# Patient Record
Sex: FEMALE | Race: WHITE | NOT HISPANIC OR LATINO | URBAN - METROPOLITAN AREA
[De-identification: names, ages, dates, MRNs, and addresses within clinical notes are randomized per-mention and may not be internally consistent; named-entity substitution may affect disease eponyms.]

---

## 2021-11-12 ENCOUNTER — INPATIENT (INPATIENT)
Facility: HOSPITAL | Age: 51
LOS: 3 days | Discharge: HOME | DRG: 41 | End: 2021-11-16
Attending: PSYCHIATRY & NEUROLOGY | Admitting: PSYCHIATRY & NEUROLOGY
Payer: COMMERCIAL

## 2021-11-12 VITALS
OXYGEN SATURATION: 95 % | WEIGHT: 175.05 LBS | SYSTOLIC BLOOD PRESSURE: 156 MMHG | DIASTOLIC BLOOD PRESSURE: 99 MMHG | TEMPERATURE: 97 F | HEART RATE: 75 BPM | RESPIRATION RATE: 16 BRPM

## 2021-11-12 LAB
ALBUMIN SERPL ELPH-MCNC: 3.7 G/DL — SIGNIFICANT CHANGE UP (ref 3.4–5)
ALP SERPL-CCNC: 44 U/L — SIGNIFICANT CHANGE UP (ref 40–120)
ALT FLD-CCNC: 13 U/L — SIGNIFICANT CHANGE UP (ref 12–42)
AMPHET UR-MCNC: NEGATIVE — SIGNIFICANT CHANGE UP
ANION GAP SERPL CALC-SCNC: 10 MMOL/L — SIGNIFICANT CHANGE UP (ref 9–16)
APTT BLD: 28.5 SEC — SIGNIFICANT CHANGE UP (ref 27.5–35.5)
AST SERPL-CCNC: 14 U/L — LOW (ref 15–37)
BARBITURATES UR SCN-MCNC: NEGATIVE — SIGNIFICANT CHANGE UP
BASOPHILS # BLD AUTO: 0.02 K/UL — SIGNIFICANT CHANGE UP (ref 0–0.2)
BASOPHILS NFR BLD AUTO: 0.2 % — SIGNIFICANT CHANGE UP (ref 0–2)
BENZODIAZ UR-MCNC: NEGATIVE — SIGNIFICANT CHANGE UP
BILIRUB SERPL-MCNC: 0.3 MG/DL — SIGNIFICANT CHANGE UP (ref 0.2–1.2)
BUN SERPL-MCNC: 16 MG/DL — SIGNIFICANT CHANGE UP (ref 7–23)
CALCIUM SERPL-MCNC: 8.5 MG/DL — SIGNIFICANT CHANGE UP (ref 8.5–10.5)
CHLORIDE SERPL-SCNC: 107 MMOL/L — SIGNIFICANT CHANGE UP (ref 96–108)
CO2 SERPL-SCNC: 23 MMOL/L — SIGNIFICANT CHANGE UP (ref 22–31)
COCAINE METAB.OTHER UR-MCNC: NEGATIVE — SIGNIFICANT CHANGE UP
CREAT SERPL-MCNC: 0.96 MG/DL — SIGNIFICANT CHANGE UP (ref 0.5–1.3)
EOSINOPHIL # BLD AUTO: 0 K/UL — SIGNIFICANT CHANGE UP (ref 0–0.5)
EOSINOPHIL NFR BLD AUTO: 0 % — SIGNIFICANT CHANGE UP (ref 0–6)
ETHANOL SERPL-MCNC: <3 MG/DL — SIGNIFICANT CHANGE UP
GLUCOSE SERPL-MCNC: 128 MG/DL — HIGH (ref 70–99)
HCG SERPL-ACNC: 1 MIU/ML — SIGNIFICANT CHANGE UP
HCT VFR BLD CALC: 40.5 % — SIGNIFICANT CHANGE UP (ref 34.5–45)
HGB BLD-MCNC: 13.2 G/DL — SIGNIFICANT CHANGE UP (ref 11.5–15.5)
IMM GRANULOCYTES NFR BLD AUTO: 0.3 % — SIGNIFICANT CHANGE UP (ref 0–1.5)
INR BLD: 1.03 — SIGNIFICANT CHANGE UP (ref 0.88–1.16)
LYMPHOCYTES # BLD AUTO: 1.43 K/UL — SIGNIFICANT CHANGE UP (ref 1–3.3)
LYMPHOCYTES # BLD AUTO: 13.1 % — SIGNIFICANT CHANGE UP (ref 13–44)
MCHC RBC-ENTMCNC: 29.1 PG — SIGNIFICANT CHANGE UP (ref 27–34)
MCHC RBC-ENTMCNC: 32.6 GM/DL — SIGNIFICANT CHANGE UP (ref 32–36)
MCV RBC AUTO: 89.2 FL — SIGNIFICANT CHANGE UP (ref 80–100)
METHADONE UR-MCNC: NEGATIVE — SIGNIFICANT CHANGE UP
MONOCYTES # BLD AUTO: 0.59 K/UL — SIGNIFICANT CHANGE UP (ref 0–0.9)
MONOCYTES NFR BLD AUTO: 5.4 % — SIGNIFICANT CHANGE UP (ref 2–14)
NEUTROPHILS # BLD AUTO: 8.87 K/UL — HIGH (ref 1.8–7.4)
NEUTROPHILS NFR BLD AUTO: 81 % — HIGH (ref 43–77)
NRBC # BLD: 0 /100 WBCS — SIGNIFICANT CHANGE UP (ref 0–0)
OPIATES UR-MCNC: NEGATIVE — SIGNIFICANT CHANGE UP
PCP SPEC-MCNC: SIGNIFICANT CHANGE UP
PCP UR-MCNC: NEGATIVE — SIGNIFICANT CHANGE UP
PLATELET # BLD AUTO: 296 K/UL — SIGNIFICANT CHANGE UP (ref 150–400)
POTASSIUM SERPL-MCNC: 3.4 MMOL/L — LOW (ref 3.5–5.3)
POTASSIUM SERPL-SCNC: 3.4 MMOL/L — LOW (ref 3.5–5.3)
PROT SERPL-MCNC: 7 G/DL — SIGNIFICANT CHANGE UP (ref 6.4–8.2)
PROTHROM AB SERPL-ACNC: 12.3 SEC — SIGNIFICANT CHANGE UP (ref 10.6–13.6)
RBC # BLD: 4.54 M/UL — SIGNIFICANT CHANGE UP (ref 3.8–5.2)
RBC # FLD: 12.8 % — SIGNIFICANT CHANGE UP (ref 10.3–14.5)
SARS-COV-2 RNA SPEC QL NAA+PROBE: SIGNIFICANT CHANGE UP
SODIUM SERPL-SCNC: 140 MMOL/L — SIGNIFICANT CHANGE UP (ref 132–145)
THC UR QL: POSITIVE
TROPONIN I, HIGH SENSITIVITY RESULT: 4.4 NG/L — SIGNIFICANT CHANGE UP
TROPONIN I, HIGH SENSITIVITY RESULT: 5.1 NG/L — SIGNIFICANT CHANGE UP
WBC # BLD: 10.94 K/UL — HIGH (ref 3.8–10.5)
WBC # FLD AUTO: 10.94 K/UL — HIGH (ref 3.8–10.5)

## 2021-11-12 PROCEDURE — 70496 CT ANGIOGRAPHY HEAD: CPT | Mod: 26

## 2021-11-12 PROCEDURE — 93010 ELECTROCARDIOGRAM REPORT: CPT

## 2021-11-12 PROCEDURE — 70498 CT ANGIOGRAPHY NECK: CPT | Mod: 26

## 2021-11-12 PROCEDURE — 71045 X-RAY EXAM CHEST 1 VIEW: CPT | Mod: 26

## 2021-11-12 PROCEDURE — 0042T: CPT | Mod: 26

## 2021-11-12 PROCEDURE — 99285 EMERGENCY DEPT VISIT HI MDM: CPT | Mod: 25

## 2021-11-12 RX ORDER — CLOPIDOGREL BISULFATE 75 MG/1
75 TABLET, FILM COATED ORAL ONCE
Refills: 0 | Status: DISCONTINUED | OUTPATIENT
Start: 2021-11-12 | End: 2021-11-13

## 2021-11-12 RX ORDER — MECLIZINE HCL 12.5 MG
25 TABLET ORAL ONCE
Refills: 0 | Status: COMPLETED | OUTPATIENT
Start: 2021-11-12 | End: 2021-11-12

## 2021-11-12 RX ORDER — ACETAMINOPHEN 500 MG
650 TABLET ORAL EVERY 6 HOURS
Refills: 0 | Status: DISCONTINUED | OUTPATIENT
Start: 2021-11-12 | End: 2021-11-16

## 2021-11-12 RX ORDER — ENOXAPARIN SODIUM 100 MG/ML
40 INJECTION SUBCUTANEOUS DAILY
Refills: 0 | Status: DISCONTINUED | OUTPATIENT
Start: 2021-11-12 | End: 2021-11-16

## 2021-11-12 RX ORDER — ASPIRIN/CALCIUM CARB/MAGNESIUM 324 MG
325 TABLET ORAL ONCE
Refills: 0 | Status: COMPLETED | OUTPATIENT
Start: 2021-11-12 | End: 2021-11-12

## 2021-11-12 RX ORDER — ASPIRIN/CALCIUM CARB/MAGNESIUM 324 MG
81 TABLET ORAL DAILY
Refills: 0 | Status: DISCONTINUED | OUTPATIENT
Start: 2021-11-13 | End: 2021-11-16

## 2021-11-12 RX ORDER — ONDANSETRON 8 MG/1
4 TABLET, FILM COATED ORAL EVERY 6 HOURS
Refills: 0 | Status: DISCONTINUED | OUTPATIENT
Start: 2021-11-12 | End: 2021-11-16

## 2021-11-12 RX ORDER — LANOLIN ALCOHOL/MO/W.PET/CERES
5 CREAM (GRAM) TOPICAL AT BEDTIME
Refills: 0 | Status: DISCONTINUED | OUTPATIENT
Start: 2021-11-12 | End: 2021-11-16

## 2021-11-12 RX ORDER — ONDANSETRON 8 MG/1
4 TABLET, FILM COATED ORAL ONCE
Refills: 0 | Status: COMPLETED | OUTPATIENT
Start: 2021-11-12 | End: 2021-11-12

## 2021-11-12 RX ORDER — ATORVASTATIN CALCIUM 80 MG/1
80 TABLET, FILM COATED ORAL AT BEDTIME
Refills: 0 | Status: DISCONTINUED | OUTPATIENT
Start: 2021-11-12 | End: 2021-11-12

## 2021-11-12 RX ADMIN — Medication 25 MILLIGRAM(S): at 18:30

## 2021-11-12 RX ADMIN — Medication 650 MILLIGRAM(S): at 22:42

## 2021-11-12 RX ADMIN — ONDANSETRON 4 MILLIGRAM(S): 8 TABLET, FILM COATED ORAL at 17:56

## 2021-11-12 RX ADMIN — Medication 5 MILLIGRAM(S): at 22:43

## 2021-11-12 RX ADMIN — ONDANSETRON 4 MILLIGRAM(S): 8 TABLET, FILM COATED ORAL at 22:43

## 2021-11-12 RX ADMIN — Medication 650 MILLIGRAM(S): at 23:35

## 2021-11-12 RX ADMIN — Medication 325 MILLIGRAM(S): at 18:45

## 2021-11-12 NOTE — ED PROVIDER NOTE - PHYSICAL EXAMINATION
Constitutional:  Well appearing, awake, alert, oriented to person, place, time/situation and in no apparent distress  Head:  NC/AT, symmetric, neck supple  ENMT: Airway patent, nasal mucosa clear, mouth with normal mucosa, throat has no vesicles, no oropharyngeal exudates and vulva is midline  Eyes:  Clear bilaterally, pupils equal, round and reactive to light  Cardiac:  Normal rate, regular rhythm. Heart sounds S1,S2.  No murmurs, rubs or gallops.  No JVD.  Respiratory:  Breath sounds clear and equal bilaterally  GI:   Abd soft, non-tender, no guarding  :  No discharge or lesions  MSK:  Spine appears normal, range of motion is not limited, no muscle or joint tenderness  Neuro:  see below  Skin:  Skin normal color for race, warm, dry and intact.  No evidence of rash  Psych:  Normal mood and affect, no apparent risk to self or others.  Heme:  No adenopathy or splenomegaly.

## 2021-11-12 NOTE — ED ADULT NURSE NOTE - OBJECTIVE STATEMENT
50 y/o female states while sitting at her computer working at 10:45am- she heard a pop and acutely had dizziness, slurred speech, ataxia, nausea and vomiting. Upon arrival Stroke code was initiated and sent to CT from Ambulance stretcher. IV placed labs drawn and sent. provider made aware

## 2021-11-12 NOTE — ED ADULT TRIAGE NOTE - CHIEF COMPLAINT QUOTE
Patient reports dizziness , difficulty speaking and unsteady gait , nausea and vomiting since 10:30am , pt states that she got up from her home computer when she felt a pop in her brain and the symptoms started right after. STROKE CODE initiated

## 2021-11-12 NOTE — ED PROVIDER NOTE - CLINICAL SUMMARY MEDICAL DECISION MAKING FREE TEXT BOX
52 y/o F presents to ED c/o dizziness x 6.5 hours.  Pt identified as stroke protocol upon arrival to ED.  Pt slightly HTN upon arrival 150's/90's systolic.  Labs wnl.  CT head, CTA head and neck and CT perfusion, and CXR unremarkable.  Pt discussed with DR. Elin Alarcon, neurology and [facility].  Pt amenable to admission.

## 2021-11-12 NOTE — H&P ADULT - NSHPREVIEWOFSYSTEMS_GEN_ALL_CORE
Constitutional: No fever, weight loss or fatigue  Eyes: No eye pain, visual disturbances, or discharge  ENMT:  No difficulty hearing, tinnitus, vertigo; No sinus or throat pain  Neck: No pain or stiffness  Respiratory: No cough, wheezing, chills or hemoptysis  Cardiovascular: No chest pain, palpitations, shortness of breath, dizziness or leg swelling  Gastrointestinal: No abdominal pain. No nausea, vomiting or hematemesis; No diarrhea or constipation. Nohematochezia.  Genitourinary: No dysuria, frequency, hematuria or incontinence  Neurological: As per HPI

## 2021-11-12 NOTE — H&P ADULT - NSHPLABSRESULTS_GEN_ALL_CORE
Fingerstick Blood Glucose: CAPILLARY BLOOD GLUCOSE  108 (12 Nov 2021 18:36)      POCT Blood Glucose.: 108 mg/dL (12 Nov 2021 17:12)    LABS:                        13.2   10.94 )-----------( 296      ( 12 Nov 2021 17:28 )             40.5     11-12    140  |  107  |  16  ----------------------------<  128<H>  3.4<L>   |  23  |  0.96    Ca    8.5      12 Nov 2021 17:28    TPro  7.0  /  Alb  3.7  /  TBili  0.3  /  DBili  x   /  AST  14<L>  /  ALT  13  /  AlkPhos  44  11-12    PT/INR - ( 12 Nov 2021 17:28 )   PT: 12.3 sec;   INR: 1.03          PTT - ( 12 Nov 2021 17:28 )  PTT:28.5 sec      RADIOLOGY & ADDITIONAL STUDIES:  < from: CT Brain Stroke Protocol (11.12.21 @ 17:49) >  IMPRESSION:  No acute intracranial findings.      < from: CT Perfusion w/ IV Cont (11.12.21 @ 17:50) >  CT PERFUSION: Suggestion of prolonged Tmax in the right temporal lobe and adjacent right cerebellar hemisphere, favored to be artifactual in nature. (Tmax >6.0s volume: 16 mL)      < from: CT Angio Neck w/ IV Cont (11.12.21 @ 17:50) >  CT ANGIOGRAPHY NECK: No vessel narrowing or occlusion in the neck.      < from: CT Angio Head w/ IV Cont (11.12.21 @ 17:50) >  CT ANGIOGRAPHY HEAD: No evidence of vascular stenosis, occlusion, aneurysm or vascular malformation.    < from: Xray Chest 1 View AP/PA (11.12.21 @ 17:39) >  Impression: No acute abnormality visualized.

## 2021-11-12 NOTE — ED ADULT NURSE REASSESSMENT NOTE - NSIMPLEMENTINTERV_GEN_ALL_ED
Implemented All Fall Risk Interventions:  [place] to call system. Call bell, personal items and telephone within reach. Instruct patient to call for assistance. Room bathroom lighting operational. Non-slip footwear when patient is off stretcher. Physically safe environment: no spills, clutter or unnecessary equipment. Stretcher in lowest position, wheels locked, appropriate side rails in place. Provide visual cue, wrist band, yellow gown, etc. Monitor gait and stability. Monitor for mental status changes and reorient to person, place, and time. Review medications for side effects contributing to fall risk. Reinforce activity limits and safety measures with patient and family.

## 2021-11-12 NOTE — ED ADULT NURSE REASSESSMENT NOTE - NS ED NURSE REASSESS COMMENT FT1
received pt from JOE Rader, pt on telemetry, VSS. denies HA and overall feels that her sx have improved somewhat. c/o dizziness and mild nausea. +fall risk precautions in place. will continue to monitor

## 2021-11-12 NOTE — H&P ADULT - ASSESSMENT
51y Female with PMHx of vertigo initially presented to [facility] for dizziness, right hand dysmetria and right leg weakness, LKW 11/12 10:30AM. At [facility], CTH without hemorrhage. CTA with no steno-occlusive disease. CTP with prolonged Tmax in the right temporal lobe and right cerebellar lobe thought to be artifact as it does not correspond to her symptoms. Patient out of tpa window. She was transferred to [facility] for stroke work up.     Neuro  #CVA r/o  - s/p ASA 325mg load at [facility]  - start aspirin 81mg tomorrow morning, will hold plavix   - hold atorvastatin 80mg qhs pending LDL results  - q4hr stroke neuro checks and vitals  - obtain MRI Brain without contrast  - HCT Results: negative  - CTA Results: no steno-occlusive disease  - Stroke education    Cards  - permissive hypertension, Goal -180  - obtain TTE with bubble  - Stroke Code EKG Results: NSR  - LDL results: pending am labs    Pulm  - call provider if SPO2 < 94%    GI  #Nutrition/Fluids/Electrolytes   - replete K<4 and Mg <2  - Diet: Regular  - IVF: none    Infectious Disease  - Stroke Code CXR results: No acute abnormality visualized.    Endocrine  - A1C results: pending am labs  - TSH results: pending am labs    DVT Prophylaxis  - lovenox sq for DVT prophylaxis   - SCDs for DVT prophylaxis       Discussed with Neurology Attending Dr. Alarcon 51y Female with PMHx of vertigo initially presented to [facility] for dizziness, right hand dysmetria and right leg weakness, LKW 11/12 10:30AM. At [facility], CTH without hemorrhage. CTA with no steno-occlusive disease. CTP with prolonged Tmax in the right temporal lobe and right cerebellar lobe thought to be artifact per report. Patient out of tpa window. She was transferred to [facility] for stroke work up.     Neuro  #CVA r/o  In discussion with Dr. Zamudio, concern for cerebellar ischemia despite CTA report as right vertebral artery appears smaller and terminates at the right PICA. As ischemia would be below the decussation of pyramids, would expect ipsilateral symptoms  - s/p ASA 325mg load at [facility]  - c/w with asa 81mg daily tomorrow, will give one dose of plavix 75mg now and will continue pending MRA results  - hold atorvastatin 80mg qhs pending LDL results  - q4hr stroke neuro checks and vitals  - obtain MRI Brain without contrast  - obtain MRA neck r/o dissection   - consider hypercoagable w/u as pt with no pmhx risk factors  - f/u ESR, CRP  - HCT Results: negative  - CTA Results: no steno-occlusive disease  - Stroke education    Cards  - permissive hypertension, Goal -180 for cerebellum perfusion   - obtain TTE with bubble  - Stroke Code EKG Results: NSR  - LDL results: pending am labs    Pulm  - call provider if SPO2 < 94%    GI  #Nutrition/Fluids/Electrolytes   - replete K<4 and Mg <2  - Diet: Regular  - IVF: none    Infectious Disease  - Stroke Code CXR results: No acute abnormality visualized.    Endocrine  - A1C results: pending am labs  - TSH results: pending am labs    DVT Prophylaxis  - lovenox sq for DVT prophylaxis   - SCDs for DVT prophylaxis       Discussed with Neurology Attending Dr. Zamudio 51y Female with PMHx of vertigo initially presented to [facility] for dizziness, right hand dysmetria and right leg weakness, LKW 11/12 10:30AM. At [facility], CTH without hemorrhage. CTA with no steno-occlusive disease. CTP with prolonged Tmax in the right temporal lobe and right cerebellar lobe thought to be artifact per report. Patient out of tpa window. She was transferred to [facility] for stroke work up.     Neuro  #CVA r/o  In discussion with Dr. Zamudio, concern for cerebellar ischemia despite CTA report as right vertebral artery appears smaller and terminates at the right PICA. As ischemia would be below the decussation of pyramids, would expect ipsilateral symptoms (spinocerebellar tracts do not decussate)  - s/p ASA 325mg load at [facility]  - c/w with asa 81mg daily tomorrow, will give one dose of plavix 75mg now and will continue pending MRA results  - hold atorvastatin 80mg qhs pending LDL results  - q4hr stroke neuro checks and vitals  - obtain MRI Brain without contrast  - obtain MRA neck r/o dissection   - consider hypercoagable w/u as pt with no pmhx risk factors  - f/u ESR, CRP  - HCT Results: negative  - CTA Results: no steno-occlusive disease  - Stroke education    Cards  - permissive hypertension, Goal -180 for cerebellum perfusion   - obtain TTE with bubble  - Stroke Code EKG Results: NSR  - LDL results: pending am labs    Pulm  - call provider if SPO2 < 94%    GI  #Nutrition/Fluids/Electrolytes   - replete K<4 and Mg <2  - Diet: Regular  - IVF: none    Infectious Disease  - Stroke Code CXR results: No acute abnormality visualized.    Endocrine  - A1C results: pending am labs  - TSH results: pending am labs    DVT Prophylaxis  - lovenox sq for DVT prophylaxis   - SCDs for DVT prophylaxis       Discussed with Neurology Attending Dr. Zamudio

## 2021-11-12 NOTE — H&P ADULT - HISTORY OF PRESENT ILLNESS
**STROKE HPI***    HPI: 51y Female with PMHx of vertigo initially presented to [facility] for dizziness, right hand dysmetria and right leg weakness. Patient woke up 11/12 10:30AM in [facility]. 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 She tried to continue with her call but noticed that she had some word finding difficulties, slurred speech and difficulty navigating the computer with her right hand (right hand dominant). Her dizziness is much more severe compared to her prior vertigo episodes. Her dizziness does improve with her eyes closed and turning onto her right side. At [facility], CTH without hemorrhage. CTA with no steno-occlusive disease. CTP with prolonged Tmax in the right temporal lob and right cerebellar lobe thought to be artifact as it does not correspond to her symptoms. Patient was loaded with ASA 325mg and transferred to [facility] for stroke work up.     At bedside, patient's symptoms have not improved. Her dizziness is described as the room spinning. Her right hand dysmetria is still present and when attempting to walk at [facility], she had to hold onto the wall to support herself as she felt that her right leg was dragging. She denies CP, SOB, changes in vision, decrease sensation/numbness along her extremities. She last saw her PCP w1 year ago. Her mother has 2 miscarriages and provoked b/l LE DVTs (secondary to b/l leg fractures). Patient has never been pregnant and denies any family hx of blood disorders.         **STROKE HPI***    HPI: 51y Female with PMHx of vertigo initially presented to [facility] for dizziness, right hand dysmetria and right leg weakness. Patient woke up 11/12 10:30AM in [facility]. 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 She tried to continue with her call but noticed that she had some word finding difficulties, slurred speech and difficulty navigating the computer with her right hand (right hand dominant). Her dizziness is much more severe compared to her prior vertigo episodes. Her dizziness does improve with her eyes closed and turning onto her right side. At [facility], CTH without hemorrhage. CTA with no steno-occlusive disease. CTP with prolonged Tmax in the right temporal lob and right cerebellar lobe thought to be artifact. Patient was loaded with ASA 325mg and transferred to [facility] for stroke work up.     At bedside, patient's symptoms have not improved. Her dizziness is described as the room spinning. Her right hand dysmetria is still present and when attempting to walk at [facility], she had to hold onto the wall to support herself as she felt that her right leg was dragging. She denies CP, SOB, changes in vision, decrease sensation/numbness along her extremities. She last saw her PCP 1 year ago. Her mother has 2 miscarriages and provoked b/l LE DVTs (secondary to b/l leg fractures). Patient has never been pregnant and denies any family hx of blood disorders.

## 2021-11-12 NOTE — H&P ADULT - NSHPPHYSICALEXAM_GEN_ALL_CORE
Vital Signs Last 24 Hrs  T(C): 36.7 (12 Nov 2021 21:01), Max: 36.7 (12 Nov 2021 21:01)  T(F): 98 (12 Nov 2021 21:01), Max: 98 (12 Nov 2021 21:01)  HR: 52 (12 Nov 2021 21:01) (52 - 75)  BP: 162/94 (12 Nov 2021 21:01) (156/99 - 172/84)  BP(mean): --  RR: 16 (12 Nov 2021 21:01) (14 - 16)  SpO2: 98% (12 Nov 2021 21:01) (95% - 100%)    Physical exam:  General: No acute distress, awake and alert  Cardiovascular: Regular rate and rhythm, no murmurs, rubs, or gallops. No bruits  Pulmonary: Anterior breath sounds clear bilaterally, no crackles or wheezing. No use of accessory muscles  GI: Abdomen soft, non-tender, non-distended    Neurologic:  -Mental status: Awake, alert, oriented to person, place, and time. Speech is fluent, able to name pen, thumb, glasses, mild dysarthria, easily comprehensible. Recent and remote memory intact. Follows commands. Attention/concentration intact. Fund of knowledge appropriate.  -Cranial nerves:   II: Visual fields are full to confrontation.  III, IV, VI: Extraocular movements are intact without nystagmus. Pupils equally round and reactive to light  V:  Facial sensation V1-V3 equal and intact   VII: Face is symmetric with normal eye closure and smile  VIII: Hearing is grossly intact bilaterally  XII: Tongue protrudes midline  Motor: Normal bulk and tone. No pronator drift. Strength bilateral upper extremity 5/5, bilateral lower extremities 5/5. Rapid alternating movements intact and symmetric  Sensation: Intact to light touch bilaterally. No neglect or extinction on double simultaneous testing.  Coordination: Dysmetria on finger to nose on right UE. No dysmetria heel-to-shin bilaterally  Gait: Deferred     NIHSS: 2 ASPECT Score: 10

## 2021-11-12 NOTE — ED PROVIDER NOTE - OBJECTIVE STATEMENT
52 y/o F with PMH dizziness c/o 52 y/o F with PMH vertigo presents to ED via EMS c/o dizziness that started at 10:30 am this morning, approx 6.5 hrs pta.  Pt states woke up at 10 am and symptoms started 30 minutes later.  She describes feeling pop like sensation to her neck followed by significant dizziness.  She became nauseous with vomiting and was not able to walk.  She endorses a mild headache.  Pt denies trauma/falls, fevers/chills, neck or back pain, visual changes, sore throat, chest pain, palpitations, cough, SOB, abd pain, n/v/d, dysuria, hematuria, weakness, numbness, lower extremity swelling, rash, sick contacts, recent hospitalizations, recent travels.

## 2021-11-13 LAB
A1C WITH ESTIMATED AVERAGE GLUCOSE RESULT: 5.3 % — SIGNIFICANT CHANGE UP (ref 4–5.6)
ANION GAP SERPL CALC-SCNC: 7 MMOL/L — SIGNIFICANT CHANGE UP (ref 5–17)
BUN SERPL-MCNC: 9 MG/DL — SIGNIFICANT CHANGE UP (ref 7–23)
CALCIUM SERPL-MCNC: 9.4 MG/DL — SIGNIFICANT CHANGE UP (ref 8.4–10.5)
CHLORIDE SERPL-SCNC: 105 MMOL/L — SIGNIFICANT CHANGE UP (ref 96–108)
CHOLEST SERPL-MCNC: 217 MG/DL — HIGH
CO2 SERPL-SCNC: 28 MMOL/L — SIGNIFICANT CHANGE UP (ref 22–31)
COVID-19 NUCLEOCAPSID GAM AB INTERP: NEGATIVE — SIGNIFICANT CHANGE UP
COVID-19 NUCLEOCAPSID TOTAL GAM ANTIBODY RESULT: 0.08 INDEX — SIGNIFICANT CHANGE UP
COVID-19 SPIKE DOMAIN AB INTERP: POSITIVE
COVID-19 SPIKE DOMAIN ANTIBODY RESULT: >250 U/ML — HIGH
CREAT SERPL-MCNC: 1 MG/DL — SIGNIFICANT CHANGE UP (ref 0.5–1.3)
CRP SERPL-MCNC: <3 MG/L — SIGNIFICANT CHANGE UP (ref 0–4)
ERYTHROCYTE [SEDIMENTATION RATE] IN BLOOD: 11 MM/HR — SIGNIFICANT CHANGE UP
ESTIMATED AVERAGE GLUCOSE: 105 MG/DL — SIGNIFICANT CHANGE UP (ref 68–114)
GLUCOSE SERPL-MCNC: 110 MG/DL — HIGH (ref 70–99)
HCT VFR BLD CALC: 43.6 % — SIGNIFICANT CHANGE UP (ref 34.5–45)
HDLC SERPL-MCNC: 55 MG/DL — SIGNIFICANT CHANGE UP
HGB BLD-MCNC: 13.6 G/DL — SIGNIFICANT CHANGE UP (ref 11.5–15.5)
LIPID PNL WITH DIRECT LDL SERPL: 138 MG/DL — HIGH
MAGNESIUM SERPL-MCNC: 2.2 MG/DL — SIGNIFICANT CHANGE UP (ref 1.6–2.6)
MCHC RBC-ENTMCNC: 28.3 PG — SIGNIFICANT CHANGE UP (ref 27–34)
MCHC RBC-ENTMCNC: 31.2 GM/DL — LOW (ref 32–36)
MCV RBC AUTO: 90.6 FL — SIGNIFICANT CHANGE UP (ref 80–100)
NON HDL CHOLESTEROL: 162 MG/DL — HIGH
NRBC # BLD: 0 /100 WBCS — SIGNIFICANT CHANGE UP (ref 0–0)
PHOSPHATE SERPL-MCNC: 4 MG/DL — SIGNIFICANT CHANGE UP (ref 2.5–4.5)
PLATELET # BLD AUTO: 338 K/UL — SIGNIFICANT CHANGE UP (ref 150–400)
POTASSIUM SERPL-MCNC: 4.3 MMOL/L — SIGNIFICANT CHANGE UP (ref 3.5–5.3)
POTASSIUM SERPL-SCNC: 4.3 MMOL/L — SIGNIFICANT CHANGE UP (ref 3.5–5.3)
RBC # BLD: 4.81 M/UL — SIGNIFICANT CHANGE UP (ref 3.8–5.2)
RBC # FLD: 13 % — SIGNIFICANT CHANGE UP (ref 10.3–14.5)
SARS-COV-2 IGG+IGM SERPL QL IA: 0.08 INDEX — SIGNIFICANT CHANGE UP
SARS-COV-2 IGG+IGM SERPL QL IA: >250 U/ML — HIGH
SARS-COV-2 IGG+IGM SERPL QL IA: NEGATIVE — SIGNIFICANT CHANGE UP
SARS-COV-2 IGG+IGM SERPL QL IA: POSITIVE
SODIUM SERPL-SCNC: 140 MMOL/L — SIGNIFICANT CHANGE UP (ref 135–145)
TRIGL SERPL-MCNC: 121 MG/DL — SIGNIFICANT CHANGE UP
TSH SERPL-MCNC: 1.91 UIU/ML — SIGNIFICANT CHANGE UP (ref 0.27–4.2)
WBC # BLD: 9.6 K/UL — SIGNIFICANT CHANGE UP (ref 3.8–10.5)
WBC # FLD AUTO: 9.6 K/UL — SIGNIFICANT CHANGE UP (ref 3.8–10.5)

## 2021-11-13 PROCEDURE — 99233 SBSQ HOSP IP/OBS HIGH 50: CPT

## 2021-11-13 PROCEDURE — 70551 MRI BRAIN STEM W/O DYE: CPT | Mod: 26

## 2021-11-13 PROCEDURE — 70547 MR ANGIOGRAPHY NECK W/O DYE: CPT | Mod: 26

## 2021-11-13 PROCEDURE — 99223 1ST HOSP IP/OBS HIGH 75: CPT

## 2021-11-13 RX ORDER — ATORVASTATIN CALCIUM 80 MG/1
40 TABLET, FILM COATED ORAL AT BEDTIME
Refills: 0 | Status: DISCONTINUED | OUTPATIENT
Start: 2021-11-13 | End: 2021-11-16

## 2021-11-13 RX ORDER — SODIUM CHLORIDE 5 G/100ML
250 INJECTION, SOLUTION INTRAVENOUS
Refills: 0 | Status: COMPLETED | OUTPATIENT
Start: 2021-11-13 | End: 2021-11-13

## 2021-11-13 RX ORDER — CLOPIDOGREL BISULFATE 75 MG/1
75 TABLET, FILM COATED ORAL DAILY
Refills: 0 | Status: DISCONTINUED | OUTPATIENT
Start: 2021-11-13 | End: 2021-11-16

## 2021-11-13 RX ADMIN — ATORVASTATIN CALCIUM 40 MILLIGRAM(S): 80 TABLET, FILM COATED ORAL at 23:01

## 2021-11-13 RX ADMIN — ENOXAPARIN SODIUM 40 MILLIGRAM(S): 100 INJECTION SUBCUTANEOUS at 12:11

## 2021-11-13 RX ADMIN — Medication 650 MILLIGRAM(S): at 18:59

## 2021-11-13 RX ADMIN — SODIUM CHLORIDE 500 MILLILITER(S): 5 INJECTION, SOLUTION INTRAVENOUS at 21:46

## 2021-11-13 RX ADMIN — Medication 650 MILLIGRAM(S): at 19:44

## 2021-11-13 RX ADMIN — Medication 81 MILLIGRAM(S): at 12:11

## 2021-11-13 RX ADMIN — CLOPIDOGREL BISULFATE 75 MILLIGRAM(S): 75 TABLET, FILM COATED ORAL at 23:01

## 2021-11-13 RX ADMIN — Medication 5 MILLIGRAM(S): at 23:01

## 2021-11-13 NOTE — OCCUPATIONAL THERAPY INITIAL EVALUATION ADULT - PERTINENT HX OF CURRENT PROBLEM, REHAB EVAL
51y Female with PMHx of vertigo initially presented to [facility] for dizziness, right hand dysmetria and right leg weakness, LKW 11/12 10:30AM. At [facility], CTH without hemorrhage. CTA with no steno-occlusive disease. CTP with prolonged Tmax in the right temporal lobe and right cerebellar lobe thought to be artifact per report. Patient out of tpa window. She was transferred to [facility] for stroke work up.

## 2021-11-13 NOTE — PROGRESS NOTE ADULT - ASSESSMENT
51y Female with PMHx of vertigo initially presented to [facility] for dizziness, right hand dysmetria and right leg weakness, LKW 11/12 10:30AM. At [facility], CTH without hemorrhage. CTA with no steno-occlusive disease. CTP with prolonged Tmax in the right temporal lobe and right cerebellar lobe thought to be artifact per report. Patient out of tpa window. She was transferred to [facility] for stroke work up.     Neuro  #CVA r/o  In discussion with Dr. Zamudio, concern for cerebellar ischemia despite CTA report as right vertebral artery appears smaller and terminates at the right PICA. As ischemia would be below the decussation of pyramids, would expect ipsilateral symptoms (spinocerebellar tracts do not decussate)  - s/p ASA 325mg load at [facility]  - c/w with asa 81mg daily and plavix 75 daily  - atorvastatin 40mg   - q4hr stroke neuro checks and vitals  - obtain MRI Brain without contrast  - obtain MRA neck r/o dissection   - consider hypercoagable w/u as pt with no pmhx risk factors if there is no dissection  -  ESR 11  - HCT Results: negative  - CTA Results: no steno-occlusive disease, per admitting attending, right vertebral artery appears smaller and terminates in PICA  - Stroke education    Cards  - permissive hypertension, Goal -180 for cerebellum perfusion   - obtain TTE with bubble  - Stroke Code EKG Results: NSR  - LDL results: 138    Pulm  - call provider if SPO2 < 94%    GI  #Nutrition/Fluids/Electrolytes   - replete K<4 and Mg <2  - Diet: Regular  - IVF: none    Infectious Disease  - Stroke Code CXR results: No acute abnormality visualized.    Endocrine  - A1C results: 5.3  - TSH results: pending    DVT Prophylaxis  - lovenox sq for DVT prophylaxis   - SCDs for DVT prophylaxis       Discussed with Neurology Attending Dr. Tapia   51y Female with PMHx of vertigo initially presented to [facility] for dizziness, right hand dysmetria and right leg weakness, LKW 11/12 10:30AM. At [facility], CTH without hemorrhage. CTA with no steno-occlusive disease. CTP with prolonged Tmax in the right temporal lobe and right cerebellar lobe thought to be artifact per report. Patient out of tpa window. She was transferred to [facility] for stroke work up.     Neuro  #CVA r/o  In discussion with Dr. Zamudio, concern for cerebellar ischemia despite CTA report as right vertebral artery appears smaller and terminates at the right PICA. As ischemia would be below the decussation of pyramids, would expect ipsilateral symptoms (spinocerebellar tracts do not decussate)  - s/p ASA 325mg load at [facility]  - c/w with asa 81mg daily and plavix 75 daily  - atorvastatin 40mg   - q4hr stroke neuro checks and vitals  - obtain MRI Brain without contrast  - obtain MRA neck r/o dissection   - consider hypercoagable w/u as pt with no pmhx risk factors if there is no dissection  -  ESR 11  - HCT Results: negative  - CTA Results: no steno-occlusive disease, per admitting attending, right vertebral artery appears smaller and terminates in PICA  - Stroke education    Cards  - permissive hypertension, Goal -180 for cerebellum perfusion   - obtain TTE with bubble  - Stroke Code EKG Results: NSR  - LDL results: 138    Pulm  - call provider if SPO2 < 94%    GI  #Nutrition/Fluids/Electrolytes   - replete K<4 and Mg <2  - Diet: Regular  - IVF: none    Infectious Disease  - Stroke Code CXR results: No acute abnormality visualized.    Endocrine  - A1C results: 5.3  - TSH results: pending    DVT Prophylaxis  - lovenox sq for DVT prophylaxis   - SCDs for DVT prophylaxis     PT/OT/SLP - outpatient therapy    Discussed with Neurology Attending Dr. Tapia   51y Female with PMHx of vertigo initially presented to [facility] for dizziness, right hand dysmetria and right leg weakness, LKW 11/12 10:30AM. At [facility], CTH without hemorrhage. CTA with no steno-occlusive disease. CTP with prolonged Tmax in the right temporal lobe and right cerebellar lobe thought to be artifact per report. Patient out of tpa window. She was transferred to [facility] for stroke work up.     Neuro  #CVA r/o  In discussion with Dr. Zamudio, concern for cerebellar ischemia despite CTA report as right vertebral artery appears smaller and terminates at the right PICA. As ischemia would be below the decussation of pyramids, would expect ipsilateral symptoms (spinocerebellar tracts do not decussate)  - s/p ASA 325mg load at [facility]  - c/w with asa 81mg daily and plavix 75 daily  - atorvastatin 40mg   - q4hr stroke neuro checks and vitals  - obtain MRI Brain without contrast  - obtain MRA neck r/o dissection   - consider hypercoagable w/u as pt with no pmhx risk factors if there is no dissection  -  ESR 11  - HCT Results: negative  - CTA Results: no steno-occlusive disease, per admitting attending, right vertebral artery appears smaller and terminates in PICA  - Stroke education    Cards  - permissive hypertension, Goal -180 for cerebellum perfusion   - obtain TTE with bubble  - Stroke Code EKG Results: NSR  - LDL results: 138    Pulm  - call provider if SPO2 < 94%    GI  #Nutrition/Fluids/Electrolytes   - replete K<4 and Mg <2  - Diet: Regular  - IVF: none    Infectious Disease  - Stroke Code CXR results: No acute abnormality visualized.    Endocrine  - A1C results: 5.3  - TSH results: 1.9    DVT Prophylaxis  - lovenox sq for DVT prophylaxis   - SCDs for DVT prophylaxis     PT/OT/SLP - outpatient therapy    Discussed with Neurology Attending Dr. Tapia   51y Female with PMHx of vertigo initially presented to [facility] for dizziness, right hand dysmetria and right leg weakness, LKW 11/12 10:30AM. At [facility], CTH without hemorrhage. CTA with no steno-occlusive disease. CTP with prolonged Tmax in the right temporal lobe and right cerebellar lobe thought to be artifact per report. Patient out of tpa window. She was transferred to [facility] for stroke work up.     Neuro  #CVA r/o  In discussion with Dr. Zamudio, concern for cerebellar ischemia despite CTA report as right vertebral artery appears smaller and terminates at the right PICA. As ischemia would be below the decussation of pyramids, would expect ipsilateral symptoms (spinocerebellar tracts do not decussate)  - s/p ASA 325mg load at [facility]  - c/w with asa 81mg daily and plavix 75 daily  - atorvastatin 40mg   - q4hr stroke neuro checks and vitals  - obtain MRI Brain without contrast  - obtain MRA neck r/o dissection   - consider hypercoagable w/u as pt with no pmhx risk factors if there is no dissection  -  ESR 11  - HCT Results: negative  - CTA Results: no steno-occlusive disease, per admitting attending, right vertebral artery appears smaller and terminates in PICA  - Stroke education    Cards  - permissive hypertension, Goal -180   - obtain TTE with bubble  - Stroke Code EKG Results: NSR  - LDL results: 138    Pulm  - call provider if SPO2 < 94%    GI  #Nutrition/Fluids/Electrolytes   - replete K<4 and Mg <2  - Diet: Regular  - IVF: none    Infectious Disease  - Stroke Code CXR results: No acute abnormality visualized.    Endocrine  - A1C results: 5.3  - TSH results: 1.9    DVT Prophylaxis  - lovenox sq for DVT prophylaxis   - SCDs for DVT prophylaxis     PT/OT/SLP - outpatient therapy    Discussed with Neurology Attending Dr. Tapia

## 2021-11-13 NOTE — PHYSICAL THERAPY INITIAL EVALUATION ADULT - GENERAL OBSERVATIONS, REHAB EVAL
PT IE Completed. MRS: #4. Pt received semi-supine in bed, +heplock, +tele, A&Ox4, +RA, in NAD and agreeable to work with PT. OT Josué present throughout. Pt presents to [facility] with dizziness, RUE/RLE weakness.Pt left in as found, +bed alarm, +call jhaveri within reach, RN Kailyn notified. Pt can benefit from PT in order to improve quality of life by increasing strength/endurance/balance with functional mobility and ADLS. Will assess for assistive device in further session.

## 2021-11-13 NOTE — PHYSICAL THERAPY INITIAL EVALUATION ADULT - MANUAL MUSCLE TESTING RESULTS, REHAB EVAL
RUE shoulder flexion/elevation/elbow flexion/elbow extension/ 5/5  ; LUE shoulder flexion/elevation/elbow flexion/elbow extension/ 5/5

## 2021-11-13 NOTE — SPEECH LANGUAGE PATHOLOGY EVALUATION - SLP DIAGNOSIS
Pt presents with mild dysarthria characterized by irregular articulatory breakdowns, telescoping syllables, and impaired AMRs. 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 •	Expressive and receptive language skills were clinically judged to be WFL at the semi-complex conversation level. This was evidenced by patient’s ability to accurately respond to a variety of open-ended questions. Content was logical and pertinent. Sentences were semantically and syntactically appropriate.

## 2021-11-13 NOTE — SPEECH LANGUAGE PATHOLOGY EVALUATION - ARTICULATION
articulatory breakdowns more prominent during production of multisyllabic words, telescoping of syllables/imprecise

## 2021-11-13 NOTE — CONSULT NOTE ADULT - SUBJECTIVE AND OBJECTIVE BOX
Patient seen and evaluated at bedside    HPI: 51y F with PMHx of vertigo initially presented to [facility] for dizziness, right hand dysmetria and right leg weakness, LKW 11/12 10:30AM. At [facility], CTH without hemorrhage. CTA with no steno-occlusive disease. CTP with prolonged Tmax in the right temporal lobe and right cerebellar lobe thought to be artifact per report. Patient out of tpa window. She was transferred to [facility] for stroke work up.     PMHx:   Vertigo      PSHx:     FAMILY HISTORY:    Allergies:  sulfa drugs (Unknown)    Home Medications:    Current Medications:   acetaminophen     Tablet .. 650 milliGRAM(s) Oral every 6 hours PRN  aspirin  chewable 81 milliGRAM(s) Oral daily  atorvastatin 40 milliGRAM(s) Oral at bedtime  clopidogrel Tablet 75 milliGRAM(s) Oral once  enoxaparin Injectable 40 milliGRAM(s) SubCutaneous daily  melatonin 5 milliGRAM(s) Oral at bedtime  ondansetron Injectable 4 milliGRAM(s) IV Push every 6 hours PRN    Social History  Smoking History: denies  Alcohol Use: denies  Drug Use: denies    REVIEW OF SYSTEMS:  Constitutional:     [X] negative [ ] fevers [ ] chills [ ] weight loss [ ] weight gain  HEENT:                  [X] negative [ ] dry eyes [ ] eye irritation [ ] postnasal drip [ ] nasal congestion  CV:                         [X] negative  [ ] chest pain [ ] orthopnea [ ] palpitations [ ] murmur  Resp:                     [X] negative [ ] cough [ ] shortness of breath [ ] wheezing [ ] sputum [ ] hemoptysis  GI:                          [X] negative [ ] nausea [ ] vomiting [ ] diarrhea [ ] constipation [ ] abd pain [ ] dysphagia   :                        [X] negative [ ] dysuria [ ] nocturia [ ] hematuria [ ] increased urinary frequency  MSK:                      [X] negative [ ] back pain [ ] myalgias [ ] arthralgias [ ] fracture  Skin:                       [X] negative [ ] rash [ ] itch  Neuro:                   [ ] negative [ ] headache [X] dizziness [ ] syncope [ ] weakness [ ] numbness  Psych:                    [X] negative [ ] anxiety [ ] depression  Endo:                     [X] negative [ ] diabetes [ ] thyroid problem  Heme/Lymph:      [X] negative [ ] anemia [ ] bleeding problem  Allergic/Immune: [X] negative [ ] itchy eyes [ ] nasal discharge [ ] hives [ ] angioedema    [X] All other systems negative or otherwise described above.  [ ] Unable to assess ROS because ________.    ICU Vital Signs Last 24 Hrs  T(C): 36.8 (13 Nov 2021 17:30), Max: 37.1 (13 Nov 2021 13:49)  T(F): 98.2 (13 Nov 2021 17:30), Max: 98.7 (13 Nov 2021 13:49)  HR: 66 (13 Nov 2021 16:19) (52 - 78)  BP: 156/91 (13 Nov 2021 16:19) (127/83 - 172/84)  BP(mean): 117 (13 Nov 2021 16:19) (97 - 124)  ABP: --  ABP(mean): --  RR: 18 (13 Nov 2021 16:19) (14 - 18)  SpO2: 97% (13 Nov 2021 16:19) (97% - 100%)    Orthostatic VS    Daily     Daily   I&O's Summary      Physical Exam:  GENERAL: No acute distress, well-developed  HEAD:  Atraumatic, Normocephalic  ENT: EOMI, conjunctiva and sclera clear, Neck supple, No JVD, moist mucosa  CHEST/LUNG: Clear to auscultation bilaterally; No wheeze, equal breath sounds bilaterally   BACK: No spinal tenderness  HEART: Regular rate and rhythm; No murmurs, rubs, or gallops, radial and DP 2+ b/l, euvolemic  ABDOMEN: Soft, Nontender, Nondistended  EXTREMITIES:  No clubbing, cyanosis, or edema  PSYCH: Nl behavior, nl affect  NEUROLOGY: AAOx3, non-focal  SKIN: Normal color, No rashes or lesions  LINES: no central lines present    LABS:                        13.6   9.60  )-----------( 338      ( 13 Nov 2021 08:22 )             43.6     PT/INR - ( 12 Nov 2021 17:28 )   PT: 12.3 sec;   INR: 1.03          PTT - ( 12 Nov 2021 17:28 )  PTT:28.5 sec  11-13    140  |  105  |  9   ----------------------------<  110<H>  4.3   |  28  |  1.00    Ca    9.4      13 Nov 2021 08:20  Phos  4.0     11-13  Mg     2.2     11-13    TPro  7.0  /  Alb  3.7  /  TBili  0.3  /  DBili  x   /  AST  14<L>  /  ALT  13  /  AlkPhos  44  11-12      proBNP:   Lipid Profile: Cholesterol 217 mg/dL, Triglyceride 121 mg/dL,  mg/dL, HDL 55 mg/dL, [11-13-21]  HgA1c: A1C with Estimated Average Glucose (11.13.21 @ 08:22)    A1C with Estimated Average Glucose Result: 5.3%    Estimated Average Glucose: 105 mg/dL    TSH: Thyroid Stimulating Hormone, Serum: 1.910 uIU/mL (11-13-21 @ 08:20)          RADIOLOGY & ADDITIONAL STUDIES:    ECG: Personally reviewed  11/12: NSR    Telemetry: reviewed    Echo: pending    CXR: Personally reviewed  < from: Xray Chest 1 View AP/PA (11.12.21 @ 17:39) >  Impression: No acute abnormality visualized.    --- End of Report ---    < end of copied text >    Other misc imaging: none  < from: CT Perfusion w/ Maps w/ IV Cont (11.12.21 @ 17:55) >  EXAM:  CT PERFUSION W MAPS IC                        EXAM:  CT ANGIO NECK (W)AW IC                        EXAM:  CT ANGIO BRAIN (W)AW IC                           PROCEDURE DATE:  11/12/2021      < end of copied text >  < from: CT Perfusion w/ Maps w/ IV Cont (11.12.21 @ 17:55) >  IMPRESSION:    CT PERFUSION: Suggestion of prolonged Tmax in the right temporal lobe and adjacent right cerebellar hemisphere, favored to be artifactual in nature. (Tmax >6.0s volume: 16 mL)  ?  CT ANGIOGRAPHY NECK: No vessel narrowing or occlusion in the neck.  ?  CT ANGIOGRAPHY HEAD: No evidence of vascular stenosis, occlusion, aneurysm or vascular malformation.  _____________  NASCET criteria for internal carotid artery stenosis:  Mild: 0% to 49%  Moderate: 50% to 69%  Severe: 70% to 99%  Complete Occlusion    --- End of Report ---    < end of copied text >    < from: CT Brain Stroke Protocol (11.12.21 @ 17:49) >  IMPRESSION:    No acute intracranial hemorrhage or demarcated territorial infarction.    Communication:  The last image was acquired at 5:22 PM on 11/12/2021. Dr. Murillo discussed the impression of this report with Dr. Duarte at 5:24 PM on 11/12/2021. This verbal communication supplements the text report of this document.    --- End of Report ---      < end of copied text >   Patient seen and evaluated at bedside    HPI: 51y F with PMHx of vertigo initially presented to [facility] for dizziness, right hand dysmetria and right leg weakness, LKW 11/12 10:30AM. At [facility], CTH without hemorrhage. CTA with no steno-occlusive disease. CTP with prolonged Tmax in the right temporal lobe and right cerebellar lobe thought to be artifact per report. Patient out of tpa window. She was transferred to [facility] for stroke work up.     PMHx:   Vertigo      PSHx:     FAMILY HISTORY:    Allergies:  sulfa drugs (Unknown)    Home Medications:    Current Medications:   acetaminophen     Tablet .. 650 milliGRAM(s) Oral every 6 hours PRN  aspirin  chewable 81 milliGRAM(s) Oral daily  atorvastatin 40 milliGRAM(s) Oral at bedtime  clopidogrel Tablet 75 milliGRAM(s) Oral once  enoxaparin Injectable 40 milliGRAM(s) SubCutaneous daily  melatonin 5 milliGRAM(s) Oral at bedtime  ondansetron Injectable 4 milliGRAM(s) IV Push every 6 hours PRN    Social History  Smoking History: denies  Alcohol Use: denies  Drug Use: denies    REVIEW OF SYSTEMS:  Constitutional:     [X] negative [ ] fevers [ ] chills [ ] weight loss [ ] weight gain  HEENT:                  [X] negative [ ] dry eyes [ ] eye irritation [ ] postnasal drip [ ] nasal congestion  CV:                         [X] negative  [ ] chest pain [ ] orthopnea [ ] palpitations [ ] murmur  Resp:                     [X] negative [ ] cough [ ] shortness of breath [ ] wheezing [ ] sputum [ ] hemoptysis  GI:                          [X] negative [ ] nausea [ ] vomiting [ ] diarrhea [ ] constipation [ ] abd pain [ ] dysphagia   :                        [X] negative [ ] dysuria [ ] nocturia [ ] hematuria [ ] increased urinary frequency  MSK:                      [X] negative [ ] back pain [ ] myalgias [ ] arthralgias [ ] fracture  Skin:                       [X] negative [ ] rash [ ] itch  Neuro:                   [ ] negative [ ] headache [X] dizziness [ ] syncope [ ] weakness [ ] numbness  Psych:                    [X] negative [ ] anxiety [ ] depression  Endo:                     [X] negative [ ] diabetes [ ] thyroid problem  Heme/Lymph:      [X] negative [ ] anemia [ ] bleeding problem  Allergic/Immune: [X] negative [ ] itchy eyes [ ] nasal discharge [ ] hives [ ] angioedema    [X] All other systems negative or otherwise described above.  [ ] Unable to assess ROS because ________.    ICU Vital Signs Last 24 Hrs  T(C): 36.8 (13 Nov 2021 17:30), Max: 37.1 (13 Nov 2021 13:49)  T(F): 98.2 (13 Nov 2021 17:30), Max: 98.7 (13 Nov 2021 13:49)  HR: 66 (13 Nov 2021 16:19) (52 - 78)  BP: 156/91 (13 Nov 2021 16:19) (127/83 - 172/84)  BP(mean): 117 (13 Nov 2021 16:19) (97 - 124)  ABP: --  ABP(mean): --  RR: 18 (13 Nov 2021 16:19) (14 - 18)  SpO2: 97% (13 Nov 2021 16:19) (97% - 100%)    Orthostatic VS    Daily     Daily   I&O's Summary      Physical Exam:  GENERAL: No acute distress, well-developed  HEAD:  Atraumatic, Normocephalic  ENT: EOMI, conjunctiva and sclera clear, Neck supple, No JVD, moist mucosa  CHEST/LUNG: Clear to auscultation bilaterally; No wheeze, equal breath sounds bilaterally   BACK: No spinal tenderness  HEART: Regular rate and rhythm; No murmurs, rubs, or gallops, radial and DP 2+ b/l, euvolemic  ABDOMEN: Soft, Nontender, Nondistended  EXTREMITIES:  No clubbing, cyanosis, or edema  PSYCH: Nl behavior, nl affect  NEUROLOGY: AAOx3  SKIN: Normal color, No rashes or lesions  LINES: no central lines present    LABS:                        13.6   9.60  )-----------( 338      ( 13 Nov 2021 08:22 )             43.6     PT/INR - ( 12 Nov 2021 17:28 )   PT: 12.3 sec;   INR: 1.03          PTT - ( 12 Nov 2021 17:28 )  PTT:28.5 sec  11-13    140  |  105  |  9   ----------------------------<  110<H>  4.3   |  28  |  1.00    Ca    9.4      13 Nov 2021 08:20  Phos  4.0     11-13  Mg     2.2     11-13    TPro  7.0  /  Alb  3.7  /  TBili  0.3  /  DBili  x   /  AST  14<L>  /  ALT  13  /  AlkPhos  44  11-12      proBNP:   Lipid Profile: Cholesterol 217 mg/dL, Triglyceride 121 mg/dL,  mg/dL, HDL 55 mg/dL, [11-13-21]  HgA1c: A1C with Estimated Average Glucose (11.13.21 @ 08:22)    A1C with Estimated Average Glucose Result: 5.3%    Estimated Average Glucose: 105 mg/dL    TSH: Thyroid Stimulating Hormone, Serum: 1.910 uIU/mL (11-13-21 @ 08:20)          RADIOLOGY & ADDITIONAL STUDIES:    ECG: Personally reviewed  11/12: NSR    Telemetry: reviewed    Echo: pending    CXR: Personally reviewed  < from: Xray Chest 1 View AP/PA (11.12.21 @ 17:39) >  Impression: No acute abnormality visualized.    --- End of Report ---    < end of copied text >    Other misc imaging: none  < from: CT Perfusion w/ Maps w/ IV Cont (11.12.21 @ 17:55) >  EXAM:  CT PERFUSION W MAPS IC                        EXAM:  CT ANGIO NECK (W)AW IC                        EXAM:  CT ANGIO BRAIN (W)AW IC                           PROCEDURE DATE:  11/12/2021      < end of copied text >  < from: CT Perfusion w/ Maps w/ IV Cont (11.12.21 @ 17:55) >  IMPRESSION:    CT PERFUSION: Suggestion of prolonged Tmax in the right temporal lobe and adjacent right cerebellar hemisphere, favored to be artifactual in nature. (Tmax >6.0s volume: 16 mL)  ?  CT ANGIOGRAPHY NECK: No vessel narrowing or occlusion in the neck.  ?  CT ANGIOGRAPHY HEAD: No evidence of vascular stenosis, occlusion, aneurysm or vascular malformation.  _____________  NASCET criteria for internal carotid artery stenosis:  Mild: 0% to 49%  Moderate: 50% to 69%  Severe: 70% to 99%  Complete Occlusion    --- End of Report ---    < end of copied text >    < from: CT Brain Stroke Protocol (11.12.21 @ 17:49) >  IMPRESSION:    No acute intracranial hemorrhage or demarcated territorial infarction.    Communication:  The last image was acquired at 5:22 PM on 11/12/2021. Dr. Murillo discussed the impression of this report with Dr. Duarte at 5:24 PM on 11/12/2021. This verbal communication supplements the text report of this document.    --- End of Report ---      < end of copied text >

## 2021-11-13 NOTE — OCCUPATIONAL THERAPY INITIAL EVALUATION ADULT - ADDITIONAL COMMENTS
Pt lives alone in an apartment with elevator access, no MACARIO. Prior to admit, pt states she was independent with ADLs and mobility, did not require any DME or AD.

## 2021-11-13 NOTE — PHYSICAL THERAPY INITIAL EVALUATION ADULT - IMPAIRMENTS FOUND, PT EVAL
aerobic capacity/endurance/fine motor/gait, locomotion, and balance/muscle strength/neuromotor development and sensory integration/poor safety awareness/posture

## 2021-11-13 NOTE — PROGRESS NOTE ADULT - SUBJECTIVE AND OBJECTIVE BOX
Neurology Stroke Progress Note    INTERVAL HPI/OVERNIGHT EVENTS:  Patient seen and examined. Pt states she feels a lot better with her coordination than yesterday but still feels uncoordinated and that her speech is off. Otherwise denies any new weakness, numbness, or speech problems.     MEDICATIONS  (STANDING):  aspirin  chewable 81 milliGRAM(s) Oral daily  clopidogrel Tablet 75 milliGRAM(s) Oral once  enoxaparin Injectable 40 milliGRAM(s) SubCutaneous daily  melatonin 5 milliGRAM(s) Oral at bedtime    MEDICATIONS  (PRN):  acetaminophen     Tablet .. 650 milliGRAM(s) Oral every 6 hours PRN Moderate Pain (4 - 6)  ondansetron Injectable 4 milliGRAM(s) IV Push every 6 hours PRN Nausea and/or Vomiting      Allergies    sulfa drugs (Unknown)    Intolerances        Vital Signs Last 24 Hrs  T(C): 36.8 (13 Nov 2021 10:32), Max: 36.8 (13 Nov 2021 05:33)  T(F): 98.2 (13 Nov 2021 10:32), Max: 98.3 (13 Nov 2021 05:33)  HR: 70 (13 Nov 2021 08:14) (52 - 75)  BP: 143/82 (13 Nov 2021 08:14) (136/69 - 172/84)  BP(mean): 108 (13 Nov 2021 08:14) (97 - 124)  RR: 18 (13 Nov 2021 08:14) (14 - 18)  SpO2: 98% (13 Nov 2021 08:14) (95% - 100%)    Physical exam:  General: No acute distress, awake and alert  Eyes: Anicteric sclerae, moist conjunctivae, see below for CNs  Neck: trachea midline,  Cardiovascular: Regular rate and rhythm, no murmurs  Pulmonary: Anterior breath sounds clear bilaterally No use of accessory muscles  GI: Abdomen soft, non-distended, non-tender  Extremities: no edema    Neurologic:  -Mental status: Awake, alert, oriented to person, place, and time. Speech is fluent with intact naming, repetition, and comprehension, +mild dysarthria. Recent and remote memory intact. Follows commands. Attention/concentration intact. Fund of knowledge appropriate.  -Cranial nerves:   II: Visual fields are full to confrontation.  III, IV, VI: Extraocular movements are intact without nystagmus. Pupils equally round and reactive to light  V:  Facial sensation V1-V3 equal and intact   VII: Face is symmetric   Motor: Normal bulk and tone. No pronator drift. Strength bilateral upper extremity 5/5, bilateral lower extremities 5/5.  Rapid alternating movements intact and symmetric  Sensation: Intact to light touch bilaterally. No neglect or extinction on double simultaneous testing.  Coordination: +dysmetria on right finger-to-nose and slight dysmetria of right heel-to-shin. No dysmetria of left side    LABS:                        13.6   9.60  )-----------( 338      ( 13 Nov 2021 08:22 )             43.6     11-13    140  |  105  |  9   ----------------------------<  110<H>  4.3   |  28  |  1.00    Ca    9.4      13 Nov 2021 08:20  Phos  4.0     11-13  Mg     2.2     11-13    TPro  7.0  /  Alb  3.7  /  TBili  0.3  /  DBili  x   /  AST  14<L>  /  ALT  13  /  AlkPhos  44  11-12    PT/INR - ( 12 Nov 2021 17:28 )   PT: 12.3 sec;   INR: 1.03          PTT - ( 12 Nov 2021 17:28 )  PTT:28.5 sec      RADIOLOGY & ADDITIONAL TESTS:

## 2021-11-13 NOTE — PHYSICAL THERAPY INITIAL EVALUATION ADULT - MODALITIES TREATMENT COMMENTS
Cranial Nerves II - XII: II: PERRLA; visual fields are full to confrontation III, IV, VI: EOMI, no nystagmus appreciated V: facial sensation intact to light touch V1-V3 b/l VII: no ptosis, no facial droop, symmetric eyebrow raise and closure VIII: hearing intact to finger rub b/l  XI: head turning and shoulder shrug intact b/l XII: tongue protrusion midline

## 2021-11-13 NOTE — PHYSICAL THERAPY INITIAL EVALUATION ADULT - BED MOBILITY LIMITATIONS, REHAB EVAL
Pt performed supine to sit slowly in case of dizziness, reported 4/10 dizziness with sitting. Pt climbed into bed on knees to return to supine.

## 2021-11-13 NOTE — SPEECH LANGUAGE PATHOLOGY EVALUATION - COMMENTS
Speech strategies for improving articulatory precision were reviewed such as over-articulation and syllable segmentation. Pt is already beginning to generalize these strategies. If complaints persist, can consider short-term speech therapy to improve motor speech skills.    Goal: Pt will independently use the over-articulation strategy to improve speech intelligibility in conversation with 100% accuracy.

## 2021-11-13 NOTE — OCCUPATIONAL THERAPY INITIAL EVALUATION ADULT - GENERAL OBSERVATIONS, REHAB EVAL
MRS 4. Pt received semi-supine in bed, +heplock, +tele, in NAD and agreeable to OT. Cleared by JOE Avina.

## 2021-11-13 NOTE — OCCUPATIONAL THERAPY INITIAL EVALUATION ADULT - PLANNED THERAPY INTERVENTIONS, OT EVAL
ADL retraining/balance training/bed mobility training/cognitive, visual perceptual/fine motor coordination training/motor coordination training/parent/caregiver training.../strengthening/transfer training

## 2021-11-13 NOTE — OCCUPATIONAL THERAPY INITIAL EVALUATION ADULT - MODIFIED CLINICAL TEST OF SENSORY INTEGRATION IN BALANCE TEST
Pt able to ambulate ~100'x2 with CGA, L hand pushing portable monitor, narrow NORAH, 1 LOB noted with pt veering towards R side, required verbal cues to correct.

## 2021-11-13 NOTE — PHYSICAL THERAPY INITIAL EVALUATION ADULT - FUNCTIONAL LIMITATIONS, PT EVAL
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

## 2021-11-13 NOTE — PHYSICAL THERAPY INITIAL EVALUATION ADULT - PLANNED THERAPY INTERVENTIONS, PT EVAL
balance training/bed mobility training/gait training/motor coordination training/neuromuscular re-education/postural re-education/ROM/strengthening/transfer training

## 2021-11-13 NOTE — OCCUPATIONAL THERAPY INITIAL EVALUATION ADULT - DIAGNOSIS, OT EVAL
Pt presents with impaired balance, decreased activity tolerance, and decreased RUE coordination and dysmetria impacting overall ability to perform ADLs and transfers.

## 2021-11-13 NOTE — PHYSICAL THERAPY INITIAL EVALUATION ADULT - PERTINENT HX OF CURRENT PROBLEM, REHAB EVAL
51y Female with PMHx of vertigo initially presented to [facility] for dizziness, right hand dysmetria and right leg weakness, LKW 11/12 10:30AM. At [facility], CTH without hemorrhage. CTA with no steno-occlusive disease. CTP with prolonged Tmax in the right temporal lobe and right cerebellar lobe thought to be artifact per report. Patient out of tpa window. She was transferred to [facility] for stroke work up.

## 2021-11-13 NOTE — PHYSICAL THERAPY INITIAL EVALUATION ADULT - GAIT PATTERN USED, PT EVAL
Pt ambulates with mild ataxic gait. Trialed ambulation without assist and pt intermittenly holding hands upright to balance. Pt improves stability with R HHA and LUE pushing portable monitor. decreased attention to R, intermittently bumping into walls/objects on R/2-point gait Pt ambulates with mild ataxic gait. Trialed ambulation without assist and pt intermittenly holding hands upright to balance. Pt improves stability with R HHA and LUE pushing portable monitor. decreased attention to R, intermittently bumping into walls/objects on R. Pt able to ambulate with looking R/L without LOB./2-point gait

## 2021-11-13 NOTE — PHYSICAL THERAPY INITIAL EVALUATION ADULT - ADDITIONAL COMMENTS
Pt lives in an apartment with no MACARIO and has elevator access. She has a villegas bed and is able to perform all ADLs. She uses walking as her main mode of transportation but uses the train sometimes as well.

## 2021-11-13 NOTE — CONSULT NOTE ADULT - SUBJECTIVE AND OBJECTIVE BOX
Patient is a 51y old  Female who presents with a chief complaint of dizziness, R dysmetria (13 Nov 2021 10:56)       HPI:   **STROKE HPI***    HPI: 51y Female with PMHx of vertigo initially presented to [facility] for dizziness, right hand dysmetria and right leg weakness. Patient woke up 11/12 10:30AM in [facility]. 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 She tried to continue with her call but noticed that she had some word finding difficulties, slurred speech and difficulty navigating the computer with her right hand (right hand dominant). Her dizziness is much more severe compared to her prior vertigo episodes. Her dizziness does improve with her eyes closed and turning onto her right side. At [facility], CTH without hemorrhage. CTA with no steno-occlusive disease. CTP with prolonged Tmax in the right temporal lob and right cerebellar lobe thought to be artifact. Patient was loaded with ASA 325mg and transferred to [facility] for stroke work up.     At bedside, patient's symptoms have not improved. Her dizziness is described as the room spinning. Her right hand dysmetria is still present and when attempting to walk at [facility], she had to hold onto the wall to support herself as she felt that her right leg was dragging. She denies CP, SOB, changes in vision, decrease sensation/numbness along her extremities. She last saw her PCP 1 year ago. Her mother has 2 miscarriages and provoked b/l LE DVTs (secondary to b/l leg fractures). Patient has never been pregnant and denies any family hx of blood disorders.        (12 Nov 2021 22:20)      PAST MEDICAL & SURGICAL HISTORY:  Vertigo        MEDICATIONS  (STANDING):  aspirin  chewable 81 milliGRAM(s) Oral daily  atorvastatin 40 milliGRAM(s) Oral at bedtime  clopidogrel Tablet 75 milliGRAM(s) Oral once  enoxaparin Injectable 40 milliGRAM(s) SubCutaneous daily  melatonin 5 milliGRAM(s) Oral at bedtime    MEDICATIONS  (PRN):  acetaminophen     Tablet .. 650 milliGRAM(s) Oral every 6 hours PRN Moderate Pain (4 - 6)  ondansetron Injectable 4 milliGRAM(s) IV Push every 6 hours PRN Nausea and/or Vomiting        Social History:                -  Home Living Status:  lives alone in an elevator accessible apartment building         -  Prior Home Care Services: none           -  Occupation: chreographer    Baseline Functional Level Prior to Admission:             - ADL's/ IADL's:  fully independent         - ambulatory status PTA:  walked without assistive devices    FAMILY HISTORY:      CBC Full  -  ( 13 Nov 2021 08:22 )  WBC Count : 9.60 K/uL  RBC Count : 4.81 M/uL  Hemoglobin : 13.6 g/dL  Hematocrit : 43.6 %  Platelet Count - Automated : 338 K/uL  Mean Cell Volume : 90.6 fl  Mean Cell Hemoglobin : 28.3 pg  Mean Cell Hemoglobin Concentration : 31.2 gm/dL  Auto Neutrophil # : x  Auto Lymphocyte # : x  Auto Monocyte # : x  Auto Eosinophil # : x  Auto Basophil # : x  Auto Neutrophil % : x  Auto Lymphocyte % : x  Auto Monocyte % : x  Auto Eosinophil % : x  Auto Basophil % : x      11-13    140  |  105  |  9   ----------------------------<  110<H>  4.3   |  28  |  1.00    Ca    9.4      13 Nov 2021 08:20  Phos  4.0     11-13  Mg     2.2     11-13    TPro  7.0  /  Alb  3.7  /  TBili  0.3  /  DBili  x   /  AST  14<L>  /  ALT  13  /  AlkPhos  44  11-12            Radiology:    < from: CT Brain Stroke Protocol (11.12.21 @ 17:49) >  EXAM:  CT BRAIN STROKE PROTOCOL                           PROCEDURE DATE:  11/12/2021          INTERPRETATION:  *******************OFFICIAL ATTENDING REPORT*******************    CLINICAL INDICATION: Dizziness and dysarthria.    TECHNIQUE: CT of the head was performed without the administration of intravenous contrast.    COMPARISON: None available.    FINDINGS:    No evidence of acute infarction, intracranial hemorrhage or mass lesion.    The ventricles are normal without evidence of hydrocephalus. There are no extra-axial fluid collections.    The visualized intraorbital contents are normal. The imaged portions of the paranasal sinuses are clear. The mastoid air cells are clear. The visualized soft tissues and osseous structures appear normal.    IMPRESSION:    No acute intracranial findings.    Dr. Murillo discussed the impression of this report with Dr. Duarte at 5:24 PM on 11/12/2021.      ------------------------------------------------------------------------------------------    PROCEDURE: CT head without intravenous contrast    INDICATION: Stroke code. Pain in left neck with dizziness and dysarthria.    TECHNIQUE:  Serial axial images were obtained from the skull base to the vertex without the use of intravenous contrast. Coronal and sagittal reconstructions were created.    COMPARISON EXAMINATION: None available.    FINDINGS:  VENTRICLES AND SULCI: The ventricles and sulci are normal in appearance and commensurate with the patient's age. No hydrocephalus.  EXTRA-AXIAL: No extra-axial fluid collection is present.  INTRA-AXIAL: No space-occupying intraparenchymal mass, hemorrhage, or midline shift is present. No acute transcortical infarction identified.  VISUALIZED ORBITS: Grossly unremarkable.  VISUALIZED SINUSES: No air-fluid levels.  VISUALIZED MASTOIDS: Well aerated.  CALVARIUM: No fracture identified.    IMPRESSION:    No acute intracranial hemorrhage or demarcated territorial infarction.      < from: CT Perfusion w/ Maps w/ IV Cont (11.12.21 @ 17:55) >    EXAM:  CT PERFUSION W MAPS IC                        EXAM:  CT ANGIO NECK (W)AW IC                        EXAM:  CT ANGIO BRAIN (W)AW IC                           PROCEDURE DATE:  11/12/2021          INTERPRETATION:  CLINICAL INDICATION: Dizziness and dysarthria. Code stroke.    TECHNIQUE:  CT PERFUSION: During a contrast bolus of 40 mL Isovue-370, serial 5 mm transaxial cuts of perfusion data were obtained intracranially. Following this, cerebral blood flow, cerebral blood volume and mean transit time perfusion color maps were generated from the perfusion source images on a separate workstation and reviewed. Perfusion data was also processed utilizing RAPID software.    CTA of the head and neck was performed after the intravenous administration of 80 mL Isovue-370. 3-D reconstructions were performed on a separate workstation and reviewed.    COMPARISON: CT head 11/12/2021.    FINDINGS:    CT DYNAMIC PERFUSION:    There is uniform distribution of cerebral blood volume and cerebral bloodflow bilaterally.  There is suggestion of prolonged Tmax in the right temporal lobe and adjacent right cerebellar hemisphere, which is favored to be artifactual in nature.    CBF <30% volume: 0 mL  Tmax >6.0s volume: 16 mL  Mismatch volume: 16  Mismatch ratio: Infinite      CTA NECK:    The visualized portion of the aortic arch is unremarkable in appearance. The origins of the great vessels and the vertebral arteries are normal without evidence of stenosis.    The common carotid arteries are patent bilaterally without evidence of stenosis. There is no narrowing of the cervical internal carotid arteries bilaterally.    The vertebral arteries are patent bilaterally throughout their course. There is a dominant left vertebral artery.      CTA HEAD:    Evaluation of the anterior circulation demonstrates patent distal internal carotid arteries.  The proximal anterior, middle and posterior cerebral arteries are patent bilaterally. There is a developmentally hypoplastic right A1 segment.    Evaluation of the posterior circulation demonstrates patent vertebrobasilar system. Note, the right vertebral artery appears to terminate in the right PICA, anatomic variant. There is a hypoplastic right P1 segment with a prominent right posterior communicating artery, findings consistent with fetal origin of the right PCA, anatomic variant.    No evidence of vascular stenosis, occlusion, aneurysm or vascular malformation in the [place].      IMPRESSION:    CT PERFUSION: Suggestion of prolonged Tmax in the right temporal lobe and adjacent right cerebellar hemisphere, favored to be artifactual in nature. (Tmax >6.0s volume: 16 mL)  ?  CT ANGIOGRAPHY NECK: No vessel narrowing or occlusion in the neck.  ?  CT ANGIOGRAPHY HEAD: No evidence of vascular stenosis, occlusion, aneurysm or vascular malformation.          Vital Signs Last 24 Hrs  T(C): 37.1 (13 Nov 2021 13:49), Max: 37.1 (13 Nov 2021 13:49)  T(F): 98.7 (13 Nov 2021 13:49), Max: 98.7 (13 Nov 2021 13:49)  HR: 78 (13 Nov 2021 12:16) (52 - 78)  BP: 127/83 (13 Nov 2021 12:16) (127/83 - 172/84)  BP(mean): 102 (13 Nov 2021 12:16) (97 - 124)  RR: 18 (13 Nov 2021 12:16) (14 - 18)  SpO2: 98% (13 Nov 2021 12:16) (95% - 100%)        REVIEW OF SYSTEMS:    CONSTITUTIONAL: No fever, weight loss, or fatigue  EYES: No eye pain, visual disturbances, or discharge  ENMT:  No difficulty hearing, tinnitus, vertigo; No sinus or throat pain  NECK: No pain or stiffness  BREASTS: No pain, masses, or nipple discharge  RESPIRATORY: No cough, wheezing, chills or hemoptysis; No shortness of breath  CARDIOVASCULAR: No chest pain, palpitations, dizziness, or leg swelling  GASTROINTESTINAL: No abdominal or epigastric pain. No nausea, vomiting, or hematemesis; No diarrhea or constipation. No melena or hematochezia.  GENITOURINARY: No dysuria, frequency, hematuria, or incontinence  NEUROLOGICAL:  as per HPI  SKIN: No itching, burning, rashes, or lesions   LYMPH NODES: No enlarged glands  ENDOCRINE: No heat or cold intolerance; No hair loss  MUSCULOSKELETAL: No joint pain or swelling; No muscle, back, or extremity pain  PSYCHIATRIC: No depression, anxiety, mood swings, or difficulty sleeping  HEME/LYMPH: No easy bruising, or bleeding gums  ALLERGY AND IMMUNOLOGIC: No hives or eczema  VASCULAR: no swelling, erythema,           Physical Exam: WDWN 50 yo  woman lying in semi Christine's position, awake, alert, conversant, no acute complaints of dizziness    Head: normocephalic, atraumatic    Eyes: PERRLA, EOMI, no nystagmus, sclera anicteric    ENT: nasal discharge, uvula midline, no oropharyngeal erythema/exudate    Neck: supple, negative JVD, negative carotid bruits, no thyromegaly    Chest: CTA bilaterally, neg wheeze/ rhonchi/ rales/ crackles/ egophany    Cardiovascular: regular rate and rhythm, neg murmurs/rubs/gallops    Abdomen: soft, non distended, non tender to palpation in all 4 quadrants, negative rebound/guarding, normal bowel sounds    Extremities: WWP, neg cyanosis/clubbing/edema, negative calf tenderness to palpation, negative Angelica's sign    Neurologic Exam:    Alert and oriented to person, place, date/year, speech fluent w/o dysarthria, follows commands, recent and remote memory intact, repetition intact, comprehension intact,  attention/concentration intact, fund of knowledge appropriate    Cranial Nerves:     II:                         pupils equal, round and reactive to light, visual fields intact   III/ IV/VI:              extraocular movements intact, neg nystagmus, neg ptosis  V:                        facial sensation intact, V1-3 normal  VII:                      face symmetric, no droop, normal eye closure and smile  VIII:                     hearing intact to finger rub bilaterally  IX and X:             no hoarseness, gag intact, palate/ uvula rise symmetrically  XI:                       SCM/ trapezius strength intact bilateral  XII:                      no tongue deviation    Motor Exam:    Right UE:             : 5/5                             wrist extensors/ flexors: 5/5                             biceps:   5/5                             triceps:  5/5                             deltoid:  5/5                             pronator drift: neg    Left UE:               :  5/5                             wrist extensors/ flexors:  5/5                             biceps:    5/5                             triceps:   5/5                             deltoid:  5/5                             pronator drift: neg      Right LE:             dorsiflexors:  5/5                             plantar flexors:  5/5                             quadriceps:  5/5                             hamstrings:  5/5                             hip flexors:  5/5    Left LE:               dorsiflexors:  5/5                            plantar flexors:  5/5                            quadriceps:  5/5                            hamstrings:  5/5                            hip flexors:  5/5               Sensation:           intact to light touch x 4 extremities                            No neglect or extinction on double simultaneous testing                       DTR:                  biceps/brachioradialis: equal                            R:       L:                              patella/ankle: equal                             R:      L:                              neg clonus                           neg Babinski                        Coordination:                            Finger to Nose:  neg dysmetria bilaterally                          R:   slight dysmetria         L:  wnl                            Heel to shin: wnl bilaterally                         Gait:  not tested        PM&R Impression:    1)  concern for R cerebellar stroke      Recommendations/ Plan :    1) Physical therapy focusing on therapeutic exercises, bed mobility/transfer out of bed evaluation, progressive ambulation with assistive devices prn.    2) Anticipated Disposition Plan/Recs:    d/c home, outpatient PT/OT

## 2021-11-13 NOTE — OCCUPATIONAL THERAPY INITIAL EVALUATION ADULT - LEVEL OF INDEPENDENCE: GROOMING, OT EVAL
wash hands standing at sink, verbal cues to guide pt to locate items as pt with R sided dysmetria/contact guard

## 2021-11-14 LAB
ANION GAP SERPL CALC-SCNC: 10 MMOL/L — SIGNIFICANT CHANGE UP (ref 5–17)
BUN SERPL-MCNC: 17 MG/DL — SIGNIFICANT CHANGE UP (ref 7–23)
CALCIUM SERPL-MCNC: 8.9 MG/DL — SIGNIFICANT CHANGE UP (ref 8.4–10.5)
CHLORIDE SERPL-SCNC: 106 MMOL/L — SIGNIFICANT CHANGE UP (ref 96–108)
CO2 SERPL-SCNC: 24 MMOL/L — SIGNIFICANT CHANGE UP (ref 22–31)
CREAT SERPL-MCNC: 0.97 MG/DL — SIGNIFICANT CHANGE UP (ref 0.5–1.3)
GLUCOSE SERPL-MCNC: 107 MG/DL — HIGH (ref 70–99)
HCT VFR BLD CALC: 42.5 % — SIGNIFICANT CHANGE UP (ref 34.5–45)
HCYS SERPL-MCNC: 10.7 UMOL/L — SIGNIFICANT CHANGE UP
HGB BLD-MCNC: 13.4 G/DL — SIGNIFICANT CHANGE UP (ref 11.5–15.5)
MAGNESIUM SERPL-MCNC: 2.4 MG/DL — SIGNIFICANT CHANGE UP (ref 1.6–2.6)
MCHC RBC-ENTMCNC: 28.3 PG — SIGNIFICANT CHANGE UP (ref 27–34)
MCHC RBC-ENTMCNC: 31.5 GM/DL — LOW (ref 32–36)
MCV RBC AUTO: 89.9 FL — SIGNIFICANT CHANGE UP (ref 80–100)
NRBC # BLD: 0 /100 WBCS — SIGNIFICANT CHANGE UP (ref 0–0)
PHOSPHATE SERPL-MCNC: 3.8 MG/DL — SIGNIFICANT CHANGE UP (ref 2.5–4.5)
PLATELET # BLD AUTO: 338 K/UL — SIGNIFICANT CHANGE UP (ref 150–400)
POTASSIUM SERPL-MCNC: 3.8 MMOL/L — SIGNIFICANT CHANGE UP (ref 3.5–5.3)
POTASSIUM SERPL-SCNC: 3.8 MMOL/L — SIGNIFICANT CHANGE UP (ref 3.5–5.3)
RBC # BLD: 4.73 M/UL — SIGNIFICANT CHANGE UP (ref 3.8–5.2)
RBC # FLD: 12.9 % — SIGNIFICANT CHANGE UP (ref 10.3–14.5)
SODIUM SERPL-SCNC: 140 MMOL/L — SIGNIFICANT CHANGE UP (ref 135–145)
WBC # BLD: 9.59 K/UL — SIGNIFICANT CHANGE UP (ref 3.8–10.5)
WBC # FLD AUTO: 9.59 K/UL — SIGNIFICANT CHANGE UP (ref 3.8–10.5)

## 2021-11-14 PROCEDURE — 99233 SBSQ HOSP IP/OBS HIGH 50: CPT

## 2021-11-14 RX ADMIN — Medication 650 MILLIGRAM(S): at 23:00

## 2021-11-14 RX ADMIN — Medication 650 MILLIGRAM(S): at 15:45

## 2021-11-14 RX ADMIN — ATORVASTATIN CALCIUM 40 MILLIGRAM(S): 80 TABLET, FILM COATED ORAL at 22:15

## 2021-11-14 RX ADMIN — Medication 650 MILLIGRAM(S): at 22:30

## 2021-11-14 RX ADMIN — ENOXAPARIN SODIUM 40 MILLIGRAM(S): 100 INJECTION SUBCUTANEOUS at 11:26

## 2021-11-14 RX ADMIN — Medication 81 MILLIGRAM(S): at 11:26

## 2021-11-14 RX ADMIN — Medication 5 MILLIGRAM(S): at 22:15

## 2021-11-14 RX ADMIN — CLOPIDOGREL BISULFATE 75 MILLIGRAM(S): 75 TABLET, FILM COATED ORAL at 11:25

## 2021-11-14 RX ADMIN — Medication 650 MILLIGRAM(S): at 03:58

## 2021-11-14 RX ADMIN — Medication 650 MILLIGRAM(S): at 14:46

## 2021-11-14 NOTE — PROGRESS NOTE ADULT - SUBJECTIVE AND OBJECTIVE BOX
Neurology Stroke Progress Note    INTERVAL HPI/OVERNIGHT EVENTS:  Patient seen and examined. She states she does have to work harder to enunciate, and when she's tired her words are more slurred. She feels like the right side is still a little weaker than the left. She denies new numbness, vision changes, or word finding difficulty. Denies CP, SOB, or abdominal pain.    MEDICATIONS  (STANDING):  aspirin  chewable 81 milliGRAM(s) Oral daily  atorvastatin 40 milliGRAM(s) Oral at bedtime  clopidogrel Tablet 75 milliGRAM(s) Oral daily  enoxaparin Injectable 40 milliGRAM(s) SubCutaneous daily  melatonin 5 milliGRAM(s) Oral at bedtime    MEDICATIONS  (PRN):  acetaminophen     Tablet .. 650 milliGRAM(s) Oral every 6 hours PRN Moderate Pain (4 - 6)  ondansetron Injectable 4 milliGRAM(s) IV Push every 6 hours PRN Nausea and/or Vomiting      Allergies    sulfa drugs (Unknown)    Intolerances        Vital Signs Last 24 Hrs  T(C): 37.1 (14 Nov 2021 06:30), Max: 37.2 (14 Nov 2021 01:08)  T(F): 98.7 (14 Nov 2021 06:30), Max: 98.9 (14 Nov 2021 01:08)  HR: 62 (14 Nov 2021 08:25) (50 - 78)  BP: 138/81 (14 Nov 2021 08:25) (127/83 - 167/91)  BP(mean): 102 (14 Nov 2021 08:25) (101 - 117)  RR: 18 (14 Nov 2021 08:25) (16 - 18)  SpO2: 98% (14 Nov 2021 08:25) (95% - 98%)    Physical exam:  General: No acute distress, awake and alert  Eyes: Anicteric sclerae, moist conjunctivae, see below for CNs  Neck: trachea midline, FROM, supple, no thyromegaly or lymphadenopathy  Cardiovascular: Regular rate and rhythm, no murmurs, rubs, or gallops. No carotid bruits.   Pulmonary: Anterior breath sounds clear bilaterally, no crackles or wheezing. No use of accessory muscles  GI: Abdomen soft, non-distended, non-tender  Extremities: Radial and DP pulses +2, no edema    Neurologic:  -Mental status: Awake, alert, oriented to person, place, and time. Speech is fluent with intact naming, repetition, and comprehension, +mild dysarthria. Recent and remote memory intact. Follows commands. Attention/concentration intact. Fund of knowledge appropriate.  -Cranial nerves:   II: Visual fields are full to confrontation.  III, IV, VI: Extraocular movements are intact without nystagmus. Pupils equally round and reactive to light  V:  Facial sensation V1-V3 equal and intact   VII: Face is symmetric   Motor: Normal bulk and tone. No pronator drift. Strength bilateral upper extremity 5/5, bilateral lower extremities 5/5.  Rapid alternating movements intact and symmetric  Sensation: Intact to light touch bilaterally. No neglect or extinction on double simultaneous testing.  Coordination: +dysmetria on right finger-to-nose and slight dysmetria of right heel-to-shin. No dysmetria of left side      LABS:                        13.4   9.59  )-----------( 338      ( 14 Nov 2021 08:38 )             42.5     11-14    140  |  106  |  17  ----------------------------<  107<H>  3.8   |  24  |  0.97    Ca    8.9      14 Nov 2021 08:38  Phos  3.8     11-14  Mg     2.4     11-14    TPro  7.0  /  Alb  3.7  /  TBili  0.3  /  DBili  x   /  AST  14<L>  /  ALT  13  /  AlkPhos  44  11-12    PT/INR - ( 12 Nov 2021 17:28 )   PT: 12.3 sec;   INR: 1.03          PTT - ( 12 Nov 2021 17:28 )  PTT:28.5 sec      RADIOLOGY & ADDITIONAL TESTS:  < from: MR Head No Cont (11.13.21 @ 18:51) >  Acute ischemic insults are identified along the anterior margin of the superior right cerebellar hemisphere and involving periventricular white matter along the margins of the occipital horn of left lateral ventricle as detailed above.    < end of copied text >    51y Female with PMHx of vertigo initially presented to [facility] for dizziness, right hand dysmetria and right leg weakness, LKW 11/12 10:30AM. At [facility], CTH without hemorrhage. CTA with no steno-occlusive disease. CTP with prolonged Tmax in the right temporal lobe and right cerebellar lobe thought to be artifact per report. Patient out of tpa window. She was transferred to [facility] for stroke work up.     Neuro  #CVA  - c/w with asa 81mg daily and plavix 75 daily  - atorvastatin 40mg   - q4hr stroke neuro checks and vitals  - MRI Brain without contrast- acute right superior cerebellar and acute left occipital horn CVA  - MRA neck r/o dissection - done, pending read  - hypercoagulable labs sent   - consider TTE and LITO if MRA negative for dissection to r/o cardioembolic source  -  ESR 11  - HCT Results: negative  - CTA Results: no steno-occlusive disease, per admitting attending, right vertebral artery appears smaller and terminates in PICA  - Stroke education provided    Cards  - permissive hypertension, Goal -180   - obtain TTE with bubble  - Stroke Code EKG Results: NSR  - LDL results: 138    Pulm  - call provider if SPO2 < 94%    GI  #Nutrition/Fluids/Electrolytes   - replete K<4 and Mg <2  - Diet: Regular  - IVF: none    Infectious Disease  - Stroke Code CXR results: No acute abnormality visualized.    Endocrine  - A1C results: 5.3  - TSH results: 1.9    DVT Prophylaxis  - lovenox sq for DVT prophylaxis   - SCDs for DVT prophylaxis     PT/OT/SLP - outpatient therapy    Discussed with Neurology Attending Dr. Tapia   Neurology Stroke Progress Note    INTERVAL HPI/OVERNIGHT EVENTS:  Patient seen and examined. She states she does have to work harder to enunciate, and when she's tired her words are more slurred. She feels like the right side is still a little weaker than the left. She denies new numbness, vision changes, or word finding difficulty. Denies CP, SOB, or abdominal pain.    MEDICATIONS  (STANDING):  aspirin  chewable 81 milliGRAM(s) Oral daily  atorvastatin 40 milliGRAM(s) Oral at bedtime  clopidogrel Tablet 75 milliGRAM(s) Oral daily  enoxaparin Injectable 40 milliGRAM(s) SubCutaneous daily  melatonin 5 milliGRAM(s) Oral at bedtime    MEDICATIONS  (PRN):  acetaminophen     Tablet .. 650 milliGRAM(s) Oral every 6 hours PRN Moderate Pain (4 - 6)  ondansetron Injectable 4 milliGRAM(s) IV Push every 6 hours PRN Nausea and/or Vomiting      Allergies    sulfa drugs (Unknown)    Intolerances        Vital Signs Last 24 Hrs  T(C): 37.1 (14 Nov 2021 06:30), Max: 37.2 (14 Nov 2021 01:08)  T(F): 98.7 (14 Nov 2021 06:30), Max: 98.9 (14 Nov 2021 01:08)  HR: 62 (14 Nov 2021 08:25) (50 - 78)  BP: 138/81 (14 Nov 2021 08:25) (127/83 - 167/91)  BP(mean): 102 (14 Nov 2021 08:25) (101 - 117)  RR: 18 (14 Nov 2021 08:25) (16 - 18)  SpO2: 98% (14 Nov 2021 08:25) (95% - 98%)    Physical exam:  General: No acute distress, awake and alert  Eyes: Anicteric sclerae, moist conjunctivae, see below for CNs  Neck: trachea midline, FROM, supple, no thyromegaly or lymphadenopathy  Cardiovascular: Regular rate and rhythm, no murmurs, rubs, or gallops. No carotid bruits.   Pulmonary: Anterior breath sounds clear bilaterally, no crackles or wheezing. No use of accessory muscles  GI: Abdomen soft, non-distended, non-tender  Extremities: Radial and DP pulses +2, no edema    Neurologic:  -Mental status: Awake, alert, oriented to person, place, and time. Speech is fluent with intact naming, repetition, and comprehension, +mild dysarthria. Recent and remote memory intact. Follows commands. Attention/concentration intact. Fund of knowledge appropriate.  -Cranial nerves:   II: Visual fields are full to confrontation.  III, IV, VI: Extraocular movements are intact without nystagmus. Pupils equally round and reactive to light  V:  Facial sensation V1-V3 equal and intact   VII: Face is symmetric   Motor: Normal bulk and tone. No pronator drift. Strength bilateral upper extremity 5/5, bilateral lower extremities 5/5.  Rapid alternating movements intact and symmetric  Sensation: Intact to light touch bilaterally. No neglect or extinction on double simultaneous testing.  Coordination: +dysmetria on right finger-to-nose and slight dysmetria of right heel-to-shin. No dysmetria of left side      LABS:                        13.4   9.59  )-----------( 338      ( 14 Nov 2021 08:38 )             42.5     11-14    140  |  106  |  17  ----------------------------<  107<H>  3.8   |  24  |  0.97    Ca    8.9      14 Nov 2021 08:38  Phos  3.8     11-14  Mg     2.4     11-14    TPro  7.0  /  Alb  3.7  /  TBili  0.3  /  DBili  x   /  AST  14<L>  /  ALT  13  /  AlkPhos  44  11-12    PT/INR - ( 12 Nov 2021 17:28 )   PT: 12.3 sec;   INR: 1.03          PTT - ( 12 Nov 2021 17:28 )  PTT:28.5 sec      RADIOLOGY & ADDITIONAL TESTS:  < from: MR Head No Cont (11.13.21 @ 18:51) >  Acute ischemic insults are identified along the anterior margin of the superior right cerebellar hemisphere and involving periventricular white matter along the margins of the occipital horn of left lateral ventricle as detailed above.    < end of copied text >    51y Female with PMHx of vertigo initially presented to [facility] for dizziness, right hand dysmetria and right leg weakness, LKW 11/12 10:30AM. At [facility], CTH without hemorrhage. CTA with no steno-occlusive disease. CTP with prolonged Tmax in the right temporal lobe and right cerebellar lobe thought to be artifact per report. Patient out of tpa window. She was transferred to [facility] for stroke work up.     Neuro  #CVA  - c/w with asa 81mg daily and plavix 75 daily  - atorvastatin 40mg   - q4hr stroke neuro checks and vitals  - MRI Brain without contrast- acute right superior cerebellar and acute left occipital horn CVA  - MRA neck -no dissection  - hypercoagulable labs sent   - TTE and LITO to r/o cardioembolic source  -  ESR 11  - HCT Results: negative  - CTA Results: no steno-occlusive disease, per admitting attending, right vertebral artery appears smaller and terminates in PICA  - Stroke education provided    Cards  - permissive hypertension, Goal -180   - obtain TTE with bubble  - Stroke Code EKG Results: NSR  - LDL results: 138    Pulm  - call provider if SPO2 < 94%    GI  #Nutrition/Fluids/Electrolytes   - replete K<4 and Mg <2  - Diet: Regular  - IVF: none    Infectious Disease  - Stroke Code CXR results: No acute abnormality visualized.    Endocrine  - A1C results: 5.3  - TSH results: 1.9    DVT Prophylaxis  - lovenox sq for DVT prophylaxis   - SCDs for DVT prophylaxis     PT/OT/SLP - outpatient therapy    Discussed with Neurology Attending Dr. Tapia

## 2021-11-15 ENCOUNTER — TRANSCRIPTION ENCOUNTER (OUTPATIENT)
Age: 51
End: 2021-11-15

## 2021-11-15 LAB
ANION GAP SERPL CALC-SCNC: 11 MMOL/L — SIGNIFICANT CHANGE UP (ref 5–17)
AT III ACT/NOR PPP CHRO: 96 % — SIGNIFICANT CHANGE UP (ref 85–135)
B2 GLYCOPROT1 AB SER QL: NEGATIVE — SIGNIFICANT CHANGE UP
BUN SERPL-MCNC: 15 MG/DL — SIGNIFICANT CHANGE UP (ref 7–23)
CALCIUM SERPL-MCNC: 9.1 MG/DL — SIGNIFICANT CHANGE UP (ref 8.4–10.5)
CARDIOLIPIN AB SER-ACNC: NEGATIVE — SIGNIFICANT CHANGE UP
CHLORIDE SERPL-SCNC: 102 MMOL/L — SIGNIFICANT CHANGE UP (ref 96–108)
CO2 SERPL-SCNC: 25 MMOL/L — SIGNIFICANT CHANGE UP (ref 22–31)
CREAT SERPL-MCNC: 0.95 MG/DL — SIGNIFICANT CHANGE UP (ref 0.5–1.3)
GLUCOSE SERPL-MCNC: 104 MG/DL — HIGH (ref 70–99)
HCT VFR BLD CALC: 42.5 % — SIGNIFICANT CHANGE UP (ref 34.5–45)
HGB BLD-MCNC: 14 G/DL — SIGNIFICANT CHANGE UP (ref 11.5–15.5)
MAGNESIUM SERPL-MCNC: 2.2 MG/DL — SIGNIFICANT CHANGE UP (ref 1.6–2.6)
MCHC RBC-ENTMCNC: 29.5 PG — SIGNIFICANT CHANGE UP (ref 27–34)
MCHC RBC-ENTMCNC: 32.9 GM/DL — SIGNIFICANT CHANGE UP (ref 32–36)
MCV RBC AUTO: 89.5 FL — SIGNIFICANT CHANGE UP (ref 80–100)
NRBC # BLD: 0 /100 WBCS — SIGNIFICANT CHANGE UP (ref 0–0)
PHOSPHATE SERPL-MCNC: 3.9 MG/DL — SIGNIFICANT CHANGE UP (ref 2.5–4.5)
PLATELET # BLD AUTO: 383 K/UL — SIGNIFICANT CHANGE UP (ref 150–400)
POTASSIUM SERPL-MCNC: 3.9 MMOL/L — SIGNIFICANT CHANGE UP (ref 3.5–5.3)
POTASSIUM SERPL-SCNC: 3.9 MMOL/L — SIGNIFICANT CHANGE UP (ref 3.5–5.3)
PROT C ACT/NOR PPP: 126 % — SIGNIFICANT CHANGE UP (ref 74–150)
RBC # BLD: 4.75 M/UL — SIGNIFICANT CHANGE UP (ref 3.8–5.2)
RBC # FLD: 12.7 % — SIGNIFICANT CHANGE UP (ref 10.3–14.5)
SODIUM SERPL-SCNC: 138 MMOL/L — SIGNIFICANT CHANGE UP (ref 135–145)
WBC # BLD: 10.08 K/UL — SIGNIFICANT CHANGE UP (ref 3.8–10.5)
WBC # FLD AUTO: 10.08 K/UL — SIGNIFICANT CHANGE UP (ref 3.8–10.5)

## 2021-11-15 PROCEDURE — 99233 SBSQ HOSP IP/OBS HIGH 50: CPT

## 2021-11-15 PROCEDURE — 93306 TTE W/DOPPLER COMPLETE: CPT | Mod: 26

## 2021-11-15 PROCEDURE — 93312 ECHO TRANSESOPHAGEAL: CPT | Mod: 26

## 2021-11-15 RX ORDER — CLOPIDOGREL BISULFATE 75 MG/1
1 TABLET, FILM COATED ORAL
Qty: 30 | Refills: 0
Start: 2021-11-15 | End: 2021-12-14

## 2021-11-15 RX ORDER — ATORVASTATIN CALCIUM 80 MG/1
1 TABLET, FILM COATED ORAL
Qty: 30 | Refills: 0
Start: 2021-11-15 | End: 2021-12-14

## 2021-11-15 RX ORDER — ASPIRIN/CALCIUM CARB/MAGNESIUM 324 MG
1 TABLET ORAL
Qty: 0 | Refills: 0 | DISCHARGE
Start: 2021-11-15

## 2021-11-15 RX ADMIN — CLOPIDOGREL BISULFATE 75 MILLIGRAM(S): 75 TABLET, FILM COATED ORAL at 13:25

## 2021-11-15 RX ADMIN — Medication 81 MILLIGRAM(S): at 13:25

## 2021-11-15 RX ADMIN — Medication 650 MILLIGRAM(S): at 22:30

## 2021-11-15 RX ADMIN — ATORVASTATIN CALCIUM 40 MILLIGRAM(S): 80 TABLET, FILM COATED ORAL at 22:20

## 2021-11-15 RX ADMIN — ENOXAPARIN SODIUM 40 MILLIGRAM(S): 100 INJECTION SUBCUTANEOUS at 13:25

## 2021-11-15 RX ADMIN — Medication 5 MILLIGRAM(S): at 22:00

## 2021-11-15 RX ADMIN — Medication 650 MILLIGRAM(S): at 22:00

## 2021-11-15 NOTE — PROGRESS NOTE ADULT - TIME BILLING
review of patient information including recent vital signs, labs, imaging, and notes; assessing, examining patient; updating patient/family; discussion and coordination of care with multidisciplinary team

## 2021-11-15 NOTE — DISCHARGE NOTE PROVIDER - NSFOLLOWUPCLINICS_GEN_ALL_ED_FT
[facility]  Family Medicine  [address]  [place], [place] [address]  Phone: [phone]  Fax:   Follow Up Time: 1 month

## 2021-11-15 NOTE — CONSULT NOTE ADULT - SUBJECTIVE AND OBJECTIVE BOX
EP consult called at around 3:30 PM for loop recorder implant in patient with acute CVA (cardio-embolic work up is progress). Unable to interview and consent patient today as per is undergoing  LITO/sedated.  Will see patient tomorrow, full consult note to follow in am  possible add on for loop recorder for 11/16 (no need to be NPO for loop recorder implant)

## 2021-11-15 NOTE — PROGRESS NOTE ADULT - ATTENDING COMMENTS
The patient is a very pleasant 51-year-old female admitted 11/12 after presenting with sudden onset vertigo, right-sided dysmetria, and dysarthria.  MRI confirmed an acute right superior cerebellum/peduncle and left occipital stroke.  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 Hypercoag studies are pending.  Plan is for LITO today and ILR placement.  For now, we will continue DAPT, statin ().

## 2021-11-15 NOTE — PROGRESS NOTE ADULT - SUBJECTIVE AND OBJECTIVE BOX
Neurology Stroke Progress Note    INTERVAL HPI/OVERNIGHT EVENTS:  Patient seen and examined. Still has slurred speech, and does notices some subtle difficulties using the right hand to sign papers, fine motor movements. She is right hand dominant. Denies any new vision changes, nausea, vomiting, or numbness.     MEDICATIONS  (STANDING):  aspirin  chewable 81 milliGRAM(s) Oral daily  atorvastatin 40 milliGRAM(s) Oral at bedtime  clopidogrel Tablet 75 milliGRAM(s) Oral daily  enoxaparin Injectable 40 milliGRAM(s) SubCutaneous daily  melatonin 5 milliGRAM(s) Oral at bedtime    MEDICATIONS  (PRN):  acetaminophen     Tablet .. 650 milliGRAM(s) Oral every 6 hours PRN Moderate Pain (4 - 6)  ondansetron Injectable 4 milliGRAM(s) IV Push every 6 hours PRN Nausea and/or Vomiting      Allergies    sulfa drugs (Unknown)    Intolerances        Vital Signs Last 24 Hrs  T(C): 36.9 (15 Nov 2021 13:43), Max: 37.3 (15 Nov 2021 05:28)  T(F): 98.4 (15 Nov 2021 13:43), Max: 99.2 (15 Nov 2021 05:28)  HR: 54 (15 Nov 2021 13:32) (54 - 66)  BP: 149/89 (15 Nov 2021 13:32) (118/79 - 149/89)  BP(mean): 113 (15 Nov 2021 13:32) (94 - 114)  RR: 17 (15 Nov 2021 13:32) (17 - 19)  SpO2: 94% (15 Nov 2021 13:32) (94% - 99%)    Physical exam:  General: No acute distress, awake and alert  Eyes: Anicteric sclerae, moist conjunctivae, see below for CNs  Neck: trachea midline, FROM, supple, no thyromegaly or lymphadenopathy  Cardiovascular: Regular rate and rhythm, no murmurs, rubs, or gallops. No carotid bruits.   Pulmonary: Anterior breath sounds clear bilaterally, no crackles or wheezing. No use of accessory muscles  GI: Abdomen soft, non-distended, non-tender  Extremities: Radial and DP pulses +2, no edema    Neurologic:  -Mental status: Awake, alert, oriented to person, place, and time. Speech is fluent with intact naming, repetition, and comprehension, +mild dysarthria. Recent and remote memory intact. Follows commands. Attention/concentration intact. Fund of knowledge appropriate.  -Cranial nerves:   II: Visual fields are full to confrontation.  III, IV, VI: Extraocular movements are intact without nystagmus. Pupils equally round and reactive to light  V:  Facial sensation V1-V3 equal and intact   VII: Face is symmetric   Motor: Normal bulk and tone. No pronator drift. Strength bilateral upper extremity 5/5, bilateral lower extremities 5/5.  Rapid alternating movements intact and symmetric  Sensation: Intact to light touch bilaterally. No neglect or extinction on double simultaneous testing.  Coordination: +dysmetria on right finger-to-nose and slight dysmetria of right heel-to-shin. No dysmetria of left side  LABS:                        14.0   10.08 )-----------( 383      ( 15 Nov 2021 06:20 )             42.5     11-15    138  |  102  |  15  ----------------------------<  104<H>  3.9   |  25  |  0.95    Ca    9.1      15 Nov 2021 06:20  Phos  3.9     11-15  Mg     2.2     11-15            RADIOLOGY & ADDITIONAL TESTS:     Neurology Stroke Progress Note    INTERVAL HPI/OVERNIGHT EVENTS:  Patient seen and examined. Still has slurred speech, and does notices some subtle difficulties using the right hand to sign papers, fine motor movements. She is right hand dominant. Denies any new vision changes, nausea, vomiting, or numbness. Went for LITO today.     MEDICATIONS  (STANDING):  aspirin  chewable 81 milliGRAM(s) Oral daily  atorvastatin 40 milliGRAM(s) Oral at bedtime  clopidogrel Tablet 75 milliGRAM(s) Oral daily  enoxaparin Injectable 40 milliGRAM(s) SubCutaneous daily  melatonin 5 milliGRAM(s) Oral at bedtime    MEDICATIONS  (PRN):  acetaminophen     Tablet .. 650 milliGRAM(s) Oral every 6 hours PRN Moderate Pain (4 - 6)  ondansetron Injectable 4 milliGRAM(s) IV Push every 6 hours PRN Nausea and/or Vomiting      Allergies    sulfa drugs (Unknown)    Intolerances        Vital Signs Last 24 Hrs  T(C): 36.9 (15 Nov 2021 13:43), Max: 37.3 (15 Nov 2021 05:28)  T(F): 98.4 (15 Nov 2021 13:43), Max: 99.2 (15 Nov 2021 05:28)  HR: 54 (15 Nov 2021 13:32) (54 - 66)  BP: 149/89 (15 Nov 2021 13:32) (118/79 - 149/89)  BP(mean): 113 (15 Nov 2021 13:32) (94 - 114)  RR: 17 (15 Nov 2021 13:32) (17 - 19)  SpO2: 94% (15 Nov 2021 13:32) (94% - 99%)    Physical exam:  General: No acute distress, awake and alert  Eyes: Anicteric sclerae, moist conjunctivae, see below for CNs  Neck: trachea midline, FROM, supple, no thyromegaly or lymphadenopathy  Cardiovascular: Regular rate and rhythm, no murmurs, rubs, or gallops. No carotid bruits.   Pulmonary: Anterior breath sounds clear bilaterally, no crackles or wheezing. No use of accessory muscles  GI: Abdomen soft, non-distended, non-tender  Extremities: Radial and DP pulses +2, no edema    Neurologic:  -Mental status: Awake, alert, oriented to person, place, and time. Speech is fluent with intact naming, repetition, and comprehension, +mild dysarthria. Recent and remote memory intact. Follows commands. Attention/concentration intact. Fund of knowledge appropriate.  -Cranial nerves:   II: Visual fields are full to confrontation.  III, IV, VI: Extraocular movements are intact without nystagmus. Pupils equally round and reactive to light  V:  Facial sensation V1-V3 equal and intact   VII: Face is symmetric   Motor: Normal bulk and tone. No pronator drift. Strength bilateral upper extremity 5/5, bilateral lower extremities 5/5.  Rapid alternating movements intact and symmetric  Sensation: Intact to light touch bilaterally. No neglect or extinction on double simultaneous testing.  Coordination: +dysmetria on right finger-to-nose and slight dysmetria of right heel-to-shin. No dysmetria of left side  LABS:                        14.0   10.08 )-----------( 383      ( 15 Nov 2021 06:20 )             42.5     11-15    138  |  102  |  15  ----------------------------<  104<H>  3.9   |  25  |  0.95    Ca    9.1      15 Nov 2021 06:20  Phos  3.9     11-15  Mg     2.2     11-15            RADIOLOGY & ADDITIONAL TESTS:

## 2021-11-15 NOTE — DISCHARGE NOTE PROVIDER - NSDCMRMEDTOKEN_GEN_ALL_CORE_FT
aspirin 81 mg oral tablet, chewable: 1 tab(s) orally once a day   aspirin 81 mg oral tablet, chewable: 1 tab(s) orally once a day  atorvastatin 40 mg oral tablet: 1 tab(s) orally once a day (at bedtime)  clopidogrel 75 mg oral tablet: 1 tab(s) orally once a day

## 2021-11-15 NOTE — PROGRESS NOTE ADULT - ASSESSMENT
51y Female with PMHx of vertigo initially presented to [facility] for dizziness, right hand dysmetria and right leg weakness, LKW 11/12 10:30AM. At [facility], CTH without hemorrhage. CTA with no steno-occlusive disease. CTP with prolonged Tmax in the right temporal lobe and right cerebellar lobe thought to be artifact per report. Patient out of tpa window. She was transferred to [facility] for stroke work up.     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    Endocrine  - A1C results: 5.3  - TSH results: 1.9    DVT Prophylaxis  - lovenox sq for DVT prophylaxis   - SCDs for DVT prophylaxis     PT/OT/SLP - outpatient therapy    Discussed with Neurology Attending Dr. Alarcon   51y Female with PMHx of vertigo initially presented to [facility] for dizziness, right hand dysmetria and right leg weakness, LKW 11/12 10:30AM. At [facility], CTH without hemorrhage. CTA with no steno-occlusive disease. CTP with prolonged Tmax in the right temporal lobe and right cerebellar lobe thought to be artifact per report. Patient out of tpa window. She was transferred to [facility] for stroke work up.     Neuro  #CVA  - c/w with asa 81mg daily and plavix 75 daily  - atorvastatin 40mg   - q4hr stroke neuro checks and vitals  - MRI Brain without contrast- acute right superior cerebellar and acute left occipital horn CVA  - MRA neck -no dissection  - hypercoagulable labs sent   - LITO done, + for PFO, no thrombus identified, EF 65%   - LE Dopplers ordered  - EP consulted for loop recorder  -  ESR 11  - HCT Results: negative  - CTA Results: no steno-occlusive disease, per admitting attending, right vertebral artery appears smaller and terminates in PICA  - reviewed CTA with neuroradiology attending, 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    Endocrine  - A1C results: 5.3  - TSH results: 1.9    DVT Prophylaxis  - lovenox sq for DVT prophylaxis   - SCDs for DVT prophylaxis     PT/OT/SLP - outpatient therapy    Discussed with Neurology Attending Dr. Alarcon

## 2021-11-15 NOTE — DISCHARGE NOTE PROVIDER - PROVIDER TOKENS
PROVIDER:[TOKEN:[[id number] PROVIDER:[TOKEN:[[id number]],SCHEDULEDAPPT:[12/01/2021],SCHEDULEDAPPTTIME:[11:20 AM]],PROVIDER:[TOKEN:[[id number]],FOLLOWUP:[2 weeks]]

## 2021-11-15 NOTE — DISCHARGE NOTE PROVIDER - NSDCCPCAREPLAN_GEN_ALL_CORE_FT
PRINCIPAL DISCHARGE DIAGNOSIS  Diagnosis: Ischemic stroke  Assessment and Plan of Treatment: During this hospital admission, you had an ischemic stroke. During an ischemic stroke, blood stops flowing to part of your brain because of a blockage in the blood vessel. This can damage areas in the brain that control other parts of the body.  Please take your aspirin and plavix for blood thinning and Atorvastatin for cholesterol medication/blood vessel protection as prescribed to prevent further strokes. Do not skip doses and do not run low on your medication. If you run low on your medication, please contact your doctor.  You will follow up outpatient with the stroke Nurse Practitioner as scheduled below.  Doing your regular tasks may be difficult after you've had a stroke, but you can learn new ways to manage your daily activities. In fact, doing daily activities may help you to regain muscle strength. Be patient, give yourself time to adjust, and appreciate the progress you make. For example, when showering or bathing, test the water temperature with a hand or foot that was not affected by the stroke, use grab bars, a shower seat, a hand-held showerhead, etc. It is normal to feel fatigue after a stroke, while some days may be worse than others, you will continue to improve.  Call 911 right away if you have any of the following symptoms of another stroke:  B: Balance: Sudden: Dizziness, loss of balance, or a sense of falling, difficulty with coordinating movement  E: Eyes: Sudden double vision or trouble seeing in one or both eyes  F: Face: Sudden uneven face  A: Arms (Legs): Sudden weakness, tingling, or loss of feeling on one side of your face or body  S: Speech: Sudden trouble talking or slurred speech, sudden difficulty understanding others  T: Time: Please call 911 right away and go to the emergency room  •Sudden, severe headache  •Blackouts or seizures      SECONDARY DISCHARGE DIAGNOSES  Diagnosis: Dizziness  Assessment and Plan of Treatment: You presented to the ED for dizziness. Your dizziness was secondary to an acute ischemic stroke. Please refer to the instructions above related to your stroke.

## 2021-11-15 NOTE — DISCHARGE NOTE PROVIDER - CARE PROVIDER_API CALL
Elin Alarcon)  Neurology  [address] [address]  Phone: [phone]  Fax: [phone]  Follow Up Time:    Elin Alarcon)  Neurology  [address] [address]  Phone: [phone]  Fax: [phone]  Scheduled Appointment: 12/01/2021 11:20 AM    Hai Diaz)  Cardiac Electrophysiology  [address], [address] [place] [address]  Phone: [phone]  Fax: [phone]  Follow Up Time: 2 weeks

## 2021-11-16 ENCOUNTER — TRANSCRIPTION ENCOUNTER (OUTPATIENT)
Age: 51
End: 2021-11-16

## 2021-11-16 VITALS — TEMPERATURE: 99 F

## 2021-11-16 DIAGNOSIS — R29.898 OTHER SYMPTOMS AND SIGNS INVOLVING THE MUSCULOSKELETAL SYSTEM: ICD-10-CM

## 2021-11-16 DIAGNOSIS — Q21.1 ATRIAL SEPTAL DEFECT: ICD-10-CM

## 2021-11-16 DIAGNOSIS — I63.9 CEREBRAL INFARCTION, UNSPECIFIED: ICD-10-CM

## 2021-11-16 PROBLEM — R42 DIZZINESS AND GIDDINESS: Chronic | Status: ACTIVE | Noted: 2021-11-12

## 2021-11-16 LAB — APCR PPP: 2.9 RATIO — SIGNIFICANT CHANGE UP

## 2021-11-16 PROCEDURE — 99285 EMERGENCY DEPT VISIT HI MDM: CPT | Mod: 25

## 2021-11-16 PROCEDURE — 85300 ANTITHROMBIN III ACTIVITY: CPT

## 2021-11-16 PROCEDURE — 84443 ASSAY THYROID STIM HORMONE: CPT

## 2021-11-16 PROCEDURE — 80061 LIPID PANEL: CPT

## 2021-11-16 PROCEDURE — 86147 CARDIOLIPIN ANTIBODY EA IG: CPT

## 2021-11-16 PROCEDURE — 70551 MRI BRAIN STEM W/O DYE: CPT

## 2021-11-16 PROCEDURE — C1764: CPT

## 2021-11-16 PROCEDURE — 70450 CT HEAD/BRAIN W/O DYE: CPT

## 2021-11-16 PROCEDURE — 83735 ASSAY OF MAGNESIUM: CPT

## 2021-11-16 PROCEDURE — 85306 CLOT INHIBIT PROT S FREE: CPT

## 2021-11-16 PROCEDURE — 97161 PT EVAL LOW COMPLEX 20 MIN: CPT

## 2021-11-16 PROCEDURE — 80048 BASIC METABOLIC PNL TOTAL CA: CPT

## 2021-11-16 PROCEDURE — 83036 HEMOGLOBIN GLYCOSYLATED A1C: CPT

## 2021-11-16 PROCEDURE — 86146 BETA-2 GLYCOPROTEIN ANTIBODY: CPT

## 2021-11-16 PROCEDURE — 71045 X-RAY EXAM CHEST 1 VIEW: CPT

## 2021-11-16 PROCEDURE — 99221 1ST HOSP IP/OBS SF/LOW 40: CPT

## 2021-11-16 PROCEDURE — 96374 THER/PROPH/DIAG INJ IV PUSH: CPT

## 2021-11-16 PROCEDURE — 0042T: CPT

## 2021-11-16 PROCEDURE — 85307 ASSAY ACTIVATED PROTEIN C: CPT

## 2021-11-16 PROCEDURE — 84100 ASSAY OF PHOSPHORUS: CPT

## 2021-11-16 PROCEDURE — 97116 GAIT TRAINING THERAPY: CPT

## 2021-11-16 PROCEDURE — 99239 HOSP IP/OBS DSCHRG MGMT >30: CPT

## 2021-11-16 PROCEDURE — 87635 SARS-COV-2 COVID-19 AMP PRB: CPT

## 2021-11-16 PROCEDURE — 85027 COMPLETE CBC AUTOMATED: CPT

## 2021-11-16 PROCEDURE — 33285 INSJ SUBQ CAR RHYTHM MNTR: CPT

## 2021-11-16 PROCEDURE — 93306 TTE W/DOPPLER COMPLETE: CPT

## 2021-11-16 PROCEDURE — 93970 EXTREMITY STUDY: CPT

## 2021-11-16 PROCEDURE — 85303 CLOT INHIBIT PROT C ACTIVITY: CPT

## 2021-11-16 PROCEDURE — 86769 SARS-COV-2 COVID-19 ANTIBODY: CPT

## 2021-11-16 PROCEDURE — 80053 COMPREHEN METABOLIC PANEL: CPT

## 2021-11-16 PROCEDURE — 85025 COMPLETE CBC W/AUTO DIFF WBC: CPT

## 2021-11-16 PROCEDURE — 93970 EXTREMITY STUDY: CPT | Mod: 26

## 2021-11-16 PROCEDURE — 93312 ECHO TRANSESOPHAGEAL: CPT

## 2021-11-16 PROCEDURE — 85598 HEXAGNAL PHOSPH PLTLT NEUTRL: CPT

## 2021-11-16 PROCEDURE — 93005 ELECTROCARDIOGRAM TRACING: CPT

## 2021-11-16 PROCEDURE — 84484 ASSAY OF TROPONIN QUANT: CPT

## 2021-11-16 PROCEDURE — 70547 MR ANGIOGRAPHY NECK W/O DYE: CPT

## 2021-11-16 PROCEDURE — 36415 COLL VENOUS BLD VENIPUNCTURE: CPT

## 2021-11-16 PROCEDURE — 86140 C-REACTIVE PROTEIN: CPT

## 2021-11-16 PROCEDURE — 85730 THROMBOPLASTIN TIME PARTIAL: CPT

## 2021-11-16 PROCEDURE — 85652 RBC SED RATE AUTOMATED: CPT

## 2021-11-16 PROCEDURE — 70498 CT ANGIOGRAPHY NECK: CPT

## 2021-11-16 PROCEDURE — 82962 GLUCOSE BLOOD TEST: CPT

## 2021-11-16 PROCEDURE — 97162 PT EVAL MOD COMPLEX 30 MIN: CPT

## 2021-11-16 PROCEDURE — 84702 CHORIONIC GONADOTROPIN TEST: CPT

## 2021-11-16 PROCEDURE — 92523 SPEECH SOUND LANG COMPREHEN: CPT

## 2021-11-16 PROCEDURE — 83090 ASSAY OF HOMOCYSTEINE: CPT

## 2021-11-16 PROCEDURE — 70496 CT ANGIOGRAPHY HEAD: CPT

## 2021-11-16 PROCEDURE — 80307 DRUG TEST PRSMV CHEM ANLYZR: CPT

## 2021-11-16 PROCEDURE — 85610 PROTHROMBIN TIME: CPT

## 2021-11-16 RX ORDER — ATORVASTATIN CALCIUM 80 MG/1
1 TABLET, FILM COATED ORAL
Qty: 30 | Refills: 0
Start: 2021-11-16 | End: 2021-12-15

## 2021-11-16 RX ORDER — CLOPIDOGREL BISULFATE 75 MG/1
1 TABLET, FILM COATED ORAL
Qty: 30 | Refills: 0
Start: 2021-11-16 | End: 2021-12-15

## 2021-11-16 RX ADMIN — CLOPIDOGREL BISULFATE 75 MILLIGRAM(S): 75 TABLET, FILM COATED ORAL at 14:03

## 2021-11-16 RX ADMIN — ENOXAPARIN SODIUM 40 MILLIGRAM(S): 100 INJECTION SUBCUTANEOUS at 14:04

## 2021-11-16 RX ADMIN — Medication 81 MILLIGRAM(S): at 14:04

## 2021-11-16 NOTE — DISCHARGE NOTE NURSING/CASE MANAGEMENT/SOCIAL WORK - PATIENT PORTAL LINK FT
You can access the FollowMyHealth Patient Portal offered by [facility] by registering at the following website: [email or web address]. By joining "[employer]"’s FollowMyHealth portal, you will also be able to view your health information using other applications (apps) compatible with our system.

## 2021-11-16 NOTE — PROGRESS NOTE ADULT - SUBJECTIVE AND OBJECTIVE BOX
Physical Medicine and Rehabilitation Progress Note:    Patient is a 51y old  Female who presents with a chief complaint of dizziness, R dysmetria (16 Nov 2021 14:29)      HPI:   **STROKE HPI***    HPI: 51y Female with PMHx of vertigo initially presented to [facility] for dizziness, right hand dysmetria and right leg weakness. Patient woke up 11/12 10:30AM in [facility]. 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 She tried to continue with her call but noticed that she had some word finding difficulties, slurred speech and difficulty navigating the computer with her right hand (right hand dominant). Her dizziness is much more severe compared to her prior vertigo episodes. Her dizziness does improve with her eyes closed and turning onto her right side. At [facility], CTH without hemorrhage. CTA with no steno-occlusive disease. CTP with prolonged Tmax in the right temporal lob and right cerebellar lobe thought to be artifact. Patient was loaded with ASA 325mg and transferred to [facility] for stroke work up.     At bedside, patient's symptoms have not improved. Her dizziness is described as the room spinning. Her right hand dysmetria is still present and when attempting to walk at [facility], she had to hold onto the wall to support herself as she felt that her right leg was dragging. She denies CP, SOB, changes in vision, decrease sensation/numbness along her extremities. She last saw her PCP 1 year ago. Her mother has 2 miscarriages and provoked b/l LE DVTs (secondary to b/l leg fractures). Patient has never been pregnant and denies any family hx of blood disorders.        (12 Nov 2021 22:20)                            14.0   10.08 )-----------( 383      ( 15 Nov 2021 06:20 )             42.5       11-15    138  |  102  |  15  ----------------------------<  104<H>  3.9   |  25  |  0.95    Ca    9.1      15 Nov 2021 06:20  Phos  3.9     11-15  Mg     2.2     11-15      Vital Signs Last 24 Hrs  T(C): 36.7 (16 Nov 2021 14:44), Max: 37.1 (16 Nov 2021 01:04)  T(F): 98.1 (16 Nov 2021 14:44), Max: 98.8 (16 Nov 2021 01:04)  HR: 68 (16 Nov 2021 14:09) (52 - 68)  BP: 135/74 (16 Nov 2021 14:09) (115/74 - 144/86)  BP(mean): 96 (16 Nov 2021 14:09) (89 - 108)  RR: 18 (16 Nov 2021 14:09) (14 - 19)  SpO2: 99% (16 Nov 2021 14:18) (95% - 99%)    MEDICATIONS  (STANDING):  aspirin  chewable 81 milliGRAM(s) Oral daily  atorvastatin 40 milliGRAM(s) Oral at bedtime  clopidogrel Tablet 75 milliGRAM(s) Oral daily  enoxaparin Injectable 40 milliGRAM(s) SubCutaneous daily  melatonin 5 milliGRAM(s) Oral at bedtime    MEDICATIONS  (PRN):  acetaminophen     Tablet .. 650 milliGRAM(s) Oral every 6 hours PRN Moderate Pain (4 - 6)  ondansetron Injectable 4 milliGRAM(s) IV Push every 6 hours PRN Nausea and/or Vomiting    Currently Undergoing Physical/ Occupational Therapy at bedside.    Functional Status Assessment:   11/15/2021        Cognitive/Perceptual/Neuro  Cognitive/Neuro/Behavioral [WDL Definition: Alert; opens eyes spontaneously; arouses to voice or touch; oriented x 4; follows commands; speech spontaneous, logical; purposeful motor response; behavior appropriate to situation]: WDL  Level of Consciousness: alert  Orientation: oriented x 4  Speech: slurred;  mildly  Mood/Behavior: calm;  cooperative;  behavior appropriate to situation    Language Assistance  Preferred Language to Address Healthcare Preferred Language to Address Healthcare: English    Therapeutic Interventions      Bed Mobility  Bed Mobility Training Rehab Potential: good, to achieve stated therapy goals  Bed Mobility Training Rolling/Turning: independent  Bed Mobility Training Scooting: independent  Bed Mobility Training Bridging: independent  Bed Mobility Training Sit-to-Supine: independent  Bed Mobility Training Supine-to-Sit: independent    Sit-Stand Transfer Training  Sit-to-Stand Transfer Training Rehab Potential: good, to achieve stated therapy goals  Transfer Training Sit-to-Stand Transfer: independent  Transfer Training Stand-to-Sit Transfer: independent  Sit-to-Stand Transfer Training Transfer Safety Analysis: decreased weight-shifting ability    Gait Training  Gait Training Rehab Potential: good, to achieve stated therapy goals  Gait Training: supervsion;  verbal cues;  weight-bearing as tolerated   straight cane;  vs no device;  150 feet;  x2  Gait Analysis: decreased marie;  minor weering Left x1 episode ~ 4 steps, self corrected   Type of Rest Type of Rest: sitting    Therapeutic Exercise  Therapeutic Exercise Rehab Effort: good  Therapeutic Exercise Detail: heel on sheen slides, target reaching bilat UEs; RLE  target reaching, letter and number writing R hand.           PM&R Impression: as above    Current Disposition Plan Recommendations:    d/c home, outpatient physical therapy

## 2021-11-16 NOTE — CONSULT NOTE ADULT - ASSESSMENT
51y F with PMHx of vertigo initially presented to [facility] for dizziness, right hand dysmetria and right leg weakness, LKW 11/12 10:30AM. At [facility], CTH without hemorrhage. CTA with no steno-occlusive disease. CTP with prolonged Tmax in the right temporal lobe and right cerebellar lobe thought to be artifact per report. Patient out of tpa window. She was transferred to [facility] for stroke work up.     Plan  -concern for dissection; f/u MRI  -f/u TTE  -c/w DAPT, statin  -BP mgmt as per primary
per Neurology    50 yo Female with PMHx of vertigo initially presented to [facility] for dizziness, right hand dysmetria and right leg weakness, LKW 11/12 10:30AM. At [facility], CTH without hemorrhage. CTA with no steno-occlusive disease. CTP with prolonged Tmax in the right temporal lobe and right cerebellar lobe thought to be artifact per report. Patient out of tpa window. She was transferred to [facility] for stroke work up.     Neuro  #CVA r/o  In discussion with Dr. Zamudio, concern for cerebellar ischemia despite CTA report as right vertebral artery appears smaller and terminates at the right PICA. As ischemia would be below the decussation of pyramids, would expect ipsilateral symptoms (spinocerebellar tracts do not decussate)  - s/p ASA 325mg load at [facility]  - c/w with asa 81mg daily and plavix 75 daily  - atorvastatin 40mg   - q4hr stroke neuro checks and vitals  - obtain MRI Brain without contrast  - obtain MRA neck r/o dissection   - consider hypercoagable w/u as pt with no pmhx risk factors if there is no dissection  -  ESR 11  - HCT Results: negative  - CTA Results: no steno-occlusive disease, per admitting attending, right vertebral artery appears smaller and terminates in PICA  - Stroke education    Cards  - permissive hypertension, Goal -180   - obtain TTE with bubble  - Stroke Code EKG Results: NSR  - LDL results: 138    Pulm  - call provider if SPO2 < 94%    GI  #Nutrition/Fluids/Electrolytes   - replete K<4 and Mg <2  - Diet: Regular  - IVF: none    Infectious Disease  - Stroke Code CXR results: No acute abnormality visualized.    Endocrine  - A1C results: 5.3  - TSH results: 1.9    DVT Prophylaxis  - lovenox sq for DVT prophylaxis   - SCDs for DVT prophylaxis 
51y Female with PMHx of vertigo initially presented to [facility] for dizziness, right hand dysmetria and right leg weakness. She was diagnosed with acute right superior cerebellar and acute left occipital horn CVA. Will plan for ILR implant before discharge.

## 2021-11-16 NOTE — PROGRESS NOTE ADULT - ASSESSMENT
per Neurology    52 yo Female with PMHx of vertigo initially presented to [facility] for dizziness, right hand dysmetria and right leg weakness, LKW 11/12 10:30AM. At [facility], CTH without hemorrhage. CTA with no steno-occlusive disease. CTP with prolonged Tmax in the right temporal lobe and right cerebellar lobe thought to be artifact per report. Patient out of tpa window. She was transferred to [facility] for stroke work up.     Neuro  #CVA  - c/w with asa 81mg daily and plavix 75 daily  - atorvastatin 40mg   - q4hr stroke neuro checks and vitals  - MRI Brain without contrast- acute right superior cerebellar and acute left occipital horn CVA  - MRA neck -no dissection  - hypercoagulable labs sent   - LITO done, + for PFO, no thrombus identified, EF 65%   - LE Dopplers ordered  - EP consulted for loop recorder  -  ESR 11  - HCT Results: negative  - CTA Results: no steno-occlusive disease, per admitting attending, right vertebral artery appears smaller and terminates in PICA  - reviewed CTA with neuroradiology attending, 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    Endocrine  - A1C results: 5.3  - TSH results: 1.9    DVT Prophylaxis  - lovenox sq for DVT prophylaxis   - SCDs for DVT prophylaxis

## 2021-11-16 NOTE — PROGRESS NOTE ADULT - PROBLEM SELECTOR PLAN 1
MRI shows  Acute ischemic insults are identified along the anterior margin of the superior right cerebellar hemisphere and involving periventricular white matter along the margins of the occipital horn of left  lateral ventricle as detailed above.    cont DAPT and statin   LITO conforms PFO  Plan for loop recorder today  Neuro recs

## 2021-11-16 NOTE — PROGRESS NOTE ADULT - SUBJECTIVE AND OBJECTIVE BOX
RUMA BURNETT  51y  Female    Patient is a 51y old  Female who presents with a chief complaint of dizziness, R dysmetria (16 Nov 2021 14:02)      HPI:   **STROKE HPI***    HPI: 51y Female with PMHx of vertigo initially presented to [facility] for dizziness, right hand dysmetria and right leg weakness. Patient woke up 11/12 10:30AM in [facility]. 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 She tried to continue with her call but noticed that she had some word finding difficulties, slurred speech and difficulty navigating the computer with her right hand (right hand dominant). Her dizziness is much more severe compared to her prior vertigo episodes. Her dizziness does improve with her eyes closed and turning onto her right side. At [facility], CTH without hemorrhage. CTA with no steno-occlusive disease. CTP with prolonged Tmax in the right temporal lob and right cerebellar lobe thought to be artifact. Patient was loaded with ASA 325mg and transferred to [facility] for stroke work up.     At bedside, patient's symptoms have not improved. Her dizziness is described as the room spinning. Her right hand dysmetria is still present and when attempting to walk at [facility], she had to hold onto the wall to support herself as she felt that her right leg was dragging. She denies CP, SOB, changes in vision, decrease sensation/numbness along her extremities. She last saw her PCP 1 year ago. Her mother has 2 miscarriages and provoked b/l LE DVTs (secondary to b/l leg fractures). Patient has never been pregnant and denies any family hx of blood disorders.        (12 Nov 2021 22:20)      PAST MEDICAL & SURGICAL HISTORY:  Vertigo        Home Medications:  aspirin 81 mg oral tablet, chewable: 1 tab(s) orally once a day (16 Nov 2021 14:18)      51y    FAMILY HISTORY:      Marital Status:  (   )    (   ) Single    (   )    (  )   Lives with: (  ) alone  (  ) children   (  ) spouse   (  ) parents  (  ) other  Recent Travel: No recent travel  Occupation:    Substance Use (street drugs): ( x ) never used  (  ) other:  Tobacco Usage:  ( x  ) never smoked   (   ) former smoker   (   ) current smoker  (     ) pack year  Alcohol Usage: None       MEDICATIONS  (STANDING):  aspirin  chewable 81 milliGRAM(s) Oral daily  atorvastatin 40 milliGRAM(s) Oral at bedtime  clopidogrel Tablet 75 milliGRAM(s) Oral daily  enoxaparin Injectable 40 milliGRAM(s) SubCutaneous daily  melatonin 5 milliGRAM(s) Oral at bedtime    MEDICATIONS  (PRN):  acetaminophen     Tablet .. 650 milliGRAM(s) Oral every 6 hours PRN Moderate Pain (4 - 6)  ondansetron Injectable 4 milliGRAM(s) IV Push every 6 hours PRN Nausea and/or Vomiting    REVIEW OF SYSTEMS:  CONSTITUTIONAL: No fever, weight loss, or fatigue  EYES: No eye pain, visual disturbances, or discharge  ENMT:  No difficulty hearing, tinnitus, vertigo; No sinus or throat pain  NECK: No pain or stiffness  BREASTS: No pain, masses, or nipple discharge  RESPIRATORY: No cough, wheezing, chills or hemoptysis; No shortness of breath  CARDIOVASCULAR: No chest pain, palpitations, dizziness, or leg swelling  GASTROINTESTINAL: No abdominal or epigastric pain. No nausea, vomiting, or hematemesis; No diarrhea or constipation. No melena or hematochezia.  GENITOURINARY: No dysuria, frequency, hematuria, or incontinence  NEUROLOGICAL: No headaches, memory loss, loss of strength, numbness, or tremors  SKIN: No itching, burning, rashes, or lesions   LYMPH NODES: No enlarged glands  ENDOCRINE: No heat or cold intolerance; No hair loss  MUSCULOSKELETAL: No joint pain or swelling; No muscle, back, or extremity pain  PSYCHIATRIC: No depression, anxiety, mood swings, or difficulty sleeping    Vital Signs Last 24 Hrs  T(C): 36.9 (16 Nov 2021 10:15), Max: 37.1 (16 Nov 2021 01:04)  T(F): 98.5 (16 Nov 2021 10:15), Max: 98.8 (16 Nov 2021 01:04)  HR: 68 (16 Nov 2021 14:09) (52 - 68)  BP: 135/74 (16 Nov 2021 14:09) (115/74 - 144/86)  BP(mean): 96 (16 Nov 2021 14:09) (89 - 108)  RR: 18 (16 Nov 2021 14:09) (14 - 19)  SpO2: 99% (16 Nov 2021 14:18) (95% - 99%)    PHYSICAL EXAM:  GENERAL: NAD, well-groomed, well-developed  HEAD:  Atraumatic, Normocephalic  EYES: EOMI, PERRLA, conjunctiva and sclera clear  ENMT: No tonsillar erythema, exudates, or enlargement; Moist mucous membranes, Good dentition, No lesions  NECK: Supple, No JVD, Normal thyroid  NERVOUS SYSTEM:  Alert & Oriented X3, Good concentration; Motor Strength 5/5 B/L upper and lower extremities; DTRs 2+ intact and symmetric  CHEST/LUNG: Clear to percussion bilaterally; No rales, rhonchi, wheezing, or rubs  HEART: Regular rate and rhythm; No murmurs, rubs, or gallops  ABDOMEN: Soft, Nontender, Nondistended; Bowel sounds present  EXTREMITIES:  2+ Peripheral Pulses, No clubbing, cyanosis, or edema  LYMPH: No lymphadenopathy noted  SKIN: No rashes or lesions    Consultant(s) Notes Reviewed:  [x ] YES  [ ] NO  Care Discussed with Consultants/Other Providers [ x] YES  [ ] NO    LABS:                        14.0   10.08 )-----------( 383      ( 15 Nov 2021 06:20 )             42.5     11-15    138  |  102  |  15  ----------------------------<  104<H>  3.9   |  25  |  0.95    Ca    9.1      15 Nov 2021 06:20  Phos  3.9     11-15  Mg     2.2     11-15          CAPILLARY BLOOD GLUCOSE            RADIOLOGY & ADDITIONAL TESTS:  Echo:        LVSF:        EF:        RVSF:        LA:        RA:        Mitral Valve:        Aortic Valve:       Tricuspid Valve:        Pulmonic Valve:        Pericardium:   Imaging Personally Reviewed:  [ ] YES  [ ] NO

## 2021-11-16 NOTE — PROGRESS NOTE ADULT - REASON FOR ADMISSION
dizziness, R dysmetria

## 2021-11-17 LAB
CONFIRM APTT STACLOT: NEGATIVE — SIGNIFICANT CHANGE UP
DRVVT SCREEN TO CONFIRM RATIO: SIGNIFICANT CHANGE UP
LA NT DPL PPP QL: 24.9 SEC — SIGNIFICANT CHANGE UP
PROT S FREE PPP-ACNC: 82 % — SIGNIFICANT CHANGE UP (ref 63–140)

## 2021-11-18 PROBLEM — Z00.00 ENCOUNTER FOR PREVENTIVE HEALTH EXAMINATION: Status: ACTIVE | Noted: 2021-11-18

## 2021-11-20 DIAGNOSIS — R42 DIZZINESS AND GIDDINESS: ICD-10-CM

## 2021-11-20 DIAGNOSIS — R29.702 NIHSS SCORE 2: ICD-10-CM

## 2021-11-20 DIAGNOSIS — R29.898 OTHER SYMPTOMS AND SIGNS INVOLVING THE MUSCULOSKELETAL SYSTEM: ICD-10-CM

## 2021-11-20 DIAGNOSIS — Q21.1 ATRIAL SEPTAL DEFECT: ICD-10-CM

## 2021-11-20 DIAGNOSIS — Z79.82 LONG TERM (CURRENT) USE OF ASPIRIN: ICD-10-CM

## 2021-11-20 DIAGNOSIS — I63.89 OTHER CEREBRAL INFARCTION: ICD-10-CM

## 2021-11-20 DIAGNOSIS — I77.89 OTHER SPECIFIED DISORDERS OF ARTERIES AND ARTERIOLES: ICD-10-CM

## 2021-11-20 DIAGNOSIS — Z88.2 ALLERGY STATUS TO SULFONAMIDES: ICD-10-CM

## 2021-11-20 DIAGNOSIS — R47.1 DYSARTHRIA AND ANARTHRIA: ICD-10-CM

## 2021-12-01 ENCOUNTER — NON-APPOINTMENT (OUTPATIENT)
Age: 51
End: 2021-12-01

## 2021-12-01 ENCOUNTER — APPOINTMENT (OUTPATIENT)
Dept: NEUROLOGY | Facility: CLINIC | Age: 51
End: 2021-12-01
Payer: MEDICAID

## 2021-12-01 ENCOUNTER — LABORATORY RESULT (OUTPATIENT)
Age: 51
End: 2021-12-01

## 2021-12-01 VITALS
DIASTOLIC BLOOD PRESSURE: 89 MMHG | TEMPERATURE: 97.1 F | BODY MASS INDEX: 27.32 KG/M2 | HEART RATE: 72 BPM | OXYGEN SATURATION: 98 % | HEIGHT: 65 IN | SYSTOLIC BLOOD PRESSURE: 123 MMHG | WEIGHT: 164 LBS

## 2021-12-01 LAB — PLATELET RESPONSE ASPIRIN: 350 ARU

## 2021-12-01 PROCEDURE — 99214 OFFICE O/P EST MOD 30 MIN: CPT

## 2021-12-02 LAB
APTT 2H P 1:4 NP PPP: NORMAL
APTT 2H P INC PPP: NORMAL
APTT IMM NP/PRE NP PPP: NORMAL
APTT INV RATIO PPP: 31.8 SEC
AT III PPP CHRO-ACNC: 116 %
B2 GLYCOPROT1 AB SER QL: NEGATIVE
CARDIOLIPIN AB SER IA-ACNC: NEGATIVE
CONFIRM: 30.3 SEC
DRVVT IMM 1:2 NP PPP: NORMAL
DRVVT SCREEN TO CONFIRM RATIO: 0.94 RATIO
FOLATE SERPL-MCNC: 4.6 NG/ML
NPP NORMAL POOLED PLASMA: NORMAL SECS
SCREEN DRVVT: 37.9 SEC
VIT B12 SERPL-MCNC: 1203 PG/ML

## 2021-12-02 RX ORDER — UBROGEPANT 100 MG/1
100 TABLET ORAL
Qty: 10 | Refills: 1 | Status: DISCONTINUED | COMMUNITY
Start: 2021-12-01 | End: 2021-12-02

## 2021-12-02 NOTE — ASSESSMENT
[FreeTextEntry1] : Pt is 51yoF here for f/u of R superior cerebellar and L occipital horn infarcts as seen on MRI which initially presented as dizziness, R dysmetria. Pertinent risk factors for stroke are +PFO with hx of migraines. Would recommend PFO closure for now. WIll like her to remain on DAPT for max up to 3 months at least pending ILR neg for any PAF.  Hypercoag workup negative however patient may have family hx of thrombophilia, so will order the panel again.  \par \par Plan \par \par #1 CVA\par Continue Aspirin 81mg daily and plavix 75mg daily for secondary stroke prevention. Plan to stop dual AP after 3 months maximum if no afib found on ILR at that time, then go to single AP based on accumetric results. Pt advised to monitor for any blood in urine or stool\par Continue Atorvastatin 40mg daily for secondary stroke prevention. Monitor for any cramps or muscle pain. Goal LDL <70.   \par \par #2 Further workup - \par Refer to EP for ILR placement\par \par #3 Stroke risk factors and lifestyle\par PFO- refer to structural heart to evaluate for closure \par Migraines-  Will d/c sumatriptan (contraindicated with hx stroke). Instead will rx nurtec for migraine breakthrough (insurance does not cover ubrelvy).\par \par R/o PAF\par Repeat coags, full panel \par -Counselled on healthy eating (DASH/ Mediterranean diet, limiting red meats)\par -Counselled on importance of regular exercise and remaining active\par -Advised to f/u with PCP regarding regular health maintenance and prevention, especially for routine, age-appropriate malignancy screenings \par \par \par \par -Counselled on signs of stroke BEFAST and to call 911 with any new or worsening neurological symptoms \par RTO in 4-6 weeks, with attending \par

## 2021-12-02 NOTE — PHYSICAL EXAM
[FreeTextEntry1] : Physical exam:\par General: No acute distress, awake and alert\par Eyes: Anicteric sclerae, moist conjunctivae, see below for CNs\par Neck: trachea midline, FROM, supple, no thyromegaly or lymphadenopathy\par Cardiovascular: Regular rate and rhythm, no murmurs, rubs, or gallops. No carotid bruits. \par Pulmonary: Anterior breath sounds clear bilaterally, no crackles or wheezing. No use of accessory muscles\par GI: Abdomen soft, non-distended, non-tender\par Extremities: Radial and DP pulses +2, no edema\par \par Neurologic:\par -Mental status: Awake, alert, oriented to person, place, and time. Speech is fluent with intact naming, repetition, and comprehension, +mild dysarthria. Recent and remote memory intact. Follows commands. Attention/concentration intact. Fund of knowledge appropriate.\par -Cranial nerves: \par II: Visual fields are full to confrontation.\par III, IV, VI: Extraocular movements are intact without nystagmus. Pupils equally round and reactive to light\par V:  Facial sensation V1-V3 equal and intact \par VII: Face is symmetric \par Motor: Normal bulk and tone. No pronator drift. Strength bilateral upper extremity 5/5, bilateral lower extremities 5/5.\par Mild disdiadockinesia R with rapid alternating movements, L is intact\par Sensation: Intact to light touch bilaterally. No neglect or extinction on double simultaneous testing.\par Coordination: +dysmetria on right finger-to-nose and slight dysmetria of right heel-to-shin. No dysmetria of left side

## 2021-12-02 NOTE — HISTORY OF PRESENT ILLNESS
[FreeTextEntry1] : The patient is a very pleasant 51-year-old female here for f/u of stroke. She was initially admitted 11/12 after presenting with sudden onset vertigo, right-sided dysmetria, and dysarthria. CTH negative. CTA showed hypoplastic R vertebral artery. She declined tpa or an endovascular intervention. MRI confirmed an acute right superior cerebellum/peduncle and left occipital stroke. MRA again showed a hypoplastic right vertebral artery but no dissection and no occlusive disease otherwise.  Given unclear mechanism of stroke, hypercoagulable panel was negative. Other pertinent labs- , TSH 1.38. TTE/LITO were unremarkable apart from PFO. Lower extremity dopplers- negative for DVT. She underwent ILR placement prior to discharge. D/C home on aspirin 81 mg daily, Plavix 75 mg daily, and atorvastatin 40 mg daily.  \par \par Since d/c she has been doing well. She has been taking her medications as rx with no ADEs such as blood in urine or stool, cramps/ muscle pain. She denies any recurrent vertigo episodes. She continues to experience some mild weakness in her R arm which has remained stable. She is planning to start PT/OT next week. \par \par Pertinent history: \par Smoking- 10 year total, approximately 2-6 a day, quit 20 years ago\par ETOH- denies ever \par Denies excessive caffeine or coffee intake\par Recreational drug use- denies\par Denies hx of miscarriages\par Has never been on hormonal birth control \par Mom with 2 miscarriages- 1 was at 5 months, mom also had unprovoked DVT at 73yo when she was otherwise healthy. Pt denies other personal or family history of any bleeding/ clotting disorders/ stroke/MI/PE. \par Very active, dance instructor \par Diet is in general very healthy. she eats a lot of salads, very rarely has red meats, has chicken/ fish \par Of note, she endorses a hx of migraines since she was a teenager, triggers are weather changes, lack of sleep, and hormone changes such as around time of her period. Does not get severe migraines regularly and denies frequent headaches in general. Has been taking sumatriptan PRN when her headaches are severe\par SUNG- low risk \par

## 2021-12-06 ENCOUNTER — NON-APPOINTMENT (OUTPATIENT)
Age: 51
End: 2021-12-06

## 2021-12-06 LAB
B2 GLYCOPROT1 IGA SERPL IA-ACNC: <5 SAU
B2 GLYCOPROT1 IGG SER-ACNC: <5 SGU
B2 GLYCOPROT1 IGM SER-ACNC: <5 SMU
CARDIOLIPIN IGM SER-MCNC: 8.1 MPL
CARDIOLIPIN IGM SER-MCNC: <5 GPL
DEPRECATED CARDIOLIPIN IGA SER: <5 APL
HOMOCYSTEINE LEVEL: 12.7 UMOL/L
METHYLMALONIC ACID LEVEL: 173 NMOL/L

## 2021-12-07 ENCOUNTER — NON-APPOINTMENT (OUTPATIENT)
Age: 51
End: 2021-12-07

## 2021-12-07 LAB
DNA PLOIDY SPEC FC-IMP: NORMAL
PTR INTERP: NORMAL

## 2021-12-08 ENCOUNTER — APPOINTMENT (OUTPATIENT)
Dept: HEART AND VASCULAR | Facility: CLINIC | Age: 51
End: 2021-12-08
Payer: MEDICAID

## 2021-12-08 ENCOUNTER — TRANSCRIPTION ENCOUNTER (OUTPATIENT)
Age: 51
End: 2021-12-08

## 2021-12-08 VITALS
BODY MASS INDEX: 26.66 KG/M2 | DIASTOLIC BLOOD PRESSURE: 95 MMHG | HEART RATE: 81 BPM | OXYGEN SATURATION: 97 % | TEMPERATURE: 96 F | WEIGHT: 160 LBS | SYSTOLIC BLOOD PRESSURE: 143 MMHG | HEIGHT: 65 IN

## 2021-12-08 DIAGNOSIS — Z78.9 OTHER SPECIFIED HEALTH STATUS: ICD-10-CM

## 2021-12-08 PROCEDURE — 93291 INTERROG DEV EVAL SCRMS IP: CPT

## 2021-12-08 NOTE — DISCUSSION/SUMMARY
[FreeTextEntry1] : \par 51 year old female who had a CVA 11/2021 s/p ILR, who presents for follow up.  Device incision is healing properly.  There is a scab but device is nowhere near the incision.  Interrogation with no arrhythmias.  Remote monitoring working.  She will follow up in 1 year or sooner if needed and knows to call with any questions or concerns.  \par

## 2021-12-08 NOTE — HISTORY OF PRESENT ILLNESS
[de-identified] : 51 year old female who had a CVA 11/2021 s/p ILR, who presents for follow up.\par \par She has been doing well since she left the hospital.  No palpitations, chest pain, SOB, syncope, near syncope or device related complaints.

## 2021-12-08 NOTE — PHYSICAL EXAM
[General Appearance - Well Developed] : well developed [Normal Appearance] : normal appearance [Well Groomed] : well groomed [General Appearance - Well Nourished] : well nourished [No Deformities] : no deformities [General Appearance - In No Acute Distress] : no acute distress [5th Left ICS - MCL] : palpated at the 5th LICS in the midclavicular line [Normal] : normal [No Precordial Heave] : no precordial heave was noted [Normal Rate] : normal [Rhythm Regular] : regular [Normal S1] : normal S1 [Normal S2] : normal S2 [] : no respiratory distress [Respiration, Rhythm And Depth] : normal respiratory rhythm and effort [Exaggerated Use Of Accessory Muscles For Inspiration] : no accessory muscle use [Clean] : clean [Dry] : dry [Healing Well] : healing well [Palpable Crepitus] : no palpable crepitus [Bleeding] : no active bleeding [Foul Odor] : no foul smell [Purulent Drainage] : no purulent drainage [Serosanguineous Drainage] : no serosanquineous drainage [Serous Drainage] : no serous drainage [Erythema] : not erythematous [Warm] : not warm [Tender] : not tender [Indurated] : not indurated [Fluctuant] : not fluctuant

## 2021-12-13 ENCOUNTER — APPOINTMENT (OUTPATIENT)
Dept: CARDIOTHORACIC SURGERY | Facility: CLINIC | Age: 51
End: 2021-12-13
Payer: MEDICAID

## 2021-12-13 VITALS
BODY MASS INDEX: 27.32 KG/M2 | RESPIRATION RATE: 18 BRPM | DIASTOLIC BLOOD PRESSURE: 79 MMHG | SYSTOLIC BLOOD PRESSURE: 125 MMHG | HEIGHT: 65 IN | HEART RATE: 64 BPM | OXYGEN SATURATION: 97 % | WEIGHT: 164 LBS | TEMPERATURE: 97.2 F

## 2021-12-13 DIAGNOSIS — Z87.891 PERSONAL HISTORY OF NICOTINE DEPENDENCE: ICD-10-CM

## 2021-12-13 PROCEDURE — 99203 OFFICE O/P NEW LOW 30 MIN: CPT

## 2021-12-15 PROBLEM — Z87.891 FORMER SMOKER: Status: ACTIVE | Noted: 2021-12-15

## 2021-12-15 NOTE — PHYSICAL EXAM
[No Acute Distress] : no acute distress [Normal Conjunctiva] : normal conjunctiva [No Carotid Bruit] : no carotid bruit [Normal S1, S2] : normal S1, S2 [No Murmur] : no murmur [Clear Lung Fields] : clear lung fields [Soft] : abdomen soft [Non Tender] : non-tender [Normal Gait] : normal gait [No Edema] : no edema [No Rash] : no rash [Moves all extremities] : moves all extremities [Alert and Oriented] : alert and oriented

## 2021-12-17 ENCOUNTER — EMERGENCY (EMERGENCY)
Facility: HOSPITAL | Age: 51
LOS: 1 days | Discharge: HOME | End: 2021-12-17
Attending: EMERGENCY MEDICINE | Admitting: EMERGENCY MEDICINE
Payer: MEDICAID

## 2021-12-17 VITALS
TEMPERATURE: 98 F | HEART RATE: 85 BPM | SYSTOLIC BLOOD PRESSURE: 120 MMHG | RESPIRATION RATE: 17 BRPM | HEIGHT: 65 IN | OXYGEN SATURATION: 98 % | DIASTOLIC BLOOD PRESSURE: 87 MMHG | WEIGHT: 162.04 LBS

## 2021-12-17 VITALS
DIASTOLIC BLOOD PRESSURE: 81 MMHG | OXYGEN SATURATION: 98 % | TEMPERATURE: 98 F | SYSTOLIC BLOOD PRESSURE: 117 MMHG | RESPIRATION RATE: 16 BRPM | HEART RATE: 75 BPM

## 2021-12-17 DIAGNOSIS — Z79.02 LONG TERM (CURRENT) USE OF ANTITHROMBOTICS/ANTIPLATELETS: ICD-10-CM

## 2021-12-17 DIAGNOSIS — H93.19 TINNITUS, UNSPECIFIED EAR: ICD-10-CM

## 2021-12-17 DIAGNOSIS — Z79.82 LONG TERM (CURRENT) USE OF ASPIRIN: ICD-10-CM

## 2021-12-17 DIAGNOSIS — Z91.041 RADIOGRAPHIC DYE ALLERGY STATUS: ICD-10-CM

## 2021-12-17 LAB
ALBUMIN SERPL ELPH-MCNC: 4 G/DL — SIGNIFICANT CHANGE UP (ref 3.4–5)
ALP SERPL-CCNC: 56 U/L — SIGNIFICANT CHANGE UP (ref 40–120)
ALT FLD-CCNC: 13 U/L — SIGNIFICANT CHANGE UP (ref 12–42)
ANION GAP SERPL CALC-SCNC: 8 MMOL/L — LOW (ref 9–16)
AST SERPL-CCNC: 13 U/L — LOW (ref 15–37)
BASOPHILS # BLD AUTO: 0.06 K/UL — SIGNIFICANT CHANGE UP (ref 0–0.2)
BASOPHILS NFR BLD AUTO: 0.7 % — SIGNIFICANT CHANGE UP (ref 0–2)
BILIRUB SERPL-MCNC: 0.4 MG/DL — SIGNIFICANT CHANGE UP (ref 0.2–1.2)
BUN SERPL-MCNC: 12 MG/DL — SIGNIFICANT CHANGE UP (ref 7–23)
CALCIUM SERPL-MCNC: 9 MG/DL — SIGNIFICANT CHANGE UP (ref 8.5–10.5)
CHLORIDE SERPL-SCNC: 101 MMOL/L — SIGNIFICANT CHANGE UP (ref 96–108)
CO2 SERPL-SCNC: 29 MMOL/L — SIGNIFICANT CHANGE UP (ref 22–31)
CREAT SERPL-MCNC: 0.94 MG/DL — SIGNIFICANT CHANGE UP (ref 0.5–1.3)
EOSINOPHIL # BLD AUTO: 0.07 K/UL — SIGNIFICANT CHANGE UP (ref 0–0.5)
EOSINOPHIL NFR BLD AUTO: 0.8 % — SIGNIFICANT CHANGE UP (ref 0–6)
GLUCOSE SERPL-MCNC: 93 MG/DL — SIGNIFICANT CHANGE UP (ref 70–99)
HCT VFR BLD CALC: 42.1 % — SIGNIFICANT CHANGE UP (ref 34.5–45)
HGB BLD-MCNC: 14.1 G/DL — SIGNIFICANT CHANGE UP (ref 11.5–15.5)
IMM GRANULOCYTES NFR BLD AUTO: 0.4 % — SIGNIFICANT CHANGE UP (ref 0–1.5)
LYMPHOCYTES # BLD AUTO: 1.48 K/UL — SIGNIFICANT CHANGE UP (ref 1–3.3)
LYMPHOCYTES # BLD AUTO: 17.9 % — SIGNIFICANT CHANGE UP (ref 13–44)
MCHC RBC-ENTMCNC: 29.6 PG — SIGNIFICANT CHANGE UP (ref 27–34)
MCHC RBC-ENTMCNC: 33.5 GM/DL — SIGNIFICANT CHANGE UP (ref 32–36)
MCV RBC AUTO: 88.4 FL — SIGNIFICANT CHANGE UP (ref 80–100)
MONOCYTES # BLD AUTO: 0.51 K/UL — SIGNIFICANT CHANGE UP (ref 0–0.9)
MONOCYTES NFR BLD AUTO: 6.2 % — SIGNIFICANT CHANGE UP (ref 2–14)
NEUTROPHILS # BLD AUTO: 6.12 K/UL — SIGNIFICANT CHANGE UP (ref 1.8–7.4)
NEUTROPHILS NFR BLD AUTO: 74 % — SIGNIFICANT CHANGE UP (ref 43–77)
NRBC # BLD: 0 /100 WBCS — SIGNIFICANT CHANGE UP (ref 0–0)
PLATELET # BLD AUTO: 293 K/UL — SIGNIFICANT CHANGE UP (ref 150–400)
POTASSIUM SERPL-MCNC: 4.5 MMOL/L — SIGNIFICANT CHANGE UP (ref 3.5–5.3)
POTASSIUM SERPL-SCNC: 4.5 MMOL/L — SIGNIFICANT CHANGE UP (ref 3.5–5.3)
PROT SERPL-MCNC: 7.3 G/DL — SIGNIFICANT CHANGE UP (ref 6.4–8.2)
RBC # BLD: 4.76 M/UL — SIGNIFICANT CHANGE UP (ref 3.8–5.2)
RBC # FLD: 13 % — SIGNIFICANT CHANGE UP (ref 10.3–14.5)
SODIUM SERPL-SCNC: 138 MMOL/L — SIGNIFICANT CHANGE UP (ref 132–145)
WBC # BLD: 8.27 K/UL — SIGNIFICANT CHANGE UP (ref 3.8–10.5)
WBC # FLD AUTO: 8.27 K/UL — SIGNIFICANT CHANGE UP (ref 3.8–10.5)

## 2021-12-17 PROCEDURE — 70496 CT ANGIOGRAPHY HEAD: CPT | Mod: 26

## 2021-12-17 PROCEDURE — 99285 EMERGENCY DEPT VISIT HI MDM: CPT

## 2021-12-17 NOTE — ED PROVIDER NOTE - PHYSICAL EXAMINATION
VSS in NAD non toxic appearing   NCAT EOMI PERRL OP clear  TMs clear bilaterally   normal neuro exam CN I-XII grossly intact, no groos motor or sensory deficits  no peripheral c/c/e

## 2021-12-17 NOTE — ED PROVIDER NOTE - PATIENT PORTAL LINK FT
You can access the FollowMyHealth Patient Portal offered by [facility] by registering at the following website: [email or web address]. By joining [employer]’s FollowMyHealth portal, you will also be able to view your health information using other applications (apps) compatible with our system.

## 2021-12-17 NOTE — ED ADULT NURSE NOTE - OBJECTIVE STATEMENT
PT came in c/o of ringing in ear x 4 weeks. Pt s/p cerebellar cva x 5 weeks ago. No lasting deficit. PT reports ringing in ear x 4 weeks and was told by neurologist that it was viral. PT comes to ed for second opinion. BEFAST negative, denies fever, chlls, sob, cp, n/v/d. A&Ox3 speaking in full sentences. Will continue to monitor

## 2021-12-17 NOTE — ED PROVIDER NOTE - PROGRESS NOTE DETAILS
spoke with dr khan stroke consult, [facility] CT and CTA, if negative, dc home f/or ENT followup.

## 2021-12-17 NOTE — ED PROVIDER NOTE - OBJECTIVE STATEMENT
50 yo female with c/o tinnitus, gradual onset over the last several days, has a recent hx cerebellar stroke, treated with TPA, hospitalized and [facility] and found to have PFO, placed on blood thinners, currently take baby aspirin dn PO plavix. Has ENT visit scheduled for 1 week but wanted to come to D for eval.

## 2021-12-17 NOTE — ED ADULT TRIAGE NOTE - CHIEF COMPLAINT QUOTE
s/p cva 5 weeks ago, bilat ear "hissing and ringing" x 4 weeks. Told by neurology that it was "viral" pt requesting a 2nd opinion

## 2021-12-17 NOTE — ED ADULT NURSE NOTE - NSIMPLEMENTINTERV_GEN_ALL_ED
Implemented All Universal Safety Interventions:  [place] to call system. Call bell, personal items and telephone within reach. Instruct patient to call for assistance. Room bathroom lighting operational. Non-slip footwear when patient is off stretcher. Physically safe environment: no spills, clutter or unnecessary equipment. Stretcher in lowest position, wheels locked, appropriate side rails in place.

## 2021-12-17 NOTE — ED PROVIDER NOTE - NSFOLLOWUPINSTRUCTIONS_ED_ALL_ED_FT
Log Out.      [employer] CareNotes®     :  [facility]  	                       TINNITUS - AfterCare(R) Instructions(ER/ED)           Tinnitus    WHAT YOU NEED TO KNOW:    Tinnitus is when you hear ringing, clicking, buzzing, or hissing in one or both ears. You may also hear whistling, chirping, or pulsing. It may be soft or loud, and at a low or high pitch. Tinnitus that lasts for longer than 6 months is considered chronic.    DISCHARGE INSTRUCTIONS:    Call 911 if:   •You feel like hurting yourself or others because of the constant noise.          Contact your healthcare provider if:   •You have headaches.      •You are tired and have trouble concentrating or remembering things.      •You have more anxiety or stress than usual.      •You have deep sadness or depression.      •You have trouble falling asleep or staying asleep.      •Your symptoms do not go away or they get worse.      •You have questions or concerns about your condition or care.      Manage tinnitus:   •Counseling can help you learn ways to relax, decrease stress, and make your tinnitus less noticeable.       •Cognitive behavioral therapy helps you understand your condition. Your therapist will help you learn to cope with tinnitus. You may also learn new ways to relax and retrain your behavior to decrease your symptoms.      •Sound therapy, such as white noise machines, may help cover your tinnitus with a pleasant sound. Sound therapy devices can help you fall asleep or help you relax. These devices can be worn in your ear or placed next to your bed at night.      •Hearing aids or cochlear implants may help if you have hearing loss.      •Do not smoke. Nicotine decreases blood flow to your ear and can make your tinnitus worse. Do not use e-cigarettes or smokeless tobacco in place of cigarettes or to help you quit. They still contain nicotine. Ask your healthcare provider for information if you currently smoke and need help quitting.      •Decrease how much alcohol and caffeine you drink. Alcohol and caffeine can make your tinnitus worse.      Prevent tinnitus:   •Avoid exposure to loud noise, such as loud music or power tools. Occasional exposure can still cause tinnitus. Move away from the noise or turn down the volume.      •Wear ear protection when you are exposed to loud noises. Good ear protection includes ear plugs or headphones that reduce noise.      Follow up with your healthcare provider in 1 to 2 months: Your healthcare provider may refer you to an otolaryngologist, audiologist, or neurologist. You may need to return for regular follow-up visits. Write your questions down so you remember to ask them during your visits.       © Copyright [employer] 2021           back to top                          © Copyright [employer] 2021

## 2021-12-21 NOTE — HISTORY OF PRESENT ILLNESS
[FreeTextEntry1] : 51 year old former smoking female who was recently admitted for Right superior cerebellum/peduncle and left occipital stroke, Loop recorder placement, patent foramen ovale who was referred for further evaluation of her structural heart disease. \par \par The patient was admitted at [facility] after an acute onset of vertigo and dysarthria  and was found to have Right superior cerebellum/peduncle and left occipital stroke.   Dopplers were negative for DVT. Hypercoagulable workup was sent. LITO showed PFO.  Loop recorder was placed.  She was discharged on Plavix \par \par Since her discharge, the patient states she is feeling well with no complaints. She denies any SOB at rest, chest pain, palpitations, dizziness, syncope or LE edema. She also denies any new neurological deficits. \par \par The patient recently saw Dr. Diaz on 12/8/2021 who interrogated which showed no events\par \par The patient lives alone and remains independent in all her ADLs. 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

## 2021-12-30 ENCOUNTER — NON-APPOINTMENT (OUTPATIENT)
Age: 51
End: 2021-12-30

## 2021-12-30 ENCOUNTER — APPOINTMENT (OUTPATIENT)
Dept: OTOLARYNGOLOGY | Facility: CLINIC | Age: 51
End: 2021-12-30
Payer: MEDICAID

## 2021-12-30 VITALS
DIASTOLIC BLOOD PRESSURE: 80 MMHG | WEIGHT: 162 LBS | HEART RATE: 63 BPM | OXYGEN SATURATION: 97 % | SYSTOLIC BLOOD PRESSURE: 122 MMHG | HEIGHT: 65 IN | BODY MASS INDEX: 26.99 KG/M2 | TEMPERATURE: 97.5 F

## 2021-12-30 DIAGNOSIS — H61.23 IMPACTED CERUMEN, BILATERAL: ICD-10-CM

## 2021-12-30 DIAGNOSIS — H93.19 TINNITUS, UNSPECIFIED EAR: ICD-10-CM

## 2021-12-30 PROCEDURE — 99203 OFFICE O/P NEW LOW 30 MIN: CPT | Mod: 25

## 2021-12-30 PROCEDURE — 92550 TYMPANOMETRY & REFLEX THRESH: CPT

## 2021-12-30 PROCEDURE — 92557 COMPREHENSIVE HEARING TEST: CPT

## 2021-12-31 PROBLEM — H93.19 TINNITUS, UNSPECIFIED LATERALITY: Status: ACTIVE | Noted: 2021-12-31

## 2021-12-31 PROBLEM — I63.9 CEREBRAL INFARCTION, UNSPECIFIED: Chronic | Status: ACTIVE | Noted: 2021-12-17

## 2021-12-31 NOTE — ASSESSMENT
[FreeTextEntry1] : Discussed general tinnitus treatment including masking, amplification when appropriate, alternative treatments, helpful smartphone applications, CBT and OTC medications. With permission from the prescribing physician she may try substituting or a holiday from the new meds to see if they're contributors. \par \par RTC any worsening or new sxs\par

## 2021-12-31 NOTE — HISTORY OF PRESENT ILLNESS
[de-identified] : Had a cerebellar CVA a/w a PFO 11/12/21 and is now on Plavix & atorvastatin; initial vertigo & dysarthria have resolved completely. Sev wks later she developed continuous nonpulsatile nonlateralizing hissing w/o hearing loss. Denies: ear pain, drainage, frequent loud noise exp/shooting, frequent infections, hx of ear surgery or IV antibiotics/chemo; denies a her father has hearing loss. Qtip use: daily. Hx cerumen occlusions.

## 2021-12-31 NOTE — PHYSICAL EXAM
[Binocular Microscopic Exam] : Binocular microscopic exam was performed [Normal] : assessment of respiratory effort is normal [FreeTextEntry8] : obstructing cerumen removed with a loop [FreeTextEntry9] : obstructing cerumen removed with a loop

## 2022-01-19 ENCOUNTER — APPOINTMENT (OUTPATIENT)
Dept: NEUROLOGY | Facility: CLINIC | Age: 52
End: 2022-01-19

## 2022-01-19 ENCOUNTER — APPOINTMENT (OUTPATIENT)
Dept: NEUROLOGY | Facility: CLINIC | Age: 52
End: 2022-01-19
Payer: MEDICAID

## 2022-01-19 PROCEDURE — 99214 OFFICE O/P EST MOD 30 MIN: CPT | Mod: 95

## 2022-01-19 NOTE — HISTORY OF PRESENT ILLNESS
[Home] : at home, [unfilled] , at the time of the visit. [Medical Office: ([facility])___] : at the medical office located in  [Verbal consent obtained from patient] : the patient, [unfilled] [FreeTextEntry1] : The patient is a very pleasant 51-year-old female admitted 11/12 after presenting with sudden onset vertigo, right-sided dysmetria, and dysarthria.  MRI confirmed an acute right superior cerebellum/peduncle and left occipital stroke.  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 Hypercoag studies were negative X2.  TTE/LITO were unrevealing apart from PFO associated with an interatrial septal aneurysm. DVT US were negative. She is s/p ILR placement without documented a fib. She was dismissed on DAPT and high-intensity statin (). She was evaluated by MARK Doyle in follow-up in December at which time she was doing well without recurrent stroke symptoms and was tolerating the medications without side effects as well. She has completed therapy and feels she nearly back ot her normal self. She is hoping to return to work soon. \par \par Since, she has been evaluated by ENT for ?pulsatile tinnitus. CT, CTA was essentially unchanged form prior and negative for acute changes. No other pathology was found during their assessment.  She wonders if one of her medications could be causing her tinnitus.  She was evaluated by Dr. Adair in [place] Surgery for consideration of PFO closure.\par \par  She otherwise denies any new symptoms apart from a 2 hour episode of vertigo earlier this week which was without associated neurologic symptoms.  \par

## 2022-01-19 NOTE — PHYSICAL EXAM
[FreeTextEntry1] : Exam is limited due to nature of telehealth visit.  She is alert.  Fully oriented.  Speech and language are intact.  Cranial nerves are grossly unremarkable.  She has no significant motor deficit.

## 2022-01-19 NOTE — ASSESSMENT
[FreeTextEntry1] : The patient is a very pleasant 51-year-old female with a history of hyperlipidemia and migraine headaches here for follow-up of recent multifocal, embolic–appearing strokes in the setting of PFO with no other cause of stroke identified to date.  Given recurrent episode of vertigo, will obtain repeat MRI to r/o stroke.\par \par We discussed that typically I continue dual antiplatelet therapy for 30 days in the absence of large vessel disease before transitioning to monotherapy; however, it is not unreasonable to pursue a 3 month course as long as she is tolerating the medications. After the 3 month period of DAPT, she can transition to aspirin alone as she was not on this previously. She should continue statin therapy with repeat lipids at her next visit. Given the otherwise cryptogenic nature of her stroke, I support closing her PFO assuming no a fib is detected on her monitor in the interim. She will continue to work closely with PCP for vascular risk-factor modification/reduction.

## 2022-01-23 ENCOUNTER — FORM ENCOUNTER (OUTPATIENT)
Age: 52
End: 2022-01-23

## 2022-02-08 ENCOUNTER — TRANSCRIPTION ENCOUNTER (OUTPATIENT)
Age: 52
End: 2022-02-08

## 2022-02-11 ENCOUNTER — TRANSCRIPTION ENCOUNTER (OUTPATIENT)
Age: 52
End: 2022-02-11

## 2022-02-14 ENCOUNTER — NON-APPOINTMENT (OUTPATIENT)
Age: 52
End: 2022-02-14

## 2022-02-14 ENCOUNTER — APPOINTMENT (OUTPATIENT)
Dept: CARDIOTHORACIC SURGERY | Facility: CLINIC | Age: 52
End: 2022-02-14
Payer: MEDICAID

## 2022-02-14 ENCOUNTER — APPOINTMENT (OUTPATIENT)
Dept: HEART AND VASCULAR | Facility: CLINIC | Age: 52
End: 2022-02-14
Payer: MEDICAID

## 2022-02-14 VITALS
HEIGHT: 65 IN | DIASTOLIC BLOOD PRESSURE: 84 MMHG | WEIGHT: 162 LBS | SYSTOLIC BLOOD PRESSURE: 149 MMHG | BODY MASS INDEX: 26.99 KG/M2 | HEART RATE: 61 BPM

## 2022-02-14 PROCEDURE — 99213 OFFICE O/P EST LOW 20 MIN: CPT

## 2022-02-14 PROCEDURE — 93291 INTERROG DEV EVAL SCRMS IP: CPT

## 2022-02-15 NOTE — REVIEW OF SYSTEMS
[Negative] : Heme/Lymph [Fever] : no fever [Weight Gain (___ Lbs)] : no recent weight gain [Chills] : no chills [Feeling Fatigued] : not feeling fatigued [Weight Loss (___ Lbs)] : no recent weight loss [SOB] : no shortness of breath [Dyspnea on exertion] : not dyspnea during exertion [Chest Discomfort] : no chest discomfort [Palpitations] : no palpitations [Syncope] : no syncope

## 2022-02-15 NOTE — HISTORY OF PRESENT ILLNESS
[de-identified] : 51 year old female who had a CVA 11/2021 s/p ILR, who presents for follow up.\par \par She has been doing well since her last visit.  Remote monitoring working.  No palpitations, chest pain, SOB, syncope, near syncope or device related complaints.  She saw Dr. Adair today who recommended a visit as she is being considered for a PFO closure.\par \par

## 2022-02-15 NOTE — PHYSICAL EXAM
[General Appearance - Well Developed] : well developed [Normal Appearance] : normal appearance [Well Groomed] : well groomed [General Appearance - Well Nourished] : well nourished [No Deformities] : no deformities [General Appearance - In No Acute Distress] : no acute distress [] : no respiratory distress [Respiration, Rhythm And Depth] : normal respiratory rhythm and effort [Exaggerated Use Of Accessory Muscles For Inspiration] : no accessory muscle use [Clean] : clean [Dry] : dry [Healing Well] : healing well [5th Left ICS - MCL] : palpated at the 5th LICS in the midclavicular line [Normal] : normal [No Precordial Heave] : no precordial heave was noted [Normal Rate] : normal [Rhythm Regular] : regular [Normal S1] : normal S1 [Normal S2] : normal S2 [Heart Rate And Rhythm] : heart rate and rhythm were normal [Heart Sounds] : normal S1 and S2 [Palpable Crepitus] : no palpable crepitus [Bleeding] : no active bleeding [Foul Odor] : no foul smell [Purulent Drainage] : no purulent drainage [Serous Drainage] : no serous drainage [Erythema] : not erythematous [Warm] : not warm [Tender] : not tender [Indurated] : not indurated [Fluctuant] : not fluctuant

## 2022-02-16 VITALS
RESPIRATION RATE: 18 BRPM | SYSTOLIC BLOOD PRESSURE: 147 MMHG | HEART RATE: 77 BPM | DIASTOLIC BLOOD PRESSURE: 89 MMHG | OXYGEN SATURATION: 97 %

## 2022-02-22 NOTE — HISTORY OF PRESENT ILLNESS
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 \par \par Since her last visit, the patient states she is feeling well with no complaints. She discontinues her Plavix after three months, however continues to take her ASA.  The patient denies any new neurological events. She denies any SOB, chest pain, palpitations, dizziness, syncope. \par \par The patient had a routine follow up with her GI specialist, Dr. Victorino Vee for positive fecal occult.  She has a virtual colonoscopy scheduled soon. \par \par Since her last visit, her loop recorder has not been interrogated. \par \par The patient lives alone and remains independent in all her ADLs. 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

## 2022-03-02 ENCOUNTER — APPOINTMENT (OUTPATIENT)
Dept: HEART AND VASCULAR | Facility: CLINIC | Age: 52
End: 2022-03-02
Payer: MEDICAID

## 2022-03-02 ENCOUNTER — NON-APPOINTMENT (OUTPATIENT)
Age: 52
End: 2022-03-02

## 2022-03-02 PROCEDURE — 93298 REM INTERROG DEV EVAL SCRMS: CPT

## 2022-03-02 PROCEDURE — G2066: CPT

## 2022-03-21 ENCOUNTER — OUTPATIENT (OUTPATIENT)
Dept: OUTPATIENT SERVICES | Facility: HOSPITAL | Age: 52
LOS: 1 days | End: 2022-03-21
Payer: MEDICAID

## 2022-03-21 ENCOUNTER — APPOINTMENT (OUTPATIENT)
Dept: CARDIOTHORACIC SURGERY | Facility: CLINIC | Age: 52
End: 2022-03-21
Payer: MEDICAID

## 2022-03-21 ENCOUNTER — LABORATORY RESULT (OUTPATIENT)
Age: 52
End: 2022-03-21

## 2022-03-21 DIAGNOSIS — I63.9 CEREBRAL INFARCTION, UNSPECIFIED: ICD-10-CM

## 2022-03-21 LAB
ALBUMIN SERPL ELPH-MCNC: 4.3 G/DL — SIGNIFICANT CHANGE UP (ref 3.3–5)
ALP SERPL-CCNC: 53 U/L — SIGNIFICANT CHANGE UP (ref 40–120)
ALT FLD-CCNC: 8 U/L — LOW (ref 10–45)
ANION GAP SERPL CALC-SCNC: 12 MMOL/L — SIGNIFICANT CHANGE UP (ref 5–17)
APTT BLD: 30.9 SEC — SIGNIFICANT CHANGE UP (ref 27.5–35.5)
AST SERPL-CCNC: 15 U/L — SIGNIFICANT CHANGE UP (ref 10–40)
BASOPHILS # BLD AUTO: 0.07 K/UL — SIGNIFICANT CHANGE UP (ref 0–0.2)
BASOPHILS NFR BLD AUTO: 1.3 % — SIGNIFICANT CHANGE UP (ref 0–2)
BILIRUB SERPL-MCNC: 0.6 MG/DL — SIGNIFICANT CHANGE UP (ref 0.2–1.2)
BUN SERPL-MCNC: 15 MG/DL — SIGNIFICANT CHANGE UP (ref 7–23)
CALCIUM SERPL-MCNC: 9.2 MG/DL — SIGNIFICANT CHANGE UP (ref 8.4–10.5)
CHLORIDE SERPL-SCNC: 102 MMOL/L — SIGNIFICANT CHANGE UP (ref 96–108)
CO2 SERPL-SCNC: 24 MMOL/L — SIGNIFICANT CHANGE UP (ref 22–31)
CREAT SERPL-MCNC: 0.88 MG/DL — SIGNIFICANT CHANGE UP (ref 0.5–1.3)
EGFR: 80 ML/MIN/1.73M2 — SIGNIFICANT CHANGE UP
EOSINOPHIL # BLD AUTO: 0.12 K/UL — SIGNIFICANT CHANGE UP (ref 0–0.5)
EOSINOPHIL NFR BLD AUTO: 2.2 % — SIGNIFICANT CHANGE UP (ref 0–6)
GLUCOSE SERPL-MCNC: 79 MG/DL — SIGNIFICANT CHANGE UP (ref 70–99)
HCT VFR BLD CALC: 44 % — SIGNIFICANT CHANGE UP (ref 34.5–45)
HGB BLD-MCNC: 14.3 G/DL — SIGNIFICANT CHANGE UP (ref 11.5–15.5)
IMM GRANULOCYTES NFR BLD AUTO: 0.4 % — SIGNIFICANT CHANGE UP (ref 0–1.5)
INR BLD: 1.05 — SIGNIFICANT CHANGE UP (ref 0.88–1.16)
LYMPHOCYTES # BLD AUTO: 1.48 K/UL — SIGNIFICANT CHANGE UP (ref 1–3.3)
LYMPHOCYTES # BLD AUTO: 27.3 % — SIGNIFICANT CHANGE UP (ref 13–44)
MCHC RBC-ENTMCNC: 30.1 PG — SIGNIFICANT CHANGE UP (ref 27–34)
MCHC RBC-ENTMCNC: 32.5 GM/DL — SIGNIFICANT CHANGE UP (ref 32–36)
MCV RBC AUTO: 92.6 FL — SIGNIFICANT CHANGE UP (ref 80–100)
MONOCYTES # BLD AUTO: 0.39 K/UL — SIGNIFICANT CHANGE UP (ref 0–0.9)
MONOCYTES NFR BLD AUTO: 7.2 % — SIGNIFICANT CHANGE UP (ref 2–14)
NEUTROPHILS # BLD AUTO: 3.35 K/UL — SIGNIFICANT CHANGE UP (ref 1.8–7.4)
NEUTROPHILS NFR BLD AUTO: 61.6 % — SIGNIFICANT CHANGE UP (ref 43–77)
NRBC # BLD: 0 /100 WBCS — SIGNIFICANT CHANGE UP (ref 0–0)
PLATELET # BLD AUTO: 326 K/UL — SIGNIFICANT CHANGE UP (ref 150–400)
POTASSIUM SERPL-MCNC: 3.8 MMOL/L — SIGNIFICANT CHANGE UP (ref 3.5–5.3)
POTASSIUM SERPL-SCNC: 3.8 MMOL/L — SIGNIFICANT CHANGE UP (ref 3.5–5.3)
PROT SERPL-MCNC: 6.9 G/DL — SIGNIFICANT CHANGE UP (ref 6–8.3)
PROTHROM AB SERPL-ACNC: 12.5 SEC — SIGNIFICANT CHANGE UP (ref 10.5–13.4)
RBC # BLD: 4.75 M/UL — SIGNIFICANT CHANGE UP (ref 3.8–5.2)
RBC # FLD: 13 % — SIGNIFICANT CHANGE UP (ref 10.3–14.5)
SODIUM SERPL-SCNC: 138 MMOL/L — SIGNIFICANT CHANGE UP (ref 135–145)
WBC # BLD: 5.43 K/UL — SIGNIFICANT CHANGE UP (ref 3.8–10.5)
WBC # FLD AUTO: 5.43 K/UL — SIGNIFICANT CHANGE UP (ref 3.8–10.5)

## 2022-03-21 PROCEDURE — 85025 COMPLETE CBC W/AUTO DIFF WBC: CPT

## 2022-03-21 PROCEDURE — 36415 COLL VENOUS BLD VENIPUNCTURE: CPT

## 2022-03-21 PROCEDURE — 80053 COMPREHEN METABOLIC PANEL: CPT

## 2022-03-21 PROCEDURE — 86900 BLOOD TYPING SEROLOGIC ABO: CPT

## 2022-03-21 PROCEDURE — 85610 PROTHROMBIN TIME: CPT

## 2022-03-21 PROCEDURE — 86901 BLOOD TYPING SEROLOGIC RH(D): CPT

## 2022-03-21 PROCEDURE — 85730 THROMBOPLASTIN TIME PARTIAL: CPT

## 2022-03-21 PROCEDURE — 86850 RBC ANTIBODY SCREEN: CPT

## 2022-03-22 ENCOUNTER — FORM ENCOUNTER (OUTPATIENT)
Age: 52
End: 2022-03-22

## 2022-03-22 VITALS
TEMPERATURE: 97 F | OXYGEN SATURATION: 100 % | HEIGHT: 65 IN | DIASTOLIC BLOOD PRESSURE: 76 MMHG | SYSTOLIC BLOOD PRESSURE: 124 MMHG | WEIGHT: 156.97 LBS | RESPIRATION RATE: 16 BRPM | HEART RATE: 59 BPM

## 2022-03-22 NOTE — PRE-OP CHECKLIST - ASSESSMENT, HISTORY & PHYSICAL COMPLETED AND ON MEDICAL RECORD
Patient is a 2 day old male infant, delivered at Gestational Age: 39w0d , Low Transverse at 7:51 AM on 2020.    [place]     is currently being fed Formula only.  I am aware that mother's feeding choice upon admission was the following:    Information for the patient's mother:  Naty Hilliard \"Kelsy\" [[id number]]   Formula feeding only   There are no medical contraindications to exclusive breast milk feeding, and the benefits of exclusively breastfeeding were discussed/reinforced with the mother.    Feeding Tolerance:  Tolerates well (20 0530)    Urine Occurrence: 1 (20 1030)  Stool Occurrence: 1 (20 1930)    Hearing Exam:   Hearing Test Machine: Auditory Brainstem Response (Algo) (20 0945)  [place] Hearing Test Results: Pass R;Pass L (20 0945)    Congenital Heart Disease Screening:  Congenital Heart Disease Screening Result: Normal (20 0856)    State screen done, results pending.    Immunizations   Most Recent Immunizations   Administered Date(s) Administered   • Hep B, adolescent or pediatric 2020   Pended Date(s) Pended   • Hepatitis B Immune Globulin 2020       Transcutaneous Bilirubin Result:  6.6 (20 0854)  Hours of age-Transcutaneous Biliribin: 25 Hrs    PHYSICAL EXAM    VITALS:    Visit Vitals  Pulse 150   Temp 98.2 °F (36.8 °C) (Axillary)   Resp 48   Ht 19\" (48.3 cm)   Wt 3.02 kg Comment: 6-11   HC 36 cm (14.17\")   BMI 12.97 kg/m²       Weight change since birth:  -6%    General:  Patient is an alert, vigorous male with appropriate behavior.  He is in no acute distress.  Skin:  His skin is warm with normal turgor.  The color of the skin is pink.  There is no rash.  There are no bruises or other signs of injury.  No significant jaundice.  Head:  The head is atraumatic and normocephalic.  The anterior fontanel is open and flat; the posterior fontanel is open.  Eyes:  The conjunctivae appear normal with neither icterus nor  Emil Palomo MD   2020       done

## 2022-03-22 NOTE — PRE-OP CHECKLIST - SELECT TESTS ORDERED
CMP/PT/PTT/INR/Type and Screen/EKG/COVID-19 CBC/CMP/PT/PTT/INR/Type and Screen/EKG/COVID-19 HCG Negative/CBC/CMP/PT/PTT/INR/Type and Screen/HCG/EKG/POCT Blood Glucose/COVID-19

## 2022-03-22 NOTE — PATIENT PROFILE ADULT - NSTRANSFERBELONGINGSDISPO_GEN_A_NUR
[facility]  DISCHARGE SUMMARY    Patient Name: Souleymane Padilla  : 1949  MRN: [id number]    [facility] with PCP/primary neurologist Brock Landeros MD    PCP Phone Number:    [phone]     given to security

## 2022-03-22 NOTE — PATIENT PROFILE ADULT - FALL HARM RISK - UNIVERSAL INTERVENTIONS
Bed in lowest position, wheels locked, appropriate side rails in place/Call bell, personal items and telephone in reach/Instruct patient to call for assistance before getting out of bed or chair/Non-slip footwear when patient is out of bed/Tow to call system/Physically safe environment - no spills, clutter or unnecessary equipment/Purposeful Proactive Rounding/Room/bathroom lighting operational, light cord in reach

## 2022-03-23 ENCOUNTER — APPOINTMENT (OUTPATIENT)
Dept: CARDIOTHORACIC SURGERY | Facility: HOSPITAL | Age: 52
End: 2022-03-23

## 2022-03-23 ENCOUNTER — INPATIENT (INPATIENT)
Facility: HOSPITAL | Age: 52
LOS: 0 days | Discharge: HOME | DRG: 274 | End: 2022-03-24
Attending: INTERNAL MEDICINE | Admitting: INTERNAL MEDICINE
Payer: MEDICAID

## 2022-03-23 LAB
A1C WITH ESTIMATED AVERAGE GLUCOSE RESULT: 5.3 % — SIGNIFICANT CHANGE UP (ref 4–5.6)
ALBUMIN SERPL ELPH-MCNC: 4.1 G/DL — SIGNIFICANT CHANGE UP (ref 3.3–5)
ALBUMIN SERPL ELPH-MCNC: 4.3 G/DL — SIGNIFICANT CHANGE UP (ref 3.3–5)
ALP SERPL-CCNC: 54 U/L — SIGNIFICANT CHANGE UP (ref 40–120)
ALP SERPL-CCNC: 57 U/L — SIGNIFICANT CHANGE UP (ref 40–120)
ALT FLD-CCNC: 8 U/L — LOW (ref 10–45)
ALT FLD-CCNC: 9 U/L — LOW (ref 10–45)
ANION GAP SERPL CALC-SCNC: 13 MMOL/L — SIGNIFICANT CHANGE UP (ref 5–17)
ANION GAP SERPL CALC-SCNC: 13 MMOL/L — SIGNIFICANT CHANGE UP (ref 5–17)
APTT BLD: 29.6 SEC — SIGNIFICANT CHANGE UP (ref 27.5–35.5)
APTT BLD: 32.1 SEC — SIGNIFICANT CHANGE UP (ref 27.5–35.5)
AST SERPL-CCNC: 15 U/L — SIGNIFICANT CHANGE UP (ref 10–40)
AST SERPL-CCNC: 16 U/L — SIGNIFICANT CHANGE UP (ref 10–40)
BILIRUB SERPL-MCNC: 0.5 MG/DL — SIGNIFICANT CHANGE UP (ref 0.2–1.2)
BILIRUB SERPL-MCNC: 0.5 MG/DL — SIGNIFICANT CHANGE UP (ref 0.2–1.2)
BLD GP AB SCN SERPL QL: NEGATIVE — SIGNIFICANT CHANGE UP
BUN SERPL-MCNC: 12 MG/DL — SIGNIFICANT CHANGE UP (ref 7–23)
BUN SERPL-MCNC: 8 MG/DL — SIGNIFICANT CHANGE UP (ref 7–23)
CALCIUM SERPL-MCNC: 9.1 MG/DL — SIGNIFICANT CHANGE UP (ref 8.4–10.5)
CALCIUM SERPL-MCNC: 9.1 MG/DL — SIGNIFICANT CHANGE UP (ref 8.4–10.5)
CHLORIDE SERPL-SCNC: 104 MMOL/L — SIGNIFICANT CHANGE UP (ref 96–108)
CHLORIDE SERPL-SCNC: 105 MMOL/L — SIGNIFICANT CHANGE UP (ref 96–108)
CHOLEST SERPL-MCNC: 114 MG/DL — SIGNIFICANT CHANGE UP
CO2 SERPL-SCNC: 22 MMOL/L — SIGNIFICANT CHANGE UP (ref 22–31)
CO2 SERPL-SCNC: 25 MMOL/L — SIGNIFICANT CHANGE UP (ref 22–31)
CREAT SERPL-MCNC: 0.84 MG/DL — SIGNIFICANT CHANGE UP (ref 0.5–1.3)
CREAT SERPL-MCNC: 0.9 MG/DL — SIGNIFICANT CHANGE UP (ref 0.5–1.3)
EGFR: 77 ML/MIN/1.73M2 — SIGNIFICANT CHANGE UP
EGFR: 84 ML/MIN/1.73M2 — SIGNIFICANT CHANGE UP
ESTIMATED AVERAGE GLUCOSE: 105 MG/DL — SIGNIFICANT CHANGE UP (ref 68–114)
GLUCOSE BLDC GLUCOMTR-MCNC: 119 MG/DL — HIGH (ref 70–99)
GLUCOSE SERPL-MCNC: 103 MG/DL — HIGH (ref 70–99)
GLUCOSE SERPL-MCNC: 129 MG/DL — HIGH (ref 70–99)
HCT VFR BLD CALC: 40.8 % — SIGNIFICANT CHANGE UP (ref 34.5–45)
HCT VFR BLD CALC: 41.4 % — SIGNIFICANT CHANGE UP (ref 34.5–45)
HDLC SERPL-MCNC: 49 MG/DL — LOW
HGB BLD-MCNC: 13.4 G/DL — SIGNIFICANT CHANGE UP (ref 11.5–15.5)
HGB BLD-MCNC: 13.5 G/DL — SIGNIFICANT CHANGE UP (ref 11.5–15.5)
INR BLD: 1.08 — SIGNIFICANT CHANGE UP (ref 0.88–1.16)
INR BLD: 1.12 — SIGNIFICANT CHANGE UP (ref 0.88–1.16)
LIPID PNL WITH DIRECT LDL SERPL: 52 MG/DL — SIGNIFICANT CHANGE UP
MAGNESIUM SERPL-MCNC: 2 MG/DL — SIGNIFICANT CHANGE UP (ref 1.6–2.6)
MAGNESIUM SERPL-MCNC: 2 MG/DL — SIGNIFICANT CHANGE UP (ref 1.6–2.6)
MCHC RBC-ENTMCNC: 29.6 PG — SIGNIFICANT CHANGE UP (ref 27–34)
MCHC RBC-ENTMCNC: 30 PG — SIGNIFICANT CHANGE UP (ref 27–34)
MCHC RBC-ENTMCNC: 32.4 GM/DL — SIGNIFICANT CHANGE UP (ref 32–36)
MCHC RBC-ENTMCNC: 33.1 GM/DL — SIGNIFICANT CHANGE UP (ref 32–36)
MCV RBC AUTO: 90.7 FL — SIGNIFICANT CHANGE UP (ref 80–100)
MCV RBC AUTO: 91.4 FL — SIGNIFICANT CHANGE UP (ref 80–100)
NON HDL CHOLESTEROL: 65 MG/DL — SIGNIFICANT CHANGE UP
NRBC # BLD: 0 /100 WBCS — SIGNIFICANT CHANGE UP (ref 0–0)
NRBC # BLD: 0 /100 WBCS — SIGNIFICANT CHANGE UP (ref 0–0)
NT-PROBNP SERPL-SCNC: 23 PG/ML — SIGNIFICANT CHANGE UP (ref 0–300)
PHOSPHATE SERPL-MCNC: 3.7 MG/DL — SIGNIFICANT CHANGE UP (ref 2.5–4.5)
PHOSPHATE SERPL-MCNC: 3.9 MG/DL — SIGNIFICANT CHANGE UP (ref 2.5–4.5)
PLATELET # BLD AUTO: 287 K/UL — SIGNIFICANT CHANGE UP (ref 150–400)
PLATELET # BLD AUTO: 290 K/UL — SIGNIFICANT CHANGE UP (ref 150–400)
POTASSIUM SERPL-MCNC: 3.5 MMOL/L — SIGNIFICANT CHANGE UP (ref 3.5–5.3)
POTASSIUM SERPL-MCNC: 4 MMOL/L — SIGNIFICANT CHANGE UP (ref 3.5–5.3)
POTASSIUM SERPL-SCNC: 3.5 MMOL/L — SIGNIFICANT CHANGE UP (ref 3.5–5.3)
POTASSIUM SERPL-SCNC: 4 MMOL/L — SIGNIFICANT CHANGE UP (ref 3.5–5.3)
PROT SERPL-MCNC: 6.6 G/DL — SIGNIFICANT CHANGE UP (ref 6–8.3)
PROT SERPL-MCNC: 6.9 G/DL — SIGNIFICANT CHANGE UP (ref 6–8.3)
PROTHROM AB SERPL-ACNC: 12.9 SEC — SIGNIFICANT CHANGE UP (ref 10.5–13.4)
PROTHROM AB SERPL-ACNC: 13.4 SEC — SIGNIFICANT CHANGE UP (ref 10.5–13.4)
RBC # BLD: 4.5 M/UL — SIGNIFICANT CHANGE UP (ref 3.8–5.2)
RBC # BLD: 4.53 M/UL — SIGNIFICANT CHANGE UP (ref 3.8–5.2)
RBC # FLD: 12.8 % — SIGNIFICANT CHANGE UP (ref 10.3–14.5)
RBC # FLD: 13.1 % — SIGNIFICANT CHANGE UP (ref 10.3–14.5)
RH IG SCN BLD-IMP: POSITIVE — SIGNIFICANT CHANGE UP
SODIUM SERPL-SCNC: 139 MMOL/L — SIGNIFICANT CHANGE UP (ref 135–145)
SODIUM SERPL-SCNC: 143 MMOL/L — SIGNIFICANT CHANGE UP (ref 135–145)
TRIGL SERPL-MCNC: 67 MG/DL — SIGNIFICANT CHANGE UP
TSH SERPL-MCNC: 2.27 UIU/ML — SIGNIFICANT CHANGE UP (ref 0.27–4.2)
WBC # BLD: 6.97 K/UL — SIGNIFICANT CHANGE UP (ref 3.8–10.5)
WBC # BLD: 7.82 K/UL — SIGNIFICANT CHANGE UP (ref 3.8–10.5)
WBC # FLD AUTO: 6.97 K/UL — SIGNIFICANT CHANGE UP (ref 3.8–10.5)
WBC # FLD AUTO: 7.82 K/UL — SIGNIFICANT CHANGE UP (ref 3.8–10.5)

## 2022-03-23 PROCEDURE — 93662 INTRACARDIAC ECG (ICE): CPT | Mod: 26

## 2022-03-23 PROCEDURE — 70450 CT HEAD/BRAIN W/O DYE: CPT | Mod: 26

## 2022-03-23 PROCEDURE — 93010 ELECTROCARDIOGRAM REPORT: CPT

## 2022-03-23 PROCEDURE — 99223 1ST HOSP IP/OBS HIGH 75: CPT | Mod: 25

## 2022-03-23 PROCEDURE — 71045 X-RAY EXAM CHEST 1 VIEW: CPT | Mod: 26

## 2022-03-23 PROCEDURE — 93580 TRANSCATH CLOSURE OF ASD: CPT

## 2022-03-23 DEVICE — IMPLANTABLE DEVICE: Type: IMPLANTABLE DEVICE | Status: FUNCTIONAL

## 2022-03-23 DEVICE — CATH BALLOON WEDGE PRESURE 7FR X 110CM: Type: IMPLANTABLE DEVICE | Status: FUNCTIONAL

## 2022-03-23 DEVICE — CATH DX MPA2 INFIN 5FRX100CM: Type: IMPLANTABLE DEVICE | Status: FUNCTIONAL

## 2022-03-23 DEVICE — OCCLUDER PFO TALISMAN 30MM: Type: IMPLANTABLE DEVICE | Status: FUNCTIONAL

## 2022-03-23 DEVICE — GUIDEWIRE TERUMO GLIDEWIRE STIFF STRAGHT .035" X 180CM: Type: IMPLANTABLE DEVICE | Status: FUNCTIONAL

## 2022-03-23 DEVICE — CATH ACUNAV 8FX90CM: Type: IMPLANTABLE DEVICE | Status: FUNCTIONAL

## 2022-03-23 DEVICE — GWIRE AMPLATZER .035 X 260CM: Type: IMPLANTABLE DEVICE | Status: FUNCTIONAL

## 2022-03-23 DEVICE — AMPLATZER VASCULAR PLUG II AVP II 12MM X 9MM: Type: IMPLANTABLE DEVICE | Status: FUNCTIONAL

## 2022-03-23 DEVICE — WIRE GD BMW UNIV II 190CM: Type: IMPLANTABLE DEVICE | Status: FUNCTIONAL

## 2022-03-23 DEVICE — AMPLATZER VASCULAR PLUG II AVP II 14MM X 10MM: Type: IMPLANTABLE DEVICE | Status: FUNCTIONAL

## 2022-03-23 DEVICE — CATH DX IM INFIN 5FRX100CM: Type: IMPLANTABLE DEVICE | Status: FUNCTIONAL

## 2022-03-23 DEVICE — SYS DEL TREVISIO 9FRX80MM: Type: IMPLANTABLE DEVICE | Status: FUNCTIONAL

## 2022-03-23 DEVICE — SHEATH INTRODUCER TERUMO PINNACLE CORONARY 8FR X 10CM X 0.038" MINI WIRE: Type: IMPLANTABLE DEVICE | Status: FUNCTIONAL

## 2022-03-23 DEVICE — BLLN SZ 30CC AMPLATZER 24: Type: IMPLANTABLE DEVICE | Status: FUNCTIONAL

## 2022-03-23 DEVICE — SHEATH INTRODUCER TERUMO PINNACLE CORONARY 7FR X 10CM X 0.038" MINI WIRE: Type: IMPLANTABLE DEVICE | Status: FUNCTIONAL

## 2022-03-23 DEVICE — GWIRE GUID  0.035INX150CM: Type: IMPLANTABLE DEVICE | Status: FUNCTIONAL

## 2022-03-23 DEVICE — CATH DX JR4 INFIN 5FRX100CM: Type: IMPLANTABLE DEVICE | Status: FUNCTIONAL

## 2022-03-23 DEVICE — SHEATH INTRODUCER TERUMO PINNACLE CORONARY 5FR X 10CM X 0.038" MINI WIRE: Type: IMPLANTABLE DEVICE | Status: FUNCTIONAL

## 2022-03-23 DEVICE — INTRO GWIRE 0.009-0.018IN: Type: IMPLANTABLE DEVICE | Status: FUNCTIONAL

## 2022-03-23 DEVICE — INTRO MICROPUNC 4FRX10CM SS: Type: IMPLANTABLE DEVICE | Status: FUNCTIONAL

## 2022-03-23 DEVICE — SHEATH INTRODUCER TERUMO PINNACLE CORONARY 9FR X 25CM: Type: IMPLANTABLE DEVICE | Status: FUNCTIONAL

## 2022-03-23 DEVICE — GUIDEWIRE STANDARD STRAIGHT .035" X 180CM: Type: IMPLANTABLE DEVICE | Status: FUNCTIONAL

## 2022-03-23 DEVICE — SUT PERCLOSE PROGLIDE 6FR: Type: IMPLANTABLE DEVICE | Status: FUNCTIONAL

## 2022-03-23 RX ORDER — PANTOPRAZOLE SODIUM 20 MG/1
40 TABLET, DELAYED RELEASE ORAL
Refills: 0 | Status: ACTIVE | OUTPATIENT
Start: 2022-03-23 | End: 2023-02-19

## 2022-03-23 RX ORDER — CEFAZOLIN SODIUM 1 G
2000 VIAL (EA) INJECTION EVERY 8 HOURS
Refills: 0 | Status: ACTIVE | OUTPATIENT
Start: 2022-03-23 | End: 2022-03-24

## 2022-03-23 RX ORDER — SENNA PLUS 8.6 MG/1
2 TABLET ORAL AT BEDTIME
Refills: 0 | Status: ACTIVE | OUTPATIENT
Start: 2022-03-23 | End: 2023-02-19

## 2022-03-23 RX ORDER — KETOROLAC TROMETHAMINE 30 MG/ML
15 SYRINGE (ML) INJECTION ONCE
Refills: 0 | Status: DISCONTINUED | OUTPATIENT
Start: 2022-03-23 | End: 2022-03-23

## 2022-03-23 RX ORDER — CLOPIDOGREL BISULFATE 75 MG/1
75 TABLET, FILM COATED ORAL DAILY
Refills: 0 | Status: ACTIVE | OUTPATIENT
Start: 2022-03-23 | End: 2023-02-19

## 2022-03-23 RX ORDER — ATORVASTATIN CALCIUM 80 MG/1
40 TABLET, FILM COATED ORAL AT BEDTIME
Refills: 0 | Status: ACTIVE | OUTPATIENT
Start: 2022-03-23 | End: 2023-02-19

## 2022-03-23 RX ORDER — ASPIRIN/CALCIUM CARB/MAGNESIUM 324 MG
81 TABLET ORAL DAILY
Refills: 0 | Status: ACTIVE | OUTPATIENT
Start: 2022-03-23 | End: 2023-02-19

## 2022-03-23 RX ORDER — SODIUM CHLORIDE 9 MG/ML
3 INJECTION INTRAMUSCULAR; INTRAVENOUS; SUBCUTANEOUS EVERY 8 HOURS
Refills: 0 | Status: ACTIVE | OUTPATIENT
Start: 2022-03-23 | End: 2023-02-19

## 2022-03-23 RX ORDER — SODIUM CHLORIDE 9 MG/ML
500 INJECTION, SOLUTION INTRAVENOUS
Refills: 0 | Status: ACTIVE | OUTPATIENT
Start: 2022-03-23 | End: 2023-02-19

## 2022-03-23 RX ORDER — SODIUM CHLORIDE 9 MG/ML
1000 INJECTION, SOLUTION INTRAVENOUS
Refills: 0 | Status: DISCONTINUED | OUTPATIENT
Start: 2022-03-23 | End: 2022-03-23

## 2022-03-23 RX ORDER — ACETAMINOPHEN 500 MG
650 TABLET ORAL EVERY 6 HOURS
Refills: 0 | Status: ACTIVE | OUTPATIENT
Start: 2022-03-23 | End: 2023-02-19

## 2022-03-23 RX ORDER — HEPARIN SODIUM 5000 [USP'U]/ML
5000 INJECTION INTRAVENOUS; SUBCUTANEOUS EVERY 8 HOURS
Refills: 0 | Status: ACTIVE | OUTPATIENT
Start: 2022-03-23 | End: 2023-02-19

## 2022-03-23 RX ADMIN — Medication 81 MILLIGRAM(S): at 18:50

## 2022-03-23 RX ADMIN — Medication 100 MILLIGRAM(S): at 14:36

## 2022-03-23 RX ADMIN — SODIUM CHLORIDE 100 MILLILITER(S): 9 INJECTION, SOLUTION INTRAVENOUS at 07:00

## 2022-03-23 RX ADMIN — HEPARIN SODIUM 5000 UNIT(S): 5000 INJECTION INTRAVENOUS; SUBCUTANEOUS at 22:24

## 2022-03-23 RX ADMIN — SODIUM CHLORIDE 3 MILLILITER(S): 9 INJECTION INTRAMUSCULAR; INTRAVENOUS; SUBCUTANEOUS at 14:32

## 2022-03-23 RX ADMIN — CLOPIDOGREL BISULFATE 75 MILLIGRAM(S): 75 TABLET, FILM COATED ORAL at 18:50

## 2022-03-23 RX ADMIN — SODIUM CHLORIDE 3 MILLILITER(S): 9 INJECTION INTRAMUSCULAR; INTRAVENOUS; SUBCUTANEOUS at 22:26

## 2022-03-23 RX ADMIN — Medication 15 MILLIGRAM(S): at 14:55

## 2022-03-23 RX ADMIN — ATORVASTATIN CALCIUM 40 MILLIGRAM(S): 80 TABLET, FILM COATED ORAL at 22:24

## 2022-03-23 RX ADMIN — Medication 100 MILLIGRAM(S): at 22:25

## 2022-03-23 RX ADMIN — Medication 15 MILLIGRAM(S): at 15:30

## 2022-03-23 NOTE — H&P ADULT - HISTORY OF PRESENT ILLNESS
51 female, former smoker with a PMHx of superior cerebellum/peduncle and left occipital stroke, loop recorder placement, pfo who presented to hospital for surgery.   Pt. with no new neurologic events since last visit. She denies SOB, CP, dizziness and syncope upon arrival.  Pt. with fecal occult + and was cleared by GI.  Upon putting her tourniquet for blood draw she syncopized.  Her BP was 126/76  HR 44 .  She is being admitted today for IVF hydrations, CT head and neurology consults.

## 2022-03-23 NOTE — PROGRESS NOTE ADULT - ASSESSMENT
51 year old F, former smoker, PMHx of superior cerebellum/peduncle and left occipital stroke, loop recorder placement, pfo who presented to hospital for surgery.   Pt. with no new neurologic events since last visit. Pt. with fecal occult + and was cleared by GI.  Upon putting her tourniquet for blood draw she synopsized.  Her BP was 126/76  HR 44 , CTH unchanged and neurology consulted and cleared patient for PFO closure. Today she underwent PFO closure. Ar[facility] to [facility] as mini ICU stable on no gtts.     A/P:  Neurovascular: No delirium. Pain well controlled with current regimen.  -PRN's.  -Hx CVA, syncope pre op, Neuro consulted CTH negative, and cleared for PFO closure- post op appropriate with no signs of weakness or deficit.     Cardiovascular: Hemodynamically stable. HR controlled.  -Hx PFO now s/p closure  -continue asa/plavix/statin  -echo tomorrow   -ekg    Respiratory: 02 Sat = 98% on RA.  -Encourage ambulation, C+DB and Use of IS 10x / hr while awake.  -CXR    GI: Stable. Hx +FOBT, cleared by GI for sx today   -protonix for GI PPX.  -Continue bowel regimen  -ADAT    Renal / : BUN/Cr 12/0.90  -Monitor renal function.  -Monitor I/O's.  -Replete lytes PRN    Endocrine:    -A1c 5.3  -TSHwnl    Hematologic: f/u H&H post op  -f/u AM CBC    ID: Afebrile, WBC pending   -periop abx  -Observe for SIRS/Sepsis Syndrome.    Prophylaxis:  -DVT prophylaxis with 5000 SubQ Heparin q8h.  -SCD's    Disposition:  -Home likely tomorrow

## 2022-03-23 NOTE — H&P ADULT - NSHPPHYSICALEXAM_GEN_ALL_CORE
Appearance: No acute distress.  Neurologic: AAOx3, no AMS or focal deficits.  Responds appropriately to verbal and physical stimuli; exhibits purposeful movement in all extremities.  HEENT:   MMM, PERRLA, EOMI	b/l  Neck: Supple, - JVD; - Carotid Bruit   Cardiovascular: RRR, S1 S2. No m/r/g.  Respiratory: No acute respiratory distress. CTA b/l, no w/r/r.   Gastrointestinal:  Soft, non-tender, non-distended, + BS.	  Skin: No rashes. No ecchymoses. No cyanosis.  Extremities: Exhibits normal range of motion, no clubbing, cyanosis or edema.  Vascular: Peripheral pulses palpable 2+ bilaterally.

## 2022-03-23 NOTE — H&P ADULT - NSHPSOCIALHISTORY_GEN_ALL_CORE
Social History  Smoker:  former smoker     Pack Years:       When Quit:  ETOH Use:  NO     Illicit Drug Use:  NO  Occupation: dance instructor  Assistant Devices:   None   Lives with: alone, independent with ADLs

## 2022-03-23 NOTE — PROVIDER CONTACT NOTE (CHANGE IN STATUS NOTIFICATION) - BACKGROUND
Patient is preop for PFO closure. RN was tryingto place an IV and tied torniquet tight. Patient felt dizzy and then syncopized. Placed on monitor; HR was 44 and /57. When she woke up she felt slightly nauseous, was diaphoreticand had some tingling in her rt hand. Patient has history of stroke. Rapid response called.

## 2022-03-23 NOTE — BRIEF OPERATIVE NOTE - NSICDXBRIEFPROCEDURE_GEN_ALL_CORE_FT
PROCEDURES:  Endovascular repair of patent foramen ovale (PFO) 23-Mar-2022 13:31:19 transcatheter PFO closure Jagdeep Zavala

## 2022-03-23 NOTE — H&P ADULT - NSHPREVIEWOFSYSTEMS_GEN_ALL_CORE
Review of Systems  CONSTITUTIONAL:  Denies Fevers / chills, sweats, fatigue, weight loss, weight gain                                      NEURO:  + stroke, + syncome this AM Denies parathesias, seizures, confusion                                                                                EYES:  Denies Blurry vision, discharge, pain, loss of vision                                                                                    ENMT:  Denies Difficulty hearing, vertigo, dysphagia, epistaxis, recent dental work                                       CV:  Denies Chest pain, palpitations, BRITO, orthopnea                                                                                          RESPIRATORY:  Denies Wheezing, SOB, cough / sputum, hemoptysis                                                                GI:  Denies Nausea, vomiting, diarrhea, constipation, melena, difficulty swallowing                                               : Denies Hematuria, dysuria, urgency, incontinence                                                                                         MUSCULOSKELETAL:  Denies arthritis, joint swelling, muscle weakness                                                             SKIN/BREAST:  Denies rash, itching, hair loss, masses                                                                                            PSYCH:  Denies depression, anxiety, suicidal ideation                                                                                               HEME/LYMPH:  Denies bruises easily, enlarged lymph nodes, tender lymph nodes                                        ENDOCRINE:  Denies cold intolerance, heat intolerance, polydipsia

## 2022-03-23 NOTE — H&P ADULT - ASSESSMENT
51 female, former smoker with a PMHx of superior cerebellum/peduncle and left occipital stroke, loop recorder placement, pfo who presented to hospital for surgery.   Pt. with no new neurologic events since last visit.  She denies SOB, CP, dizziness and syncope upon arrival.  Pt. with fecal occult + and was cleared by GI.  Upon putting her tourniquet for blood draw she syncopized.  Her BP was 126/76  HR 44 RR 16  .  She is being admitted today for IVF hydrations, CT head and neurology consults.    Admit to [facility]  D5NS @ 100cc.hr  CT headf non contrast  Neurology consult - Dr. Alarcon to see patient

## 2022-03-24 ENCOUNTER — TRANSCRIPTION ENCOUNTER (OUTPATIENT)
Age: 52
End: 2022-03-24

## 2022-03-24 VITALS
RESPIRATION RATE: 18 BRPM | OXYGEN SATURATION: 100 % | DIASTOLIC BLOOD PRESSURE: 87 MMHG | SYSTOLIC BLOOD PRESSURE: 123 MMHG | HEART RATE: 58 BPM

## 2022-03-24 LAB
ANION GAP SERPL CALC-SCNC: 9 MMOL/L — SIGNIFICANT CHANGE UP (ref 5–17)
APTT BLD: 29.9 SEC — SIGNIFICANT CHANGE UP (ref 27.5–35.5)
BUN SERPL-MCNC: 10 MG/DL — SIGNIFICANT CHANGE UP (ref 7–23)
CALCIUM SERPL-MCNC: 8.9 MG/DL — SIGNIFICANT CHANGE UP (ref 8.4–10.5)
CHLORIDE SERPL-SCNC: 104 MMOL/L — SIGNIFICANT CHANGE UP (ref 96–108)
CO2 SERPL-SCNC: 25 MMOL/L — SIGNIFICANT CHANGE UP (ref 22–31)
CREAT SERPL-MCNC: 0.97 MG/DL — SIGNIFICANT CHANGE UP (ref 0.5–1.3)
EGFR: 71 ML/MIN/1.73M2 — SIGNIFICANT CHANGE UP
GLUCOSE SERPL-MCNC: 93 MG/DL — SIGNIFICANT CHANGE UP (ref 70–99)
HCT VFR BLD CALC: 39.3 % — SIGNIFICANT CHANGE UP (ref 34.5–45)
HGB BLD-MCNC: 12.8 G/DL — SIGNIFICANT CHANGE UP (ref 11.5–15.5)
INR BLD: 1.12 — SIGNIFICANT CHANGE UP (ref 0.88–1.16)
MAGNESIUM SERPL-MCNC: 2 MG/DL — SIGNIFICANT CHANGE UP (ref 1.6–2.6)
MCHC RBC-ENTMCNC: 29.4 PG — SIGNIFICANT CHANGE UP (ref 27–34)
MCHC RBC-ENTMCNC: 32.6 GM/DL — SIGNIFICANT CHANGE UP (ref 32–36)
MCV RBC AUTO: 90.1 FL — SIGNIFICANT CHANGE UP (ref 80–100)
NRBC # BLD: 0 /100 WBCS — SIGNIFICANT CHANGE UP (ref 0–0)
PHOSPHATE SERPL-MCNC: 4.6 MG/DL — HIGH (ref 2.5–4.5)
PLATELET # BLD AUTO: 252 K/UL — SIGNIFICANT CHANGE UP (ref 150–400)
POTASSIUM SERPL-MCNC: 4.7 MMOL/L — SIGNIFICANT CHANGE UP (ref 3.5–5.3)
POTASSIUM SERPL-SCNC: 4.7 MMOL/L — SIGNIFICANT CHANGE UP (ref 3.5–5.3)
PROTHROM AB SERPL-ACNC: 13.3 SEC — SIGNIFICANT CHANGE UP (ref 10.5–13.4)
RBC # BLD: 4.36 M/UL — SIGNIFICANT CHANGE UP (ref 3.8–5.2)
RBC # FLD: 12.9 % — SIGNIFICANT CHANGE UP (ref 10.3–14.5)
SODIUM SERPL-SCNC: 138 MMOL/L — SIGNIFICANT CHANGE UP (ref 135–145)
WBC # BLD: 6.01 K/UL — SIGNIFICANT CHANGE UP (ref 3.8–10.5)
WBC # FLD AUTO: 6.01 K/UL — SIGNIFICANT CHANGE UP (ref 3.8–10.5)

## 2022-03-24 PROCEDURE — 71045 X-RAY EXAM CHEST 1 VIEW: CPT | Mod: 26

## 2022-03-24 PROCEDURE — 83880 ASSAY OF NATRIURETIC PEPTIDE: CPT

## 2022-03-24 PROCEDURE — C1889: CPT

## 2022-03-24 PROCEDURE — 83036 HEMOGLOBIN GLYCOSYLATED A1C: CPT

## 2022-03-24 PROCEDURE — 85730 THROMBOPLASTIN TIME PARTIAL: CPT

## 2022-03-24 PROCEDURE — 85027 COMPLETE CBC AUTOMATED: CPT

## 2022-03-24 PROCEDURE — 83735 ASSAY OF MAGNESIUM: CPT

## 2022-03-24 PROCEDURE — 84100 ASSAY OF PHOSPHORUS: CPT

## 2022-03-24 PROCEDURE — C1887: CPT

## 2022-03-24 PROCEDURE — 80061 LIPID PANEL: CPT

## 2022-03-24 PROCEDURE — 82962 GLUCOSE BLOOD TEST: CPT

## 2022-03-24 PROCEDURE — 36415 COLL VENOUS BLD VENIPUNCTURE: CPT

## 2022-03-24 PROCEDURE — 93306 TTE W/DOPPLER COMPLETE: CPT

## 2022-03-24 PROCEDURE — C1894: CPT

## 2022-03-24 PROCEDURE — 85610 PROTHROMBIN TIME: CPT

## 2022-03-24 PROCEDURE — 71045 X-RAY EXAM CHEST 1 VIEW: CPT

## 2022-03-24 PROCEDURE — C1769: CPT

## 2022-03-24 PROCEDURE — 93306 TTE W/DOPPLER COMPLETE: CPT | Mod: 26

## 2022-03-24 PROCEDURE — 70450 CT HEAD/BRAIN W/O DYE: CPT

## 2022-03-24 PROCEDURE — 84443 ASSAY THYROID STIM HORMONE: CPT

## 2022-03-24 PROCEDURE — 86901 BLOOD TYPING SEROLOGIC RH(D): CPT

## 2022-03-24 PROCEDURE — 86850 RBC ANTIBODY SCREEN: CPT

## 2022-03-24 PROCEDURE — 93010 ELECTROCARDIOGRAM REPORT: CPT

## 2022-03-24 PROCEDURE — 80048 BASIC METABOLIC PNL TOTAL CA: CPT

## 2022-03-24 PROCEDURE — 93005 ELECTROCARDIOGRAM TRACING: CPT

## 2022-03-24 PROCEDURE — 80053 COMPREHEN METABOLIC PANEL: CPT

## 2022-03-24 PROCEDURE — C1759: CPT

## 2022-03-24 PROCEDURE — 86900 BLOOD TYPING SEROLOGIC ABO: CPT

## 2022-03-24 RX ORDER — RIMEGEPANT SULFATE 75 MG/75MG
1 TABLET, ORALLY DISINTEGRATING ORAL
Qty: 0 | Refills: 0 | DISCHARGE

## 2022-03-24 RX ORDER — ATORVASTATIN CALCIUM 80 MG/1
1 TABLET, FILM COATED ORAL
Qty: 30 | Refills: 0
Start: 2022-03-24 | End: 2022-04-22

## 2022-03-24 RX ORDER — CHOLECALCIFEROL (VITAMIN D3) 125 MCG
1 CAPSULE ORAL
Qty: 0 | Refills: 0 | DISCHARGE

## 2022-03-24 RX ORDER — ACETAMINOPHEN 500 MG
2 TABLET ORAL
Qty: 112 | Refills: 0
Start: 2022-03-24 | End: 2022-04-06

## 2022-03-24 RX ORDER — PANTOPRAZOLE SODIUM 20 MG/1
1 TABLET, DELAYED RELEASE ORAL
Qty: 30 | Refills: 0
Start: 2022-03-24 | End: 2022-04-22

## 2022-03-24 RX ORDER — CLOPIDOGREL BISULFATE 75 MG/1
1 TABLET, FILM COATED ORAL
Qty: 30 | Refills: 0
Start: 2022-03-24 | End: 2022-04-22

## 2022-03-24 RX ORDER — SENNA PLUS 8.6 MG/1
2 TABLET ORAL
Qty: 28 | Refills: 0
Start: 2022-03-24 | End: 2022-04-06

## 2022-03-24 RX ORDER — ASPIRIN/CALCIUM CARB/MAGNESIUM 324 MG
1 TABLET ORAL
Qty: 30 | Refills: 0
Start: 2022-03-24 | End: 2022-04-22

## 2022-03-24 RX ADMIN — Medication 650 MILLIGRAM(S): at 04:25

## 2022-03-24 RX ADMIN — HEPARIN SODIUM 5000 UNIT(S): 5000 INJECTION INTRAVENOUS; SUBCUTANEOUS at 06:23

## 2022-03-24 RX ADMIN — Medication 650 MILLIGRAM(S): at 05:00

## 2022-03-24 RX ADMIN — Medication 100 MILLIGRAM(S): at 06:25

## 2022-03-24 RX ADMIN — PANTOPRAZOLE SODIUM 40 MILLIGRAM(S): 20 TABLET, DELAYED RELEASE ORAL at 06:23

## 2022-03-24 RX ADMIN — SODIUM CHLORIDE 3 MILLILITER(S): 9 INJECTION INTRAMUSCULAR; INTRAVENOUS; SUBCUTANEOUS at 06:58

## 2022-03-24 NOTE — DISCHARGE NOTE NURSING/CASE MANAGEMENT/SOCIAL WORK - NSDCFUADDAPPT_GEN_ALL_CORE_FT
-Please follow up with Dr. Adair on 4/4/22 via FastDue at 9:45am. The office is located at [facility], [facility], 4th floor. Call us with any questions, #[phone].  -Please follow with Dr. Alarcon (neurology) on 4/19/22 at 3:00pm.     -Walk daily as tolerated and use your incentive spirometer every hour.    -No driving or strenuous activity/exercise for 6 weeks, or until cleared by your surgeon.    -You may shower.  Be sure to gently clean your incisions with anti-bacterial soap and water daily, pat dry.  You may leave them open to air.    -Call your doctor if you have shortness of breath, chest pain not relieved by pain medication, dizziness, fever >101.5, or increased redness or drainage from incisions.

## 2022-03-24 NOTE — DISCHARGE NOTE PROVIDER - CARE PROVIDER_API CALL
Rowdy Adair)  Cardiology; Interventional Cardiology  [address], [address] [place] [address]  Phone: [phone]  Fax: [phone]  Follow Up Time:     Elin Alarcon)  Neurology  [address] [address]  Phone: [phone]  Fax: [phone]  Follow Up Time:

## 2022-03-24 NOTE — PROGRESS NOTE ADULT - SUBJECTIVE AND OBJECTIVE BOX
Patient discussed on morning rounds with Dr. Adair    Operation / Date: 3/23/22 PFO Closure     Surgeon: Dr. Adair    Referring Physician: Dr. Alarcon    SUBJECTIVE ASSESSMENT:  51y Female seen and examined. Patient feels well, offers no acute complaints. SHe is up and ambulating, tolerating po diet and voiding. She denies fever, chest pain, palpitations, SOB, abdominal pain, n/v, dizziness, weakness.     Hospital Course:  51 year old F, former smoker, PMHx of superior cerebellum/peduncle and left occipital stroke, loop recorder placement, pfo who presented to hospital for surgery.   Pt. with no new neurologic events since last visit. Pt. with fecal occult + and was cleared by GI.  Upon putting her tourniquet for blood draw she synopsized.  Her BP was 126/76  HR 44 , CTH unchanged and neurology consulted and cleared patient for PFO closure. Today she underwent PFO closure. Arrived to [facility] as mini ICU stable on no gtts. Today POD1, patient feels well, offers no acute complaints. She is ambulating on RA, tolerating PO Diet and voiding. Per Dr. Adair patient is ready for discharge on asa/plavix.     35 minutes was spent with the patient reviewing the discharge material including medications, follow up appointments, recovery, concerning symptoms, and how to contact their health care providers if they have questions.     Vital Signs Last 24 Hrs  T(C): 36.5 (24 Mar 2022 05:01), Max: 36.5 (24 Mar 2022 05:01)  T(F): 97.7 (24 Mar 2022 05:01), Max: 97.7 (24 Mar 2022 05:01)  HR: 58 (24 Mar 2022 08:33) (49 - 73)  BP: 123/87 (24 Mar 2022 08:33) (119/71 - 165/92)  BP(mean): 99 (24 Mar 2022 08:33) (90 - 125)  RR: 18 (24 Mar 2022 08:33) (12 - 18)  SpO2: 100% (24 Mar 2022 08:33) (98% - 100%)  I&O's Detail    23 Mar 2022 07:01  -  24 Mar 2022 07:00  --------------------------------------------------------  IN:    IV PiggyBack: 150 mL    Lactated Ringers: 20 mL    Oral Fluid: 240 mL  Total IN: 410 mL    OUT:    Voided (mL): 375 mL  Total OUT: 375 mL    Total NET: 35 mL        PHYSICAL EXAM:  General: Patient lying comfortably in bed, no acute distress     Neurological: Alert and oriented. No focal neurological deficits     Cardiovascular: S1S2, RRR, no murmurs appreciated on exam     Respiratory: Clear to ausculation bilaterally, no wheeze/rhonchi/rales    Gastrointestinal: + BS, soft, non tender, non distended     Extremities: Warm and well perfused. No edema, no calf tenderness     Vascular: 2+ Peripheral pulses b/l     Incision Sites: b/l groin: soft, no hematoma.     LABS:                        12.8   6.01  )-----------( 252      ( 24 Mar 2022 06:04 )             39.3       COUMADIN:  NO    PT/INR - ( 24 Mar 2022 06:04 )   PT: 13.3 sec;   INR: 1.12          PTT - ( 24 Mar 2022 06:04 )  PTT:29.9 sec    03-24    138  |  104  |  10  ----------------------------<  93  4.7   |  25  |  0.97    Ca    8.9      24 Mar 2022 06:04  Phos  4.6     03-24  Mg     2.0     03-24    TPro  6.9  /  Alb  4.3  /  TBili  0.5  /  DBili  x   /  AST  16  /  ALT  9<L>  /  AlkPhos  57  03-23          MEDICATIONS  (STANDING):  SEE MED REC       Discharge CXR:  < from: Xray Chest 1 View-PORTABLE IMMEDIATE (Xray Chest 1 View-PORTABLE IMMEDIATE .) (03.23.22 @ 14:43) >    Single frontal view of the chest demonstrates the lungs to be clear. The   cardiomediastinal silhouette is normal. No acute osseous abnormalities.   Overlying EKG leads and wires are noted. Left-sided loop recorder.    IMPRESSION: No acute cardiopulmonary disease process.    < end of copied text >      Discharge ECHO:    
Post op     Operation / Date: 3.23.22 PFO closure     SUBJECTIVE ASSESSMENT:  51y Female seen and examined. Feels well, offers no acute complaints. Denies fever, chest pain, palpitations, SOB, abdominal pain., AMS, HA.         Vital Signs Last 24 Hrs  T(C): --  T(F): --  HR: 54 (23 Mar 2022 13:15) (54 - 54)  BP: 156/102 (23 Mar 2022 13:15) (156/102 - 156/102)  BP(mean): 125 (23 Mar 2022 13:15) (125 - 125)  RR: 18 (23 Mar 2022 13:15) (18 - 18)  SpO2: 100% (23 Mar 2022 13:15) (100% - 100%)  I&O's Detail      PHYSICAL EXAM:    General: Patient lying comfortably in bed, no acute distress     Neurological: Alert and oriented. No focal neurological deficits, strength 5/5 b/l UE and LE.     Cardiovascular: S1S2, RRR, no murmurs appreciated on exam     Respiratory: Clear to ausculation bilaterally, no wheeze/rhonchi/rales    Gastrointestinal: + BS, soft, non tender, non distended     Extremities: Warm and well perfused. No edema, no calf tenderness     Vascular: 2+ Peripheral pulses b/l     Incision Sites: b/l groin; no hematoma/bleeding    LABS:                        13.5   6.97  )-----------( 287      ( 23 Mar 2022 06:41 )             40.8       COUMADIN:  No    PT/INR - ( 23 Mar 2022 06:41 )   PT: 12.9 sec;   INR: 1.08          PTT - ( 23 Mar 2022 06:41 )  PTT:29.6 sec    03-23    139  |  104  |  12  ----------------------------<  129<H>  3.5   |  22  |  0.90    Ca    9.1      23 Mar 2022 06:41  Phos  3.9     03-23  Mg     2.0     03-23    TPro  6.6  /  Alb  4.1  /  TBili  0.5  /  DBili  x   /  AST  15  /  ALT  8<L>  /  AlkPhos  54  03-23          MEDICATIONS  (STANDING):  aspirin enteric coated 81 milliGRAM(s) Oral daily  atorvastatin 40 milliGRAM(s) Oral at bedtime  ceFAZolin   IVPB 2000 milliGRAM(s) IV Intermittent every 8 hours  dextrose 5% + sodium chloride 0.45%. 1000 milliLiter(s) (100 mL/Hr) IV Continuous <Continuous>  heparin   Injectable 5000 Unit(s) SubCutaneous every 8 hours  lactated ringers. 500 milliLiter(s) (50 mL/Hr) IV Continuous <Continuous>  pantoprazole    Tablet 40 milliGRAM(s) Oral before breakfast  senna 2 Tablet(s) Oral at bedtime  sodium chloride 0.9% lock flush 3 milliLiter(s) IV Push every 8 hours    MEDICATIONS  (PRN):  acetaminophen     Tablet .. 650 milliGRAM(s) Oral every 6 hours PRN Mild Pain (1 - 3)        RADIOLOGY & ADDITIONAL TESTS:

## 2022-03-24 NOTE — DISCHARGE NOTE NURSING/CASE MANAGEMENT/SOCIAL WORK - NSDCPEFALRISK_GEN_ALL_CORE
For information on Fall & Injury Prevention, visit: [email or web address] OR  [email or web address] OR  https://www.cdc.gov/steadi/patient.html

## 2022-03-24 NOTE — DISCHARGE NOTE PROVIDER - NSDCFUSCHEDAPPT_GEN_ALL_CORE_FT
RUMA SARABIA ; 04/06/2022 ; [facility] Cardio Vasc [address]  RUMA SARABIA ; 04/19/2022 ; [facility] Neuro [address]

## 2022-03-24 NOTE — DISCHARGE NOTE NURSING/CASE MANAGEMENT/SOCIAL WORK - PATIENT PORTAL LINK FT
You can access the FollowMyHealth Patient Portal offered by [facility] by registering at the following website: [email or web address]. By joining [facility]’s FollowMyHealth portal, you will also be able to view your health information using other applications (apps) compatible with our system.

## 2022-03-24 NOTE — DISCHARGE NOTE PROVIDER - CARE PROVIDERS DIRECT ADDRESSES
[email or web address]

## 2022-03-24 NOTE — DISCHARGE NOTE PROVIDER - NSDCCPTREATMENT_GEN_ALL_CORE_FT
PRINCIPAL PROCEDURE  Procedure: Endovascular repair of patent foramen ovale (PFO)  Findings and Treatment: transcatheter PFO closure

## 2022-03-24 NOTE — DISCHARGE NOTE PROVIDER - NSDCFUADDAPPT_GEN_ALL_CORE_FT
-Please follow up with Dr. Adair on 4/4/22 via [employer] at 9:45am. The office is located at [facility], [facility], 4th floor. Call us with any questions, #[phone].  -Please follow with Dr. Alarcon (neurology) on 4/19/22 at 3:00pm.     -Walk daily as tolerated and use your incentive spirometer every hour.    -No driving or strenuous activity/exercise for 6 weeks, or until cleared by your surgeon.    -You may shower.  Be sure to gently clean your incisions with anti-bacterial soap and water daily, pat dry.  You may leave them open to air.    -Call your doctor if you have shortness of breath, chest pain not relieved by pain medication, dizziness, fever >101.5, or increased redness or drainage from incisions.

## 2022-03-24 NOTE — DISCHARGE NOTE PROVIDER - HOSPITAL COURSE
51 year old F, former smoker, PMHx of superior cerebellum/peduncle and left occipital stroke, loop recorder placement, pfo who presented to hospital for surgery.   Pt. with no new neurologic events since last visit. Pt. with fecal occult + and was cleared by GI.  Upon putting her tourniquet for blood draw she synopsized.  Her BP was 126/76  HR 44 , CTH unchanged and neurology consulted and cleared patient for PFO closure. Today she underwent PFO closure. Arrived to [facility] as mini ICU stable on no gtts. Today POD1, patient feels well, offers no acute complaints. She is ambulating on RA, tolerating PO Diet and voiding. Per Dr. Adair patient is ready for discharge on asa/plavix.     35 minutes was spent with the patient reviewing the discharge material including medications, follow up appointments, recovery, concerning symptoms, and how to contact their health care providers if they have questions.

## 2022-03-24 NOTE — DISCHARGE NOTE PROVIDER - NSDCMRMEDTOKEN_GEN_ALL_CORE_FT
acetaminophen 325 mg oral tablet: 2 tab(s) orally every 6 hours, As needed, Mild Pain (1 - 3)  Aspirin Enteric Coated 81 mg oral delayed release tablet: 1 tab(s) orally once a day   atorvastatin 40 mg oral tablet: 1 tab(s) orally once a day (at bedtime)  clopidogrel 75 mg oral tablet: 1 tab(s) orally once a day  pantoprazole 40 mg oral delayed release tablet: 1 tab(s) orally once a day (before a meal)  senna oral tablet: 2 tab(s) orally once a day (at bedtime)

## 2022-03-25 ENCOUNTER — NON-APPOINTMENT (OUTPATIENT)
Age: 52
End: 2022-03-25

## 2022-03-29 DIAGNOSIS — Z87.891 PERSONAL HISTORY OF NICOTINE DEPENDENCE: ICD-10-CM

## 2022-03-29 DIAGNOSIS — Z79.02 LONG TERM (CURRENT) USE OF ANTITHROMBOTICS/ANTIPLATELETS: ICD-10-CM

## 2022-03-29 DIAGNOSIS — Q21.1 ATRIAL SEPTAL DEFECT: ICD-10-CM

## 2022-03-29 DIAGNOSIS — Z95.818 PRESENCE OF OTHER CARDIAC IMPLANTS AND GRAFTS: ICD-10-CM

## 2022-03-29 DIAGNOSIS — Z86.73 PERSONAL HISTORY OF TRANSIENT ISCHEMIC ATTACK (TIA), AND CEREBRAL INFARCTION WITHOUT RESIDUAL DEFICITS: ICD-10-CM

## 2022-03-29 DIAGNOSIS — R55 SYNCOPE AND COLLAPSE: ICD-10-CM

## 2022-03-29 DIAGNOSIS — Z79.82 LONG TERM (CURRENT) USE OF ASPIRIN: ICD-10-CM

## 2022-04-04 ENCOUNTER — APPOINTMENT (OUTPATIENT)
Dept: CARDIOTHORACIC SURGERY | Facility: CLINIC | Age: 52
End: 2022-04-04
Payer: MEDICAID

## 2022-04-04 PROCEDURE — 99213 OFFICE O/P EST LOW 20 MIN: CPT | Mod: 95

## 2022-04-05 RX ORDER — ASPIRIN 81 MG/1
81 TABLET, FILM COATED ORAL
Qty: 30 | Refills: 0 | Status: ACTIVE | COMMUNITY
Start: 2022-03-24

## 2022-04-06 ENCOUNTER — APPOINTMENT (OUTPATIENT)
Dept: HEART AND VASCULAR | Facility: CLINIC | Age: 52
End: 2022-04-06
Payer: MEDICAID

## 2022-04-06 ENCOUNTER — NON-APPOINTMENT (OUTPATIENT)
Age: 52
End: 2022-04-06

## 2022-04-06 PROCEDURE — 93298 REM INTERROG DEV EVAL SCRMS: CPT

## 2022-04-06 PROCEDURE — G2066: CPT

## 2022-04-19 ENCOUNTER — APPOINTMENT (OUTPATIENT)
Dept: NEUROLOGY | Facility: CLINIC | Age: 52
End: 2022-04-19
Payer: MEDICAID

## 2022-04-19 VITALS
DIASTOLIC BLOOD PRESSURE: 92 MMHG | SYSTOLIC BLOOD PRESSURE: 127 MMHG | TEMPERATURE: 96.2 F | WEIGHT: 152 LBS | HEART RATE: 79 BPM | OXYGEN SATURATION: 100 % | HEIGHT: 65 IN | BODY MASS INDEX: 25.33 KG/M2

## 2022-04-19 PROCEDURE — 99213 OFFICE O/P EST LOW 20 MIN: CPT

## 2022-04-19 NOTE — HISTORY OF PRESENT ILLNESS
[FreeTextEntry1] : The patient is a very pleasant 51-year-old female admitted 11/2021 with acute right superior cerebellum/peduncle and left occipital stroke.  Workup was unrevealing apart from PFO which she underwent closure of last month. Since, she has been doing well. She has had no new stroke-like symptoms. She is tolerating Plavix, statin well. She has f/u with Dr. Adair next week with TTE at that time. Of note, she did see that her ILR picked up a possible pause last month just prior to her procedure, before her syncopal episode. She was asymptomatic.

## 2022-04-19 NOTE — ASSESSMENT
D1C10 herceptin infused per order, pt tolerated well, discharged home ambulatory [FreeTextEntry1] : The patient is a very pleasant 51-year-old female admitted 11/2021 with acute right superior cerebellum/peduncle and left occipital stroke thought most likely secondary to PFO s/p closure. She should continue antiplatelet therapy - either Plavix or aspirin per cardiology's recs next week. I have sent her a prescription for lower dose statin; we will recheck labs at f/u in 3 months.

## 2022-04-24 ENCOUNTER — FORM ENCOUNTER (OUTPATIENT)
Age: 52
End: 2022-04-24

## 2022-04-25 ENCOUNTER — OUTPATIENT (OUTPATIENT)
Dept: OUTPATIENT SERVICES | Facility: HOSPITAL | Age: 52
LOS: 1 days | End: 2022-04-25
Payer: MEDICAID

## 2022-04-25 ENCOUNTER — APPOINTMENT (OUTPATIENT)
Dept: CARDIOTHORACIC SURGERY | Facility: CLINIC | Age: 52
End: 2022-04-25
Payer: MEDICAID

## 2022-04-25 VITALS
HEIGHT: 65 IN | HEART RATE: 58 BPM | OXYGEN SATURATION: 99 % | TEMPERATURE: 97 F | WEIGHT: 152 LBS | DIASTOLIC BLOOD PRESSURE: 88 MMHG | BODY MASS INDEX: 25.33 KG/M2 | SYSTOLIC BLOOD PRESSURE: 125 MMHG | RESPIRATION RATE: 18 BRPM

## 2022-04-25 DIAGNOSIS — Q21.1 ATRIAL SEPTAL DEFECT: ICD-10-CM

## 2022-04-25 PROCEDURE — 93306 TTE W/DOPPLER COMPLETE: CPT

## 2022-04-25 PROCEDURE — 93306 TTE W/DOPPLER COMPLETE: CPT | Mod: 26

## 2022-04-25 PROCEDURE — 99213 OFFICE O/P EST LOW 20 MIN: CPT

## 2022-04-26 NOTE — HISTORY OF PRESENT ILLNESS
[FreeTextEntry1] : \par This visit was provided via telehealth using real-time 2-way audio visual technology. The patient, RUMA SARABIA, was located at home, [address] [place] [address] , at the time of the visit. The provider was located at [address] [place] [address]. The patient, RUMA SARABIA, Dr. Rowdy Adair and DEVANTE WEEMS all participated in the telehealth encounter. Verbal consent given on 04/04/2022 by RUMAVINH SARABIA.\par \par \par 51 year old former smoking female who was recently admitted for Right superior cerebellum/peduncle and left occipital stroke, Loop recorder placement, patent foramen ovale w s/p PFO closure on 03/23/2022 who presents for follow up after discharge. \par \par The patient tolerated the procedure well with no complaints and was discharged home on POD #1. \par \par Since her discharge, the patient is feeling well with no complaints. She denies any SOB at rest or with exertion, chest pain, palpitations, dizziness, syncope, or LE edema.  She denies any groin issues, fever or chills.

## 2022-04-27 NOTE — PHYSICAL EXAM
[Well Developed] : well developed [Well Nourished] : well nourished [No Acute Distress] : no acute distress [Normal Venous Pressure] : normal venous pressure [Normal S1, S2] : normal S1, S2 [No Murmur] : no murmur [No Rub] : no rub [Clear Lung Fields] : clear lung fields [Good Air Entry] : good air entry [No Respiratory Distress] : no respiratory distress  [Soft] : abdomen soft [Non Tender] : non-tender [Normal Gait] : normal gait [No Edema] : no edema [No Cyanosis] : no cyanosis [No Rash] : no rash [Moves all extremities] : moves all extremities [No Focal Deficits] : no focal deficits [Alert and Oriented] : alert and oriented [Normal memory] : normal memory

## 2022-05-11 ENCOUNTER — APPOINTMENT (OUTPATIENT)
Dept: HEART AND VASCULAR | Facility: CLINIC | Age: 52
End: 2022-05-11
Payer: MEDICAID

## 2022-05-11 ENCOUNTER — NON-APPOINTMENT (OUTPATIENT)
Age: 52
End: 2022-05-11

## 2022-05-11 VITALS
WEIGHT: 156 LBS | DIASTOLIC BLOOD PRESSURE: 97 MMHG | SYSTOLIC BLOOD PRESSURE: 150 MMHG | HEIGHT: 65 IN | HEART RATE: 60 BPM | TEMPERATURE: 97.4 F | OXYGEN SATURATION: 99 % | BODY MASS INDEX: 25.99 KG/M2

## 2022-05-11 VITALS — SYSTOLIC BLOOD PRESSURE: 142 MMHG | DIASTOLIC BLOOD PRESSURE: 91 MMHG

## 2022-05-11 PROCEDURE — 99203 OFFICE O/P NEW LOW 30 MIN: CPT | Mod: 25

## 2022-05-11 PROCEDURE — 36415 COLL VENOUS BLD VENIPUNCTURE: CPT

## 2022-05-11 PROCEDURE — 93000 ELECTROCARDIOGRAM COMPLETE: CPT

## 2022-05-11 PROCEDURE — G2066: CPT

## 2022-05-11 PROCEDURE — 93298 REM INTERROG DEV EVAL SCRMS: CPT

## 2022-05-11 NOTE — HISTORY OF PRESENT ILLNESS
[FreeTextEntry1] : Ms. Agrawal is a 52yo W with CVA (rt superior cerebellum/peduncle and left occipital stroke in Nov 2021), ILR, PFO s/p PFO closure 3/23/22 who presents to establish care. \par \par CVA:\par -s/p PFO closure\par -has ILR\par -on aspirin, atorvastatin 20mg \par \par BP:\par -reports BP used to be normal, then started to increase\par -has been checking BPs daily -- for past 2 months; range is 130s/90s\par -today BP monitor made a comment that 'arrhythmia detected' \par -this AM BP on home monitor was \par \par Overall feels well. No chest pain, dyspnea. Physically active -- dancer. \par \par March 2022 Labs\par Hb 12.8\par Hct 29.3\par Plts 252\par K 4.7\par Cr 0.97\par AST 16\par ALT 9\par Chol 114\par Trig 67\par HDL 49\par LDL 52\par TSH 2.27\par A1c 5.3%\par \par PMH/PSH:\par as above; also\par migraines\par tinnitus\par \par FH:\par no strokes\par MGM: quadruple bypass at age 60 (had DM, obesity, tob)\par mother: HTN\par father: HL\par \par SH:\par choreographer\par former tob\par no etoh\par \par ALL:\par sulfa\par \par ROS:\par no fever/chills\par no nausea/vomiting\par no chest pain/dyspnea\par occ palpitations described as being aware\par \par Lifestyle History:\par Mediterranean Diet Score (9 question survey) was 6\par (8-9: optimal, 6-7: near-optimal, 4-5: suboptimal, 0-3: markedly suboptimal)\par Exercise: Patient reports exercising at a moderate level for >150 minutes per week. \par Smoking: Former smoker, quit 28yrs ago, smoked ~4 cigs/day\par No etoh\par Stress: Patient denies any stress. \par No snoring\par \par No PCOS\par No pregnancies\par \par BP repeated during visit and checked against home monitor:\par with office cuff, BP later during visit 120s/80s, on home cuff 130s/90s

## 2022-05-11 NOTE — CARDIOLOGY SUMMARY
[de-identified] : \par 5/11/22 EKG: sinus bradycardia at 59bpm, otherwise nl [de-identified] : \par 4/25/22 TTE: nl biv size and fxn, no sig valvular disease, no pericardial effusion, atrial septal occluder device with no color Doppler shunt and neg bubble study

## 2022-05-11 NOTE — PHYSICAL EXAM
[No Xanthelasma] : no xanthelasma [Normal S1, S2] : normal S1, S2 [No Murmur] : no murmur [Normal] : clear lung fields, good air entry, no respiratory distress [Soft] : abdomen soft [Non Tender] : non-tender [No Edema] : no edema [Moves all extremities] : moves all extremities [Alert and Oriented] : alert and oriented

## 2022-05-11 NOTE — DISCUSSION/SUMMARY
[FreeTextEntry1] : Ms. Agrawal is a 50yo W with CVA (rt superior cerebellum/peduncle and left occipital stroke in Nov 2021), ILR, PFO s/p PFO closure 3/23/22 who presents to [facility] care. \par \par Elevated BP:\par -does not have HTN diagnosis\par -BPs at office visits have been variable \par -at recheck during office visit, BPs much improved and home monitor seems to be overestimating\par -will check 24hr ABPM before prescribing pharm rx for presumed HTN given significant variability in measurements\par \par CVA:\par -s/p PFO closure\par -has ILR -- confirmed with Dr. Jacques that no arrhythmias detected today despite alert from her BP monitor\par -will check Lp(a)\par \par Close BP follow-up -- will discuss over phone results of ABMP and f/u 2-4 weeks after that based on decision re: med rx for BP

## 2022-05-11 NOTE — HISTORY OF PRESENT ILLNESS
[FreeTextEntry1] : 51 year old former smoking female who was recently admitted for Right superior cerebellum/peduncle and left occipital stroke on 11/2021, followed by loop recorder placement, patent foramen ovale w s/p PFO closure on 03/23/2022 who presents for follow up. \par \par Since her last visit, the patient states she has been feeling well and offering no acute complaints. She reports being compliant with her medications and reports that the groin incision sites are completely healed. She reports mild soreness but it is improving.

## 2022-05-13 ENCOUNTER — NON-APPOINTMENT (OUTPATIENT)
Age: 52
End: 2022-05-13

## 2022-05-16 ENCOUNTER — TRANSCRIPTION ENCOUNTER (OUTPATIENT)
Age: 52
End: 2022-05-16

## 2022-05-16 ENCOUNTER — APPOINTMENT (OUTPATIENT)
Dept: HEART AND VASCULAR | Facility: CLINIC | Age: 52
End: 2022-05-16

## 2022-05-16 LAB — APO LP(A) SERPL-MCNC: 12.5 NMOL/L

## 2022-05-19 ENCOUNTER — TRANSCRIPTION ENCOUNTER (OUTPATIENT)
Age: 52
End: 2022-05-19

## 2022-05-19 ENCOUNTER — APPOINTMENT (OUTPATIENT)
Dept: NEPHROLOGY | Facility: CLINIC | Age: 52
End: 2022-05-19
Payer: MEDICAID

## 2022-05-19 VITALS — HEART RATE: 67 BPM | DIASTOLIC BLOOD PRESSURE: 85 MMHG | SYSTOLIC BLOOD PRESSURE: 125 MMHG

## 2022-05-19 PROCEDURE — 93784 AMBL BP MNTR W/SOFTWARE: CPT

## 2022-05-19 NOTE — PROCEDURE
[FreeTextEntry1] : Placed ABPM cuff on left upper arm. Gave instructions for device function and proper fit.\par approx sleep period: 1a - 9a\par current BP meds: none

## 2022-05-20 ENCOUNTER — TRANSCRIPTION ENCOUNTER (OUTPATIENT)
Age: 52
End: 2022-05-20

## 2022-05-23 ENCOUNTER — TRANSCRIPTION ENCOUNTER (OUTPATIENT)
Age: 52
End: 2022-05-23

## 2022-06-06 ENCOUNTER — TRANSCRIPTION ENCOUNTER (OUTPATIENT)
Age: 52
End: 2022-06-06

## 2022-06-07 ENCOUNTER — TRANSCRIPTION ENCOUNTER (OUTPATIENT)
Age: 52
End: 2022-06-07

## 2022-06-07 ENCOUNTER — EMERGENCY (EMERGENCY)
Facility: HOSPITAL | Age: 52
LOS: 1 days | Discharge: HOME | End: 2022-06-07
Attending: EMERGENCY MEDICINE | Admitting: EMERGENCY MEDICINE
Payer: MEDICAID

## 2022-06-07 VITALS
OXYGEN SATURATION: 99 % | RESPIRATION RATE: 16 BRPM | SYSTOLIC BLOOD PRESSURE: 146 MMHG | DIASTOLIC BLOOD PRESSURE: 83 MMHG | TEMPERATURE: 98 F | HEART RATE: 60 BPM

## 2022-06-07 VITALS
DIASTOLIC BLOOD PRESSURE: 103 MMHG | SYSTOLIC BLOOD PRESSURE: 143 MMHG | RESPIRATION RATE: 18 BRPM | TEMPERATURE: 98 F | OXYGEN SATURATION: 100 % | HEART RATE: 81 BPM | HEIGHT: 65 IN

## 2022-06-07 DIAGNOSIS — G43.909 MIGRAINE, UNSPECIFIED, NOT INTRACTABLE, WITHOUT STATUS MIGRAINOSUS: ICD-10-CM

## 2022-06-07 DIAGNOSIS — Z20.822 CONTACT WITH AND (SUSPECTED) EXPOSURE TO COVID-19: ICD-10-CM

## 2022-06-07 DIAGNOSIS — Z88.2 ALLERGY STATUS TO SULFONAMIDES: ICD-10-CM

## 2022-06-07 DIAGNOSIS — Q21.1 ATRIAL SEPTAL DEFECT: ICD-10-CM

## 2022-06-07 DIAGNOSIS — I69.322 DYSARTHRIA FOLLOWING CEREBRAL INFARCTION: ICD-10-CM

## 2022-06-07 DIAGNOSIS — R42 DIZZINESS AND GIDDINESS: ICD-10-CM

## 2022-06-07 DIAGNOSIS — Z79.82 LONG TERM (CURRENT) USE OF ASPIRIN: ICD-10-CM

## 2022-06-07 DIAGNOSIS — R00.1 BRADYCARDIA, UNSPECIFIED: ICD-10-CM

## 2022-06-07 DIAGNOSIS — Z86.73 PERSONAL HISTORY OF TRANSIENT ISCHEMIC ATTACK (TIA), AND CEREBRAL INFARCTION WITHOUT RESIDUAL DEFICITS: ICD-10-CM

## 2022-06-07 DIAGNOSIS — H53.9 UNSPECIFIED VISUAL DISTURBANCE: ICD-10-CM

## 2022-06-07 LAB
ALBUMIN SERPL ELPH-MCNC: 3.8 G/DL — SIGNIFICANT CHANGE UP (ref 3.4–5)
ALP SERPL-CCNC: 48 U/L — SIGNIFICANT CHANGE UP (ref 40–120)
ALT FLD-CCNC: 18 U/L — SIGNIFICANT CHANGE UP (ref 12–42)
ANION GAP SERPL CALC-SCNC: 4 MMOL/L — LOW (ref 9–16)
APPEARANCE UR: CLEAR — SIGNIFICANT CHANGE UP
APTT BLD: 32.3 SEC — SIGNIFICANT CHANGE UP (ref 27.5–35.5)
AST SERPL-CCNC: 16 U/L — SIGNIFICANT CHANGE UP (ref 15–37)
BASOPHILS # BLD AUTO: 0.07 K/UL — SIGNIFICANT CHANGE UP (ref 0–0.2)
BASOPHILS NFR BLD AUTO: 0.8 % — SIGNIFICANT CHANGE UP (ref 0–2)
BILIRUB SERPL-MCNC: 0.4 MG/DL — SIGNIFICANT CHANGE UP (ref 0.2–1.2)
BILIRUB UR-MCNC: NEGATIVE — SIGNIFICANT CHANGE UP
BUN SERPL-MCNC: 17 MG/DL — SIGNIFICANT CHANGE UP (ref 7–23)
CALCIUM SERPL-MCNC: 8.9 MG/DL — SIGNIFICANT CHANGE UP (ref 8.5–10.5)
CHLORIDE SERPL-SCNC: 105 MMOL/L — SIGNIFICANT CHANGE UP (ref 96–108)
CO2 SERPL-SCNC: 32 MMOL/L — HIGH (ref 22–31)
COLOR SPEC: YELLOW — SIGNIFICANT CHANGE UP
CREAT SERPL-MCNC: 0.91 MG/DL — SIGNIFICANT CHANGE UP (ref 0.5–1.3)
DIFF PNL FLD: ABNORMAL
EGFR: 76 ML/MIN/1.73M2 — SIGNIFICANT CHANGE UP
EOSINOPHIL # BLD AUTO: 0.16 K/UL — SIGNIFICANT CHANGE UP (ref 0–0.5)
EOSINOPHIL NFR BLD AUTO: 1.8 % — SIGNIFICANT CHANGE UP (ref 0–6)
EPI CELLS # UR: SIGNIFICANT CHANGE UP /HPF (ref 0–5)
GLUCOSE SERPL-MCNC: 106 MG/DL — HIGH (ref 70–99)
GLUCOSE UR QL: NEGATIVE — SIGNIFICANT CHANGE UP
HCG SERPL-ACNC: <1 MIU/ML — SIGNIFICANT CHANGE UP
HCT VFR BLD CALC: 40.3 % — SIGNIFICANT CHANGE UP (ref 34.5–45)
HGB BLD-MCNC: 13 G/DL — SIGNIFICANT CHANGE UP (ref 11.5–15.5)
IMM GRANULOCYTES NFR BLD AUTO: 0.2 % — SIGNIFICANT CHANGE UP (ref 0–1.5)
INR BLD: 1.14 — SIGNIFICANT CHANGE UP (ref 0.88–1.16)
KETONES UR-MCNC: NEGATIVE — SIGNIFICANT CHANGE UP
LEUKOCYTE ESTERASE UR-ACNC: NEGATIVE — SIGNIFICANT CHANGE UP
LYMPHOCYTES # BLD AUTO: 2.16 K/UL — SIGNIFICANT CHANGE UP (ref 1–3.3)
LYMPHOCYTES # BLD AUTO: 24.5 % — SIGNIFICANT CHANGE UP (ref 13–44)
MAGNESIUM SERPL-MCNC: 2.2 MG/DL — SIGNIFICANT CHANGE UP (ref 1.6–2.6)
MCHC RBC-ENTMCNC: 29.1 PG — SIGNIFICANT CHANGE UP (ref 27–34)
MCHC RBC-ENTMCNC: 32.3 GM/DL — SIGNIFICANT CHANGE UP (ref 32–36)
MCV RBC AUTO: 90.4 FL — SIGNIFICANT CHANGE UP (ref 80–100)
MONOCYTES # BLD AUTO: 0.52 K/UL — SIGNIFICANT CHANGE UP (ref 0–0.9)
MONOCYTES NFR BLD AUTO: 5.9 % — SIGNIFICANT CHANGE UP (ref 2–14)
NEUTROPHILS # BLD AUTO: 5.9 K/UL — SIGNIFICANT CHANGE UP (ref 1.8–7.4)
NEUTROPHILS NFR BLD AUTO: 66.8 % — SIGNIFICANT CHANGE UP (ref 43–77)
NITRITE UR-MCNC: NEGATIVE — SIGNIFICANT CHANGE UP
NRBC # BLD: 0 /100 WBCS — SIGNIFICANT CHANGE UP (ref 0–0)
PH UR: 6 — SIGNIFICANT CHANGE UP (ref 5–8)
PLATELET # BLD AUTO: 285 K/UL — SIGNIFICANT CHANGE UP (ref 150–400)
POTASSIUM SERPL-MCNC: 4.9 MMOL/L — SIGNIFICANT CHANGE UP (ref 3.5–5.3)
POTASSIUM SERPL-SCNC: 4.9 MMOL/L — SIGNIFICANT CHANGE UP (ref 3.5–5.3)
PROT SERPL-MCNC: 7.1 G/DL — SIGNIFICANT CHANGE UP (ref 6.4–8.2)
PROT UR-MCNC: NEGATIVE MG/DL — SIGNIFICANT CHANGE UP
PROTHROM AB SERPL-ACNC: 13.3 SEC — SIGNIFICANT CHANGE UP (ref 10.5–13.4)
RBC # BLD: 4.46 M/UL — SIGNIFICANT CHANGE UP (ref 3.8–5.2)
RBC # FLD: 13.3 % — SIGNIFICANT CHANGE UP (ref 10.3–14.5)
RBC CASTS # UR COMP ASSIST: < 5 /HPF — SIGNIFICANT CHANGE UP
SARS-COV-2 RNA SPEC QL NAA+PROBE: SIGNIFICANT CHANGE UP
SODIUM SERPL-SCNC: 141 MMOL/L — SIGNIFICANT CHANGE UP (ref 132–145)
SP GR SPEC: <=1.005 — SIGNIFICANT CHANGE UP (ref 1–1.03)
TROPONIN I, HIGH SENSITIVITY RESULT: <4 NG/L — SIGNIFICANT CHANGE UP
UROBILINOGEN FLD QL: 0.2 E.U./DL — SIGNIFICANT CHANGE UP
WBC # BLD: 8.83 K/UL — SIGNIFICANT CHANGE UP (ref 3.8–10.5)
WBC # FLD AUTO: 8.83 K/UL — SIGNIFICANT CHANGE UP (ref 3.8–10.5)
WBC UR QL: < 5 /HPF — SIGNIFICANT CHANGE UP

## 2022-06-07 PROCEDURE — 99285 EMERGENCY DEPT VISIT HI MDM: CPT

## 2022-06-07 PROCEDURE — 70450 CT HEAD/BRAIN W/O DYE: CPT | Mod: 26

## 2022-06-07 PROCEDURE — 93010 ELECTROCARDIOGRAM REPORT: CPT

## 2022-06-07 RX ORDER — ACETAMINOPHEN 500 MG
650 TABLET ORAL ONCE
Refills: 0 | Status: COMPLETED | OUTPATIENT
Start: 2022-06-07 | End: 2022-06-07

## 2022-06-07 RX ADMIN — Medication 650 MILLIGRAM(S): at 08:49

## 2022-06-07 NOTE — ED PROVIDER NOTE - PATIENT PORTAL LINK FT
You can access the FollowMyHealth Patient Portal offered by [facility] by registering at the following website: [email or web address]. By joining [facility]’s FollowMyHealth portal, you will also be able to view your health information using other applications (apps) compatible with our system.

## 2022-06-07 NOTE — ED PROVIDER NOTE - PROGRESS NOTE DETAILS
The patient is alert, cooperative with fluent and appropriate speech. The patient is requesting to leave the Emergency Department despite the recommendation of admission to the hospital and/or further testing in the Emergency Department.  The patient verbally expresses understanding of the risks of leaving including the possibility of permanent disability and death. The risks, benefits, hazards, alternatives and precautions pertaining to the patient's specific medical issue were discussed.  The patient verbalizes understanding of these risk, benefits, alternative and precautions back to me.  All lab and test results available at the time of the discussion with the pateint were communicated to the patient. The patient has been made aware that he/she is welcome to return at anytime to the Emergency Department for continued care. Close follow up with primary care doctor is recommended as soon as possible.  All of the patient's questions were answered. Signed out to me by day team pending neurology admission, awaiting bed placement. patient now states she no longer wants to be admitted as she has to leave to attend to personal matters. she states she is feeling better, HA improved, dizziness improved. she is asymptomatic now. has no neuro deficits. discussed risks of leaving prior to full evaluation/workup and possible delay in obtaining outpatient workup. called Dr. Alarcon via [facility] and discussed patient's wish to leave and have outpatient MRI/evaluation, Dr. Alarcon will order MRI and patient will call to schedule MRI and follow up with Dr. Alarcon afterwards. strict return precautions discussed. Signed out AMA

## 2022-06-07 NOTE — ED PROVIDER NOTE - NSFOLLOWUPINSTRUCTIONS_ED_ALL_ED_FT
Please follow up with Dr. Alarcon    Your workup/evaluation is incomplete today  You were recommended to be admitted inpatient for neurology evaluation and MRI however you declined as you needed to leave.     Return for any concerning or worsening symptoms or if you change your mind.

## 2022-06-07 NOTE — ED ADULT TRIAGE NOTE - CHIEF COMPLAINT QUOTE
Pt c/o dizziness since 7pm 6/6/22 w/ intermittent visual changes x10 days, denies acute visual changes at this time, HA or weakness. Endorses hx of stroke in November 2021. Pt ambulatory w/ steady gait.

## 2022-06-07 NOTE — ED PROVIDER NOTE - NS ED ATTENDING STATEMENT MOD
This was a shared visit with the MARQUES. I reviewed and verified the documentation and independently performed the documented:

## 2022-06-07 NOTE — ED PROVIDER NOTE - OBJECTIVE STATEMENT
52 y/o F with PMH of vertigo, migraine headaches, R superior cerebellum and L occipital stroke on 11/21 (presenting with dysarthria, headache, dysmetria, vertigo, ataxia that resolved and pt is back to baseline), PFO s/p closure and with loop recorder presents to ED mild dizziness with movement of her eyes or head while laying in bed at approx 1 am today.  She also notes intermittent episodes of bright lights along her peripheral vision 2 weeks ago followed by a R temporal headache.  Three days ago, she noted blurred vision described as a "kaleidoscope" without a headache.  She is presently taking ASA 81 mg and Lipitor 20 mg daily.  Pt is followed by Dr. Elin Alarcon, neurology.

## 2022-06-07 NOTE — ED PROVIDER NOTE - PHYSICAL EXAMINATION
Constitutional:  Well appearing, awake, alert, oriented to person, place, time/situation and in no apparent distress  Head:  NC/AT, symmetric, neck supple  ENMT: Airway patent, nasal mucosa clear, mouth with normal mucosa, throat has no vesicles, no oropharyngeal exudates and uvula is midline  Eyes:  Clear bilaterally, pupils equal, round and reactive to light  Cardiac:  Normal rate, regular rhythm. Heart sounds S1,S2.  No murmurs, rubs or gallops.  No JVD.  Respiratory:  Breath sounds clear and equal bilaterally  GI:   Abd soft, non-distended, non-tender, no guarding  :  No CVAT  MSK:  Spine appears normal, range of motion is not limited, no muscle or joint tenderness  Neuro:  Alert, oriented x person, place and time.  Cranial nerves 2-12 are intact.  Normal gait and speech.  Cerebellar testing normal:  negative Romberg, normal coordination and normal finger to nose, heal to shin and rapid alternating movements.  Normal sensory exam throughout.  No pronator drift.  5/5 bl upper extremity and lower extremity strength. Visual fields intact   Skin:  Skin normal color for race, warm, dry and intact.  No evidence of rash  Psych:  Normal mood and affect, no apparent risk to self or others.  Heme:  No adenopathy or splenomegaly.

## 2022-06-07 NOTE — ED PROVIDER NOTE - ST/T WAVE
Show Masseter Units: Yes Dilution (U/0.1 Cc): 1 Forehead Units: 0.5 Nasal Root Units: 0 Consent: Written consent obtained. Risks include but not limited to lid/brow ptosis, bruising, swelling, diplopia, temporary effect, incomplete chemical denervation. Show Right And Left Pupillary Line Units: No Post-Care Instructions: Patient instructed to not lie down for 4 hours and limit physical activity for 24 hours. Patient instructed not to travel by airplane for 48 hours. Additional Area 1 Location: Face Additional Area 3 Location: Chin Expiration Date (Month Year): 10/23 Additional Area 2 Location: Upper cutaneous lip Detail Level: Zone Lot #: [id number] no ST elevations/depressions

## 2022-06-07 NOTE — ED ADULT NURSE REASSESSMENT NOTE - NS ED NURSE REASSESS COMMENT FT1
pt asking for an update regarding bed assignment. bed board called and this RN was told that there are no beds currently, and no eta. information replayed to pt. pt states, "I spoke to my neurologist, dr. llamas who said I could either bed admitted or I can leave and get an outpatient MRI. I would like to just leave and get the MRI outpatient". provider made aware.

## 2022-06-07 NOTE — ED PROVIDER NOTE - CLINICAL SUMMARY MEDICAL DECISION MAKING FREE TEXT BOX
52 y/o F presents to ED c/o dizziness.  Pt well appearing, VSS, NAD.  Labs wnl.  NIH stroke scale zero.  CT head ordered.  Pt d/w Dr. Alarcon, neuro.  Will admit for MRI and further evaluation.    Pt discussed Dr. Alarcon via [facility].  Pt accepted for admission to Neuro Step Down.  Pt is amenable to admission.

## 2022-06-07 NOTE — ED PROVIDER NOTE - NS ED ROS FT
Constitutional:  no fever, no chills  Eyes:  no discharge, no irritations, no pain, no redness, visual changes  Ears:  no ear pain, no ear drainage,  no hearing problems  Nose:  no nasal congestion, no nasal drainage  Mouth/Throat:  no hoarseness and no throat pain  Neck:  no stiffness, no pain, no lumps, no swollen glands  Cardiac:  no chest pain, no edema  Respiratory:  no cough, no shortness of breath  GI: no abdominal pain, no bloating, no constipation, no diarrhea, no nausea, no vomiting  :  no dysuria, no urinary frequency, no hematuria, no discharge  MSK:  no back pain, no msk pain, no weakness  NEURO:  intermittent R temporal headache, no weakness, no numbness, + dizziness  Skin:  no lesions, no pruritis, no jaundice, no bruising, no rash  Psych:  no known mental health issues  Endocrine:  no diabetes, no thyroid issues

## 2022-06-07 NOTE — ED PROVIDER NOTE - ATTENDING APP SHARED VISIT CONTRIBUTION OF CARE
patient with hx of cerebellar cva, requiring admission with dr llamas for furthewr work up , symptomatic with dizziness, no focal neuro on exam, symptoms ongoing for several days.     agree with documentation and management.

## 2022-06-08 ENCOUNTER — TRANSCRIPTION ENCOUNTER (OUTPATIENT)
Age: 52
End: 2022-06-08

## 2022-06-08 ENCOUNTER — EMERGENCY (EMERGENCY)
Facility: HOSPITAL | Age: 52
LOS: 1 days | Discharge: HOME | End: 2022-06-08
Attending: EMERGENCY MEDICINE | Admitting: EMERGENCY MEDICINE
Payer: MEDICAID

## 2022-06-08 VITALS
TEMPERATURE: 98 F | RESPIRATION RATE: 18 BRPM | DIASTOLIC BLOOD PRESSURE: 92 MMHG | SYSTOLIC BLOOD PRESSURE: 147 MMHG | WEIGHT: 151.9 LBS | HEART RATE: 100 BPM | HEIGHT: 65 IN | OXYGEN SATURATION: 98 %

## 2022-06-08 VITALS
DIASTOLIC BLOOD PRESSURE: 90 MMHG | RESPIRATION RATE: 18 BRPM | SYSTOLIC BLOOD PRESSURE: 145 MMHG | TEMPERATURE: 98 F | HEART RATE: 67 BPM | OXYGEN SATURATION: 99 %

## 2022-06-08 DIAGNOSIS — Z86.73 PERSONAL HISTORY OF TRANSIENT ISCHEMIC ATTACK (TIA), AND CEREBRAL INFARCTION WITHOUT RESIDUAL DEFICITS: ICD-10-CM

## 2022-06-08 DIAGNOSIS — G43.909 MIGRAINE, UNSPECIFIED, NOT INTRACTABLE, WITHOUT STATUS MIGRAINOSUS: ICD-10-CM

## 2022-06-08 DIAGNOSIS — Z87.891 PERSONAL HISTORY OF NICOTINE DEPENDENCE: ICD-10-CM

## 2022-06-08 DIAGNOSIS — R07.89 OTHER CHEST PAIN: ICD-10-CM

## 2022-06-08 DIAGNOSIS — Z88.2 ALLERGY STATUS TO SULFONAMIDES: ICD-10-CM

## 2022-06-08 DIAGNOSIS — Z79.82 LONG TERM (CURRENT) USE OF ASPIRIN: ICD-10-CM

## 2022-06-08 DIAGNOSIS — R90.82 WHITE MATTER DISEASE, UNSPECIFIED: ICD-10-CM

## 2022-06-08 DIAGNOSIS — Z79.02 LONG TERM (CURRENT) USE OF ANTITHROMBOTICS/ANTIPLATELETS: ICD-10-CM

## 2022-06-08 DIAGNOSIS — Z95.818 PRESENCE OF OTHER CARDIAC IMPLANTS AND GRAFTS: ICD-10-CM

## 2022-06-08 DIAGNOSIS — R42 DIZZINESS AND GIDDINESS: ICD-10-CM

## 2022-06-08 DIAGNOSIS — Z20.822 CONTACT WITH AND (SUSPECTED) EXPOSURE TO COVID-19: ICD-10-CM

## 2022-06-08 DIAGNOSIS — H53.8 OTHER VISUAL DISTURBANCES: ICD-10-CM

## 2022-06-08 LAB
ALBUMIN SERPL ELPH-MCNC: 4.1 G/DL — SIGNIFICANT CHANGE UP (ref 3.3–5)
ALP SERPL-CCNC: 48 U/L — SIGNIFICANT CHANGE UP (ref 40–120)
ALT FLD-CCNC: 7 U/L — LOW (ref 10–45)
ANION GAP SERPL CALC-SCNC: 9 MMOL/L — SIGNIFICANT CHANGE UP (ref 5–17)
AST SERPL-CCNC: 15 U/L — SIGNIFICANT CHANGE UP (ref 10–40)
BASOPHILS # BLD AUTO: 0.04 K/UL — SIGNIFICANT CHANGE UP (ref 0–0.2)
BASOPHILS NFR BLD AUTO: 0.4 % — SIGNIFICANT CHANGE UP (ref 0–2)
BILIRUB SERPL-MCNC: 0.4 MG/DL — SIGNIFICANT CHANGE UP (ref 0.2–1.2)
BUN SERPL-MCNC: 15 MG/DL — SIGNIFICANT CHANGE UP (ref 7–23)
CALCIUM SERPL-MCNC: 8.9 MG/DL — SIGNIFICANT CHANGE UP (ref 8.4–10.5)
CHLORIDE SERPL-SCNC: 106 MMOL/L — SIGNIFICANT CHANGE UP (ref 96–108)
CK MB CFR SERPL CALC: 1.2 NG/ML — SIGNIFICANT CHANGE UP (ref 0–6.7)
CK SERPL-CCNC: 63 U/L — SIGNIFICANT CHANGE UP (ref 25–170)
CO2 SERPL-SCNC: 25 MMOL/L — SIGNIFICANT CHANGE UP (ref 22–31)
CREAT SERPL-MCNC: 0.86 MG/DL — SIGNIFICANT CHANGE UP (ref 0.5–1.3)
CULTURE RESULTS: SIGNIFICANT CHANGE UP
EGFR: 82 ML/MIN/1.73M2 — SIGNIFICANT CHANGE UP
EOSINOPHIL # BLD AUTO: 0.08 K/UL — SIGNIFICANT CHANGE UP (ref 0–0.5)
EOSINOPHIL NFR BLD AUTO: 0.9 % — SIGNIFICANT CHANGE UP (ref 0–6)
GLUCOSE SERPL-MCNC: 122 MG/DL — HIGH (ref 70–99)
HCT VFR BLD CALC: 38.8 % — SIGNIFICANT CHANGE UP (ref 34.5–45)
HGB BLD-MCNC: 12.6 G/DL — SIGNIFICANT CHANGE UP (ref 11.5–15.5)
IMM GRANULOCYTES NFR BLD AUTO: 0.3 % — SIGNIFICANT CHANGE UP (ref 0–1.5)
LYMPHOCYTES # BLD AUTO: 1.86 K/UL — SIGNIFICANT CHANGE UP (ref 1–3.3)
LYMPHOCYTES # BLD AUTO: 20.8 % — SIGNIFICANT CHANGE UP (ref 13–44)
MAGNESIUM SERPL-MCNC: 1.9 MG/DL — SIGNIFICANT CHANGE UP (ref 1.6–2.6)
MCHC RBC-ENTMCNC: 29.2 PG — SIGNIFICANT CHANGE UP (ref 27–34)
MCHC RBC-ENTMCNC: 32.5 GM/DL — SIGNIFICANT CHANGE UP (ref 32–36)
MCV RBC AUTO: 90 FL — SIGNIFICANT CHANGE UP (ref 80–100)
MONOCYTES # BLD AUTO: 0.56 K/UL — SIGNIFICANT CHANGE UP (ref 0–0.9)
MONOCYTES NFR BLD AUTO: 6.3 % — SIGNIFICANT CHANGE UP (ref 2–14)
NEUTROPHILS # BLD AUTO: 6.37 K/UL — SIGNIFICANT CHANGE UP (ref 1.8–7.4)
NEUTROPHILS NFR BLD AUTO: 71.3 % — SIGNIFICANT CHANGE UP (ref 43–77)
NRBC # BLD: 0 /100 WBCS — SIGNIFICANT CHANGE UP (ref 0–0)
PLATELET # BLD AUTO: 289 K/UL — SIGNIFICANT CHANGE UP (ref 150–400)
POTASSIUM SERPL-MCNC: 3.8 MMOL/L — SIGNIFICANT CHANGE UP (ref 3.5–5.3)
POTASSIUM SERPL-SCNC: 3.8 MMOL/L — SIGNIFICANT CHANGE UP (ref 3.5–5.3)
PROT SERPL-MCNC: 6.3 G/DL — SIGNIFICANT CHANGE UP (ref 6–8.3)
RBC # BLD: 4.31 M/UL — SIGNIFICANT CHANGE UP (ref 3.8–5.2)
RBC # FLD: 13.5 % — SIGNIFICANT CHANGE UP (ref 10.3–14.5)
SARS-COV-2 RNA SPEC QL NAA+PROBE: NEGATIVE — SIGNIFICANT CHANGE UP
SODIUM SERPL-SCNC: 140 MMOL/L — SIGNIFICANT CHANGE UP (ref 135–145)
SPECIMEN SOURCE: SIGNIFICANT CHANGE UP
TROPONIN T SERPL-MCNC: <0.01 NG/ML — SIGNIFICANT CHANGE UP (ref 0–0.01)
WBC # BLD: 8.94 K/UL — SIGNIFICANT CHANGE UP (ref 3.8–10.5)
WBC # FLD AUTO: 8.94 K/UL — SIGNIFICANT CHANGE UP (ref 3.8–10.5)

## 2022-06-08 PROCEDURE — 83735 ASSAY OF MAGNESIUM: CPT

## 2022-06-08 PROCEDURE — 96374 THER/PROPH/DIAG INJ IV PUSH: CPT

## 2022-06-08 PROCEDURE — 36415 COLL VENOUS BLD VENIPUNCTURE: CPT

## 2022-06-08 PROCEDURE — 87635 SARS-COV-2 COVID-19 AMP PRB: CPT

## 2022-06-08 PROCEDURE — G1004: CPT

## 2022-06-08 PROCEDURE — 82550 ASSAY OF CK (CPK): CPT

## 2022-06-08 PROCEDURE — 80053 COMPREHEN METABOLIC PANEL: CPT

## 2022-06-08 PROCEDURE — 99285 EMERGENCY DEPT VISIT HI MDM: CPT

## 2022-06-08 PROCEDURE — 99284 EMERGENCY DEPT VISIT MOD MDM: CPT | Mod: 25

## 2022-06-08 PROCEDURE — 84484 ASSAY OF TROPONIN QUANT: CPT

## 2022-06-08 PROCEDURE — 82553 CREATINE MB FRACTION: CPT

## 2022-06-08 PROCEDURE — 70551 MRI BRAIN STEM W/O DYE: CPT | Mod: 26,MG

## 2022-06-08 PROCEDURE — 96375 TX/PRO/DX INJ NEW DRUG ADDON: CPT

## 2022-06-08 PROCEDURE — 70551 MRI BRAIN STEM W/O DYE: CPT | Mod: MG

## 2022-06-08 PROCEDURE — 85025 COMPLETE CBC W/AUTO DIFF WBC: CPT

## 2022-06-08 RX ORDER — KETOROLAC TROMETHAMINE 30 MG/ML
15 SYRINGE (ML) INJECTION ONCE
Refills: 0 | Status: DISCONTINUED | OUTPATIENT
Start: 2022-06-08 | End: 2022-06-08

## 2022-06-08 RX ORDER — SODIUM CHLORIDE 9 MG/ML
1000 INJECTION INTRAMUSCULAR; INTRAVENOUS; SUBCUTANEOUS ONCE
Refills: 0 | Status: COMPLETED | OUTPATIENT
Start: 2022-06-08 | End: 2022-06-08

## 2022-06-08 RX ORDER — METOCLOPRAMIDE HCL 10 MG
10 TABLET ORAL ONCE
Refills: 0 | Status: COMPLETED | OUTPATIENT
Start: 2022-06-08 | End: 2022-06-08

## 2022-06-08 RX ADMIN — SODIUM CHLORIDE 1000 MILLILITER(S): 9 INJECTION INTRAMUSCULAR; INTRAVENOUS; SUBCUTANEOUS at 20:51

## 2022-06-08 RX ADMIN — Medication 104 MILLIGRAM(S): at 20:52

## 2022-06-08 RX ADMIN — Medication 15 MILLIGRAM(S): at 20:52

## 2022-06-08 NOTE — ED ADULT TRIAGE NOTE - CHIEF COMPLAINT QUOTE
Pt w/ hx of cva in november presents reportign pressure headache today. Recent hx of vision changes on friday 6/3 and on the friday of memorial day weekend, has appt w/ neurology for friday was seen at [facility] and recommended for admission and MRI overnight tuesday.

## 2022-06-08 NOTE — ED ADULT NURSE NOTE - CHIEF COMPLAINT QUOTE
Pt w/ hx of cva in november presents reportign pressure headache today. Recent hx of vision changes on friday 6/3 and on the friday of memorial day weekend, has appt w/ neurology for friday was seen at [facility] and recommended for admission and MRI overnight tuesday.

## 2022-06-08 NOTE — ED PROVIDER NOTE - CLINICAL SUMMARY MEDICAL DECISION MAKING FREE TEXT BOX
50 yo female with h/o CVA (2021) vertigo, migraine headaches, in the Er with headache. Pt reports that rahman is very similar to her typical migraine, but she became concerned that its been recurrent for the past 2 weeks, with very brief intermittent episodes of dizziness, vision changes, lightheadedness. Pt was seen yesterday at [facility] and had normal head CT and labs, was offered admission for brain MR bur she declined. Case discussed with pt's neurologist . pt with normal exam today, no focal neuro deficits, VSS, will check labs and MR brain. if no acute findings pt could be d/c home for further out pt f/.u with .

## 2022-06-08 NOTE — ED PROVIDER NOTE - ATTENDING APP SHARED VISIT CONTRIBUTION OF CARE
Borderline BP/HR, other vitals wnl. Exam as above. Well appearing. Labs grossly wnl. PA d/w Dr. Alarcon - recommended MRI and dc if no significant pathology. MRI vrad showing "IMPRESSION:  1. No acute intracranial findings.  2. Minimal scattered chronic white matter disease, a dominant focus in the  posterior right corona radiata measures 5 mm, a small focus in the   inferior  left medulla, additional foci within the right middle cerebellar peduncle   and  within the anterior right cerebellar hemisphere, probably chronic   microvascular  changes, nonacute infarcts.  "  Given IVF, toradol/reglan. Feeling much better. Vitals improved.    Discussed importance of outpt follow up and return precautions. Clinically no indication for further emergent ED workup or hospitalization at this time. Comfortable for dc, outpt f/u.

## 2022-06-08 NOTE — ED PROVIDER NOTE - OBJECTIVE STATEMENT
50 y/o F with PMH of vertigo, migraine headaches, R superior cerebellum and L occipital stroke on 11/21,  PFO s/p closure and with loop recorder presents to ED with migraine HA, pressure on the top of her head x 1 day, also reports intermittent brief left sided chest pain yesterday, on and off.  Pt reports she had  mild dizziness with movement of her eyes or head while laying in bed at approx 1 am few days ago,  had  intermittent episodes of bright lights along her peripheral vision 2 weeks ago followed by a R temporal headache.  pt also reports about 5 days days ago, she noted blurred vision described as a "kaleidoscope" without a headache. pt was seen at [facility] ER yesterday for her symptoms, had blood tests and head C scan done and was offered an admission for MRI but she couldn't stay due to some family related issues. Pt mentioned her neurologist is .

## 2022-06-08 NOTE — ED PROVIDER NOTE - MUSCULOSKELETAL, MLM
Spine and all extremities grossly appears normal, range of motion is not limited, no muscle or joint tenderness,

## 2022-06-08 NOTE — ED ADULT NURSE NOTE - OBJECTIVE STATEMENT
Patient presents to the ED complaining of a headache since today and seeing prisms in her vision since last week. Patient with a history of stroke in November. Patient also reports finding a PFO. Denies any slurred speech or weakness.

## 2022-06-08 NOTE — ED PROVIDER NOTE - PATIENT PORTAL LINK FT
You can access the FollowMyHealth Patient Portal offered by [facility] by registering at the following website: [email or web address]. By joining [facility]’s FollowMyHealth portal, you will also be able to view your health information using other applications (apps) compatible with our system.

## 2022-06-08 NOTE — ED ADULT NURSE NOTE - NSSEPSISNEWALTERMENTAL_ED_A_ED
Biopsy Method: Double edge Personna blades Medical Necessity Information: It is in your best interest to select a reason for this procedure from the list below. All of these items fulfill various CMS LCD requirements except the new and changing color options. Bill 71419 For Specimen Handling/Conveyance To Laboratory?: no Billing Type: Third-Party Bill Anesthesia Volume In Cc: - Post-Care Instructions: I reviewed with the patient in detail post-care instructions. Patient is to keep the biopsy site dry overnight, and then apply bacitracin twice daily until healed. Patient may apply hydrogen peroxide soaks to remove any crusting. Anesthesia Type: 2% Xylocaine with 1:100,000 epinephrine Path Notes (To The Dermatopathologist): Please check margins. Render Post-Care Instructions In Note?: yes Wound Care: Aquaphor Detail Level: Detailed Notification Instructions: RTC x 2 weeks for DXBX Consent was obtained from the patient. The risks and benefits to therapy were discussed in detail. Specifically, the risks of infection, scarring, bleeding, prolonged wound healing, incomplete removal, allergy to anesthesia, nerve injury and recurrence were addressed. Prior to the procedure, the treatment site was clearly identified and confirmed by the patient. All components of Universal Protocol/PAUSE Rule completed. Medical Necessity Clause: This procedure was medically necessary because the lesion that was treated was: X Size Of Lesion In Cm (Optional): 0.6 Hemostasis: Aluminum Chloride Size Of Lesion In Cm (Required): 1 No

## 2022-06-08 NOTE — ED PROVIDER NOTE - CARE PROVIDER_API CALL
Elin Alarcon)  Neurology  [address] [address]  Phone: [phone]  Fax: [phone]  Follow Up Time:

## 2022-06-08 NOTE — ED ADULT NURSE NOTE - NS TRANSFER PATIENT BELONGINGS
Spoke with the patient over the phone and explained to her that according to the provided letter from her insurance company.  Sumatriptan is on the covered medication list, but has quantity limit of 9 tablets/month.  We decided that we will stay at that 9 tablet/month for now to try it over the next several months.  If patient consistently goes over 9 tablets/month, we will submit PA request for increasing the quantity limit.  The patient verbalized understanding and will call me if she needs more than 9 tablets/month.  Ej Castanon MD.  
None

## 2022-06-13 ENCOUNTER — TRANSCRIPTION ENCOUNTER (OUTPATIENT)
Age: 52
End: 2022-06-13

## 2022-06-15 ENCOUNTER — TRANSCRIPTION ENCOUNTER (OUTPATIENT)
Age: 52
End: 2022-06-15

## 2022-06-15 ENCOUNTER — NON-APPOINTMENT (OUTPATIENT)
Age: 52
End: 2022-06-15

## 2022-06-15 ENCOUNTER — APPOINTMENT (OUTPATIENT)
Dept: HEART AND VASCULAR | Facility: CLINIC | Age: 52
End: 2022-06-15

## 2022-06-15 PROCEDURE — G2066: CPT

## 2022-06-15 PROCEDURE — 93298 REM INTERROG DEV EVAL SCRMS: CPT

## 2022-06-16 ENCOUNTER — NON-APPOINTMENT (OUTPATIENT)
Age: 52
End: 2022-06-16

## 2022-06-16 ENCOUNTER — APPOINTMENT (OUTPATIENT)
Dept: HEART AND VASCULAR | Facility: CLINIC | Age: 52
End: 2022-06-16
Payer: MEDICAID

## 2022-06-16 VITALS
OXYGEN SATURATION: 98 % | HEART RATE: 68 BPM | WEIGHT: 152 LBS | TEMPERATURE: 97.1 F | BODY MASS INDEX: 25.33 KG/M2 | HEIGHT: 65 IN | SYSTOLIC BLOOD PRESSURE: 115 MMHG | DIASTOLIC BLOOD PRESSURE: 82 MMHG

## 2022-06-16 DIAGNOSIS — R03.0 ELEVATED BLOOD-PRESSURE READING, W/OUT DIAGNOSIS OF HYPERTENSION: ICD-10-CM

## 2022-06-16 PROCEDURE — 99214 OFFICE O/P EST MOD 30 MIN: CPT | Mod: 25

## 2022-06-16 PROCEDURE — 93000 ELECTROCARDIOGRAM COMPLETE: CPT

## 2022-06-16 NOTE — HISTORY OF PRESENT ILLNESS
[FreeTextEntry1] : Ms. Agrawal is a 52yo W with CVA (rt superior cerebellum/peduncle and left occipital stroke in Nov 2021), ILR, PFO s/p PFO closure 3/23/22 who is seen for a follow-up visit. \par \par Initially seen in May 2022. At that time, ordered for ABPM due to variable BPs -- showed BPs are well controlled. \par \par Since then:\par -6/8/22 went to ER for headache, brief intermittent left sided chest pain. Per report had normal head CT and labs and told to f/u with Dr. Alarcon -- has f/u, headache now resolved\par -chest pain: started about 1.5-2 wks ago with above neuro symptoms. First time was laying down -- had sharper pain for a few seconds. Then reoccurred but was dull. Went to ER, had trop checked was negative. \par -over last few days, having it every day. Feels like dull pain, describes it as a stitch. Does not radiate. Over the area of where ILR was placed, used to be able to feel it, now having difficulty feeling it. \par No dyspnea.  Does not get worse with physical exercise, has been walking 5 miles daily without impact. \par \par CVA:\par -s/p PFO closure\par -has ILR\par -on aspirin, atorvastatin 20mg \par -Lp(a) normal\par \par Has been checking BPs at home, have been normal.\par \par March 2022 Labs\par Hb 12.8\par Hct 29.3\par Plts 252\par K 4.7\par Cr 0.97\par AST 16\par ALT 9\par Chol 114\par Trig 67\par HDL 49\par LDL 52\par TSH 2.27\par A1c 5.3%\par \par PMH/PSH:\par as above; also\par migraines\par tinnitus\par \par FH:\par no strokes\par MGM: quadruple bypass at age 60 (had DM, obesity, tob)\par mother: HTN\par father: HL\par \par SH:\par choreographer\par former tob\par no etoh\par \par ALL:\par sulfa\par \par ROS:\par no fever/chills\par no nausea/vomiting\par no chest pain/dyspnea\par occ palpitations described as being aware\par \par Lifestyle History:\par Mediterranean Diet Score (9 question survey) was 6\par (8-9: optimal, 6-7: near-optimal, 4-5: suboptimal, 0-3: markedly suboptimal)\par Exercise: Patient reports exercising at a moderate level for >150 minutes per week. \par Smoking: Former smoker, quit 28yrs ago, smoked ~4 cigs/day\par No etoh\par Stress: Patient denies any stress. \par No snoring\par \par No PCOS\par No pregnancies

## 2022-06-16 NOTE — CARDIOLOGY SUMMARY
[de-identified] : \par 6/16/22 EKG: sinus bradycardia\par 5/11/22 EKG: sinus bradycardia at 59bpm, otherwise nl  [de-identified] : \par ABPM 5/19-5/20/22: Avg /83 [de-identified] : \par 4/25/22 TTE: nl biv size and fxn, no sig valvular disease, no pericardial effusion, atrial septal occluder device with no color Doppler shunt and neg bubble study

## 2022-06-16 NOTE — DISCUSSION/SUMMARY
[FreeTextEntry1] : Ms. Agrawal is a 50yo W with CVA (rt superior cerebellum/peduncle and left occipital stroke in Nov 2021), ILR, PFO s/p PFO closure 3/23/22 who is seen for a follow-up visit.\par \par #Chest pain:\par -non-exertional, trop neg in ER, LDL at goal, Lp(a) normal, EKG today without ischemic ST deviations -- low suspicion for ischemic etiology\par -pt feels it in area of ILR and feels more musculoskeletal to her -- unable to clearly identify device on exam, but where device is possibly felt is area of her pain\par -referred to Dr. Jacques for eval as pain may be related to ILR, she will also discuss re: long-term plan for ILR\par -if symptoms worsen she will inform\par -if symptoms persist and not thought to be related to ILR, she will return and we will discuss further testing such as CCTA\par \par \par #CVA, headaches: is following with neuro \par -s/p PFO closure\par -Lp(a) normal\par -on aspirin, statin, LDL At goal\par \par #BP: controlled on ABPM and at home, no indication for pharm rx\par \par Follow-up 6 months or sooner if pain persists

## 2022-06-20 ENCOUNTER — APPOINTMENT (OUTPATIENT)
Dept: HEART AND VASCULAR | Facility: CLINIC | Age: 52
End: 2022-06-20
Payer: MEDICAID

## 2022-06-20 VITALS
BODY MASS INDEX: 25.33 KG/M2 | DIASTOLIC BLOOD PRESSURE: 90 MMHG | HEART RATE: 63 BPM | HEIGHT: 65 IN | SYSTOLIC BLOOD PRESSURE: 138 MMHG | WEIGHT: 152 LBS | TEMPERATURE: 97.6 F

## 2022-06-20 PROCEDURE — 93285 PRGRMG DEV EVAL SCRMS IP: CPT

## 2022-06-20 NOTE — STROKE CODE NOTE - NIH STROKE SCALE: 5A. MOTOR ARM, LEFT, QM
Libtayo Pregnancy And Lactation Text: This medication is contraindicated in pregnancy and when breast feeding. (0) No drift; limb holds 90 (or 45) degrees for full 10 secs

## 2022-06-21 NOTE — HISTORY OF PRESENT ILLNESS
[de-identified] : 51 year old female who had a CVA 11/2021 s/p ILR to monitor for occult atrial fibrillation.  She is s/p PFO closure with Dr. Adair on 3/23/22.  She had an episode of syncope during IV placement pre procedure that corresponded with a sinus pause (20 seconds).  No AT/AF has been identified since device placement.  She complains of discomfort at the ILR incision that radiates around her left breast.

## 2022-06-21 NOTE — PHYSICAL EXAM
[General Appearance - Well Developed] : well developed [Normal Appearance] : normal appearance [Well Groomed] : well groomed [General Appearance - Well Nourished] : well nourished [No Deformities] : no deformities [General Appearance - In No Acute Distress] : no acute distress [Heart Rate And Rhythm] : heart rate and rhythm were normal [Heart Sounds] : normal S1 and S2 [] : no respiratory distress [Respiration, Rhythm And Depth] : normal respiratory rhythm and effort [Exaggerated Use Of Accessory Muscles For Inspiration] : no accessory muscle use [Clean] : clean [Dry] : dry [Healing Well] : healing well [Palpable Crepitus] : no palpable crepitus [Bleeding] : no active bleeding [Foul Odor] : no foul smell [Purulent Drainage] : no purulent drainage [Serous Drainage] : no serous drainage [Erythema] : not erythematous [Warm] : not warm [Tender] : not tender [Indurated] : not indurated [Fluctuant] : not fluctuant

## 2022-06-21 NOTE — REVIEW OF SYSTEMS
[Fever] : no fever [Weight Gain (___ Lbs)] : no recent weight gain [Chills] : no chills [Feeling Fatigued] : not feeling fatigued [Weight Loss (___ Lbs)] : no recent weight loss [SOB] : no shortness of breath [Dyspnea on exertion] : not dyspnea during exertion [Chest Discomfort] : no chest discomfort [Palpitations] : no palpitations [Syncope] : no syncope [Negative] : Heme/Lymph

## 2022-06-21 NOTE — DISCUSSION/SUMMARY
[FreeTextEntry1] : 51 year old female who had a CVA 11/2021 s/p ILR to monitor for occult AFib.  Now s/p PFO closure with Dr. Adair.  She has had no AT/AF identified since device placement.  The ILR has migrated and I am unable to palpate the device on exam.  Given displacement of the device and discomfort to the patient, we will plan for an elective explant.  CXR PA/lat ordered to assess device location at this time.  We will consider ILR extraction under sedation in the EP lab.  All questions were answered to her apparent satisfaction.

## 2022-06-23 ENCOUNTER — OUTPATIENT (OUTPATIENT)
Dept: OUTPATIENT SERVICES | Facility: HOSPITAL | Age: 52
LOS: 1 days | End: 2022-06-23
Payer: MEDICAID

## 2022-06-23 PROCEDURE — 71046 X-RAY EXAM CHEST 2 VIEWS: CPT

## 2022-06-23 PROCEDURE — 71046 X-RAY EXAM CHEST 2 VIEWS: CPT | Mod: 26

## 2022-07-11 ENCOUNTER — RX RENEWAL (OUTPATIENT)
Age: 52
End: 2022-07-11

## 2022-07-20 ENCOUNTER — OUTPATIENT (OUTPATIENT)
Dept: OUTPATIENT SERVICES | Facility: HOSPITAL | Age: 52
LOS: 1 days | Discharge: HOME | End: 2022-07-20
Payer: MEDICAID

## 2022-07-20 LAB
HCG UR QL: NEGATIVE — SIGNIFICANT CHANGE UP
ISTAT INR: 1.1 — SIGNIFICANT CHANGE UP (ref 0.88–1.16)
ISTAT PT: 12.6 SEC — SIGNIFICANT CHANGE UP (ref 10–12.9)
ISTAT VENOUS BE: 4 MMOL/L — HIGH (ref -2–3)
ISTAT VENOUS GLUCOSE: 100 MG/DL — HIGH (ref 70–99)
ISTAT VENOUS HCO3: 31 MMOL/L — HIGH (ref 23–28)
ISTAT VENOUS HEMATOCRIT: 43 % — SIGNIFICANT CHANGE UP (ref 34.5–45)
ISTAT VENOUS HEMOGLOBIN: 14.6 GM/DL — SIGNIFICANT CHANGE UP (ref 11.5–15.5)
ISTAT VENOUS IONIZED CALCIUM: 1.23 MMOL/L — SIGNIFICANT CHANGE UP (ref 1.12–1.3)
ISTAT VENOUS PCO2: 53 MMHG — HIGH (ref 41–51)
ISTAT VENOUS PH: 7.38 — SIGNIFICANT CHANGE UP (ref 7.31–7.41)
ISTAT VENOUS PO2: <66 MMHG — LOW (ref 35–40)
ISTAT VENOUS POTASSIUM: 3.9 MMOL/L — SIGNIFICANT CHANGE UP (ref 3.5–5.3)
ISTAT VENOUS SO2: 33 % — SIGNIFICANT CHANGE UP
ISTAT VENOUS SODIUM: 142 MMOL/L — SIGNIFICANT CHANGE UP (ref 135–145)
ISTAT VENOUS TCO2: 33 MMOL/L — HIGH (ref 22–31)
POCT ISTAT CREATININE: 1 MG/DL — SIGNIFICANT CHANGE UP (ref 0.5–1.3)

## 2022-07-20 PROCEDURE — 82947 ASSAY GLUCOSE BLOOD QUANT: CPT

## 2022-07-20 PROCEDURE — 82565 ASSAY OF CREATININE: CPT

## 2022-07-20 PROCEDURE — 84132 ASSAY OF SERUM POTASSIUM: CPT

## 2022-07-20 PROCEDURE — 81025 URINE PREGNANCY TEST: CPT

## 2022-07-20 PROCEDURE — 85014 HEMATOCRIT: CPT

## 2022-07-20 PROCEDURE — 33286 RMVL SUBQ CAR RHYTHM MNTR: CPT

## 2022-07-20 PROCEDURE — 84295 ASSAY OF SERUM SODIUM: CPT

## 2022-07-20 PROCEDURE — 82330 ASSAY OF CALCIUM: CPT

## 2022-07-20 PROCEDURE — 85610 PROTHROMBIN TIME: CPT

## 2022-07-20 PROCEDURE — 82803 BLOOD GASES ANY COMBINATION: CPT

## 2022-07-22 ENCOUNTER — APPOINTMENT (OUTPATIENT)
Dept: NEUROLOGY | Facility: CLINIC | Age: 52
End: 2022-07-22

## 2022-07-23 ENCOUNTER — NON-APPOINTMENT (OUTPATIENT)
Age: 52
End: 2022-07-23

## 2022-07-25 ENCOUNTER — NON-APPOINTMENT (OUTPATIENT)
Age: 52
End: 2022-07-25

## 2022-07-25 ENCOUNTER — EMERGENCY (EMERGENCY)
Facility: HOSPITAL | Age: 52
LOS: 1 days | Discharge: HOME | End: 2022-07-25
Attending: EMERGENCY MEDICINE | Admitting: EMERGENCY MEDICINE

## 2022-07-25 ENCOUNTER — APPOINTMENT (OUTPATIENT)
Dept: HEART AND VASCULAR | Facility: CLINIC | Age: 52
End: 2022-07-25

## 2022-07-25 VITALS
OXYGEN SATURATION: 100 % | RESPIRATION RATE: 16 BRPM | HEART RATE: 79 BPM | DIASTOLIC BLOOD PRESSURE: 81 MMHG | SYSTOLIC BLOOD PRESSURE: 130 MMHG | TEMPERATURE: 98 F

## 2022-07-25 VITALS
TEMPERATURE: 98 F | SYSTOLIC BLOOD PRESSURE: 151 MMHG | OXYGEN SATURATION: 98 % | HEART RATE: 87 BPM | HEIGHT: 65 IN | RESPIRATION RATE: 16 BRPM | DIASTOLIC BLOOD PRESSURE: 111 MMHG

## 2022-07-25 DIAGNOSIS — Q21.1 ATRIAL SEPTAL DEFECT: Chronic | ICD-10-CM

## 2022-07-25 LAB
ALBUMIN SERPL ELPH-MCNC: 4 G/DL — SIGNIFICANT CHANGE UP (ref 3.4–5)
ALP SERPL-CCNC: 55 U/L — SIGNIFICANT CHANGE UP (ref 40–120)
ALT FLD-CCNC: 10 U/L — LOW (ref 12–42)
ANION GAP SERPL CALC-SCNC: 11 MMOL/L — SIGNIFICANT CHANGE UP (ref 9–16)
APTT BLD: 31.6 SEC — SIGNIFICANT CHANGE UP (ref 27.5–35.5)
AST SERPL-CCNC: 12 U/L — LOW (ref 15–37)
BASOPHILS # BLD AUTO: 0.05 K/UL — SIGNIFICANT CHANGE UP (ref 0–0.2)
BASOPHILS NFR BLD AUTO: 0.7 % — SIGNIFICANT CHANGE UP (ref 0–2)
BILIRUB SERPL-MCNC: 0.5 MG/DL — SIGNIFICANT CHANGE UP (ref 0.2–1.2)
BUN SERPL-MCNC: 15 MG/DL — SIGNIFICANT CHANGE UP (ref 7–23)
CALCIUM SERPL-MCNC: 9.1 MG/DL — SIGNIFICANT CHANGE UP (ref 8.5–10.5)
CHLORIDE SERPL-SCNC: 106 MMOL/L — SIGNIFICANT CHANGE UP (ref 96–108)
CO2 SERPL-SCNC: 27 MMOL/L — SIGNIFICANT CHANGE UP (ref 22–31)
CREAT SERPL-MCNC: 1.06 MG/DL — SIGNIFICANT CHANGE UP (ref 0.5–1.3)
D DIMER BLD IA.RAPID-MCNC: <187 NG/ML DDU — SIGNIFICANT CHANGE UP
EGFR: 63 ML/MIN/1.73M2 — SIGNIFICANT CHANGE UP
EOSINOPHIL # BLD AUTO: 0.14 K/UL — SIGNIFICANT CHANGE UP (ref 0–0.5)
EOSINOPHIL NFR BLD AUTO: 2 % — SIGNIFICANT CHANGE UP (ref 0–6)
FLUAV H1 2009 PAND RNA SPEC QL NAA+PROBE: SIGNIFICANT CHANGE UP
FLUAV H1 RNA SPEC QL NAA+PROBE: SIGNIFICANT CHANGE UP
FLUAV H3 RNA SPEC QL NAA+PROBE: SIGNIFICANT CHANGE UP
FLUAV SUBTYP SPEC NAA+PROBE: SIGNIFICANT CHANGE UP
FLUBV RNA SPEC QL NAA+PROBE: SIGNIFICANT CHANGE UP
GLUCOSE SERPL-MCNC: 151 MG/DL — HIGH (ref 70–99)
HCG UR QL: NEGATIVE — SIGNIFICANT CHANGE UP
HCT VFR BLD CALC: 42.9 % — SIGNIFICANT CHANGE UP (ref 34.5–45)
HGB BLD-MCNC: 14.1 G/DL — SIGNIFICANT CHANGE UP (ref 11.5–15.5)
IMM GRANULOCYTES NFR BLD AUTO: 0.3 % — SIGNIFICANT CHANGE UP (ref 0–1.5)
INR BLD: 1.08 — SIGNIFICANT CHANGE UP (ref 0.88–1.16)
LYMPHOCYTES # BLD AUTO: 2.76 K/UL — SIGNIFICANT CHANGE UP (ref 1–3.3)
LYMPHOCYTES # BLD AUTO: 38.5 % — SIGNIFICANT CHANGE UP (ref 13–44)
MAGNESIUM SERPL-MCNC: 1.9 MG/DL — SIGNIFICANT CHANGE UP (ref 1.6–2.6)
MCHC RBC-ENTMCNC: 29.3 PG — SIGNIFICANT CHANGE UP (ref 27–34)
MCHC RBC-ENTMCNC: 32.9 GM/DL — SIGNIFICANT CHANGE UP (ref 32–36)
MCV RBC AUTO: 89.2 FL — SIGNIFICANT CHANGE UP (ref 80–100)
MONOCYTES # BLD AUTO: 0.33 K/UL — SIGNIFICANT CHANGE UP (ref 0–0.9)
MONOCYTES NFR BLD AUTO: 4.6 % — SIGNIFICANT CHANGE UP (ref 2–14)
NEUTROPHILS # BLD AUTO: 3.87 K/UL — SIGNIFICANT CHANGE UP (ref 1.8–7.4)
NEUTROPHILS NFR BLD AUTO: 53.9 % — SIGNIFICANT CHANGE UP (ref 43–77)
NRBC # BLD: 0 /100 WBCS — SIGNIFICANT CHANGE UP (ref 0–0)
PLATELET # BLD AUTO: 248 K/UL — SIGNIFICANT CHANGE UP (ref 150–400)
POTASSIUM SERPL-MCNC: 3 MMOL/L — LOW (ref 3.5–5.3)
POTASSIUM SERPL-SCNC: 3 MMOL/L — LOW (ref 3.5–5.3)
PROT SERPL-MCNC: 7.3 G/DL — SIGNIFICANT CHANGE UP (ref 6.4–8.2)
PROTHROM AB SERPL-ACNC: 12.5 SEC — SIGNIFICANT CHANGE UP (ref 10.5–13.4)
RAPID RVP RESULT: SIGNIFICANT CHANGE UP
RBC # BLD: 4.81 M/UL — SIGNIFICANT CHANGE UP (ref 3.8–5.2)
RBC # FLD: 12.9 % — SIGNIFICANT CHANGE UP (ref 10.3–14.5)
RSV RNA SPEC QL NAA+PROBE: SIGNIFICANT CHANGE UP
SARS-COV-2 RNA SPEC QL NAA+PROBE: SIGNIFICANT CHANGE UP
SODIUM SERPL-SCNC: 144 MMOL/L — SIGNIFICANT CHANGE UP (ref 132–145)
TROPONIN I, HIGH SENSITIVITY RESULT: <4 NG/L — SIGNIFICANT CHANGE UP
WBC # BLD: 7.17 K/UL — SIGNIFICANT CHANGE UP (ref 3.8–10.5)
WBC # FLD AUTO: 7.17 K/UL — SIGNIFICANT CHANGE UP (ref 3.8–10.5)

## 2022-07-25 PROCEDURE — G2066: CPT

## 2022-07-25 PROCEDURE — 71046 X-RAY EXAM CHEST 2 VIEWS: CPT | Mod: 26

## 2022-07-25 PROCEDURE — 93010 ELECTROCARDIOGRAM REPORT: CPT

## 2022-07-25 PROCEDURE — 71275 CT ANGIOGRAPHY CHEST: CPT | Mod: 26

## 2022-07-25 PROCEDURE — 93298 REM INTERROG DEV EVAL SCRMS: CPT

## 2022-07-25 PROCEDURE — 99284 EMERGENCY DEPT VISIT MOD MDM: CPT | Mod: 25

## 2022-07-25 RX ORDER — IPRATROPIUM/ALBUTEROL SULFATE 18-103MCG
3 AEROSOL WITH ADAPTER (GRAM) INHALATION
Refills: 0 | Status: COMPLETED | OUTPATIENT
Start: 2022-07-25 | End: 2022-07-25

## 2022-07-25 RX ORDER — SODIUM CHLORIDE 9 MG/ML
1000 INJECTION INTRAMUSCULAR; INTRAVENOUS; SUBCUTANEOUS ONCE
Refills: 0 | Status: COMPLETED | OUTPATIENT
Start: 2022-07-25 | End: 2022-07-25

## 2022-07-25 RX ORDER — IPRATROPIUM/ALBUTEROL SULFATE 18-103MCG
3 AEROSOL WITH ADAPTER (GRAM) INHALATION
Refills: 0 | Status: DISCONTINUED | OUTPATIENT
Start: 2022-07-25 | End: 2022-07-25

## 2022-07-25 RX ADMIN — Medication 60 MILLIGRAM(S): at 08:39

## 2022-07-25 RX ADMIN — Medication 3 MILLILITER(S): at 07:41

## 2022-07-25 RX ADMIN — Medication 3 MILLILITER(S): at 07:01

## 2022-07-25 RX ADMIN — Medication 3 MILLILITER(S): at 07:21

## 2022-07-25 RX ADMIN — SODIUM CHLORIDE 2000 MILLILITER(S): 9 INJECTION INTRAMUSCULAR; INTRAVENOUS; SUBCUTANEOUS at 08:40

## 2022-07-25 NOTE — ED PROVIDER NOTE - PATIENT PORTAL LINK FT
You can access the FollowMyHealth Patient Portal offered by [facility] by registering at the following website: [email or web address]. By joining [employer]’s FollowMyHealth portal, you will also be able to view your health information using other applications (apps) compatible with our system.

## 2022-07-25 NOTE — ED ADULT TRIAGE NOTE - CHIEF COMPLAINT QUOTE
Pt with complaint of "having difficulty taking a deep breath since about June. It's like I can't take a full breath." Pt reports having a  stroke last year and had a PFO closed in March. States she also had a loop recorder taken out recently as was told there was no events to report. Pt also reports she had recent blood work taken and there were some abnormal lab results which she googled.

## 2022-07-25 NOTE — ED ADULT NURSE NOTE - NSIMPLEMENTINTERV_GEN_ALL_ED
Implemented All Universal Safety Interventions:  [place] to call system. Call bell, personal items and telephone within reach. Instruct patient to call for assistance. Room bathroom lighting operational. Non-slip footwear when patient is off stretcher. Physically safe environment: no spills, clutter or unnecessary equipment. Stretcher in lowest position, wheels locked, appropriate side rails in place.

## 2022-07-25 NOTE — ED PROVIDER NOTE - NS_EDPROVIDERDISPOUSERTYPE_ED_A_ED
LC revisited mother. Mother resting in bed. Baby in nursery. Instructed mother to call for breastfeeding assistance. Attending Attestation (For Attendings USE Only)...

## 2022-07-25 NOTE — ED PROVIDER NOTE - OBJECTIVE STATEMENT
52 F presenting with SOB- like she can't fill her lungs completely sine June. It was worse yesterday and overnight so she came in. She initially though that it might be discomfort from a Loop recorder that had migrated, She just had it removed and the sx are the same. The loop recorded was placed as she had a cerebellar CVA in march and was found to have had a PFO. The PFO was closed. She had a neg DVT study and a neg hypercoag DAVSI. The loop recorder to eval for AF (which was not found).

## 2022-07-25 NOTE — ED PROVIDER NOTE - CLINICAL SUMMARY MEDICAL DECISION MAKING FREE TEXT BOX
Patient presenting with SOB 2 2 mos, a bit worse yesterday. Complicated recent PMHx, but also has had very lose fu and major vail recently. On ASA. Given wheezes with cough will start with a CXR and trial of nebs. if no help recommended a CTA. Patient presenting with SOB 2 2 mos, a bit worse yesterday. Complicated recent PMHx, but also has had very lose fu and major vail recently. On ASA. Given wheezes with cough will start with a CXR and trial of nebs. if no help recommended a CTA.    Negative imaging for PE, no e/o infectious process. No increased work of breathing, VSS. d/c home to f/u with pulm. given return precautions and anticipatory guidance.

## 2022-07-25 NOTE — ED PROVIDER NOTE - NSFOLLOWUPINSTRUCTIONS_ED_ALL_ED_FT
Shortness of Breath, Adult      Shortness of breath is when a person has trouble breathing enough air or when a person feels like she or he is having trouble breathing in enough air. Shortness of breath could be a sign of a medical problem.      Follow these instructions at home:     •Pay attention to any changes in your symptoms.      • Do not use any products that contain nicotine or tobacco, such as cigarettes, e-cigarettes, and chewing tobacco.       • Do not smoke. Smoking is a common cause of shortness of breath. If you need help quitting, ask your health care provider.    •Avoid things that can irritate your airways, such as:  •Mold.      •Dust.      •Air pollution.      •Chemical fumes.      •Things that can cause allergy symptoms (allergens), if you have allergies.        •Keep your living space clean and free of mold and dust.      •Rest as needed. Slowly return to your usual activities.      •Take over-the-counter and prescription medicines only as told by your health care provider. This includes oxygen therapy and inhaled medicines.      •Keep all follow-up visits as told by your health care provider. This is important.        Contact a health care provider if:    •Your condition does not improve as soon as expected.      •You have a hard time doing your normal activities, even after you rest.      •You have new symptoms.        Get help right away if:    •Your shortness of breath gets worse.      •You have shortness of breath when you are resting.      •You feel light-headed or you faint.      •You have a cough that is not controlled with medicines.      •You cough up blood.      •You have pain with breathing.      •You have pain in your chest, arms, shoulders, or abdomen.      •You have a fever.      •You cannot walk up stairs or exercise the way that you normally do.      These symptoms may represent a serious problem that is an emergency. Do not wait to see if the symptoms will go away. Get medical help right away. Call your local emergency services (911 in the [place] Do not drive yourself to the hospital.       Summary    •Shortness of breath is when a person has trouble breathing enough air. It can be a sign of a medical problem.      •Avoid things that irritate your lungs, such as smoking, pollution, mold, and dust.      •Pay attention to changes in your symptoms and contact your health care provider if you have a hard time completing daily activities because of shortness of breath.

## 2022-07-25 NOTE — ED PROVIDER NOTE - GASTROINTESTINAL NEGATIVE STATEMENT, MLM
Patient:   SONDRA MAURER            MRN: [id number]            FIN: [id number]              Age:   79 years     Sex:  FEMALE     :  41   Associated Diagnoses:   None   Author:   LOGAN BILLY Cardiology Consult  Chief complaint: [ ]   Consulting service: [ ]  Continuity cardilogist:   HPI: [ ]   ROS: 14 organ systems are negative, except as noted in the HPI.   PMH:   Iron deficiency  Lumbar spinal stenosis  Abdominal pain  Depression (disease)  Factor V Leiden mutation  Hip pain, right  Malaise  Benign Essential Hypertension  GERD (gastroesophageal reflux disease)  LBP (low back pain)  Age related macular degeneration  Anemia  Arteriovenous malformation of digestive system vessel  Cervical radiculopathy  Cervical spondylosis with radiculopathy  Chronic blood loss anemia  Chronic pain  Clotting disorder  Coronary artery disease  Deep vein thrombosis (DVT)  Diet-controlled type 2 diabetes mellitus  H/O: blood transfusion  History of DVT in adulthood  History of pulmonary embolus (PE)  Osteoarthritis  Pulmonary embolism  Recurrent falls  Risk factors for obstructive sleep apnea  Shoulder pain, right  Urinary frequency  Urinary retention  Visual impairment  PSH:   No qualifying data available.  Allergies (11) Active Reaction  Amaryl Rash  Antihistamine Patient Unsure  bisoprolol Patient Unsure  fluorescein ophthalmic 2% solution Burning  Fluzone Swelling  Levaquin Rash, rash & nausea  Lyrica Patient Unsure  Norco None Documented  QuiNIDine Sulfate Patient Unsure  Reglan Patient Unsure  Vicodin Patient Unsure  Home Medications (23) Active  apixaBAN oral 5 mg tablet 5 mg = 1 tab, Oral, BID  ARIPiprazole 2 mg, Oral, Daily  Artificial Tears See Instructions  atorvastatin oral 10 mg tablet 10 mg = 1 tab, Oral, Daily  buPROPion 24 hour extended release 150 mg, Oral, Daily  Carafate oral 1,000 mg tablet 1,000 mg = 1 tab, Oral, QID  dextromethorphan-guaifenesin 10 mg-100 mg/10 mL oral liquid 15 mL,  Oral, Q6H  ferrous sulfate oral 325 mg tablet [65 mg iron] (Feosol) 325 mg = 1 tab, Oral, BID  Florastor 250 mg oral capsule 250 mg = 1 cap, PRN, Oral, BID  folic acid (vitamin B9) oral 1 mg tablet 1 mg = 1 tab, Oral, Daily  furosemide oral 40 mg tablet 40 mg = 1 tab, Oral, Daily  gabapentin 600 mg oral tablet 600 mg = 1 tab, Oral, TID  Incruse Ellipta inhaler oral 62.5 mcg/puff powder 1 inhalation, Inhaled, Daily  insulin lispro (HumaLOG) dose correction 1 unit, Subcutaneous  lidocaine 5% topical patch 1 patch, Topical, Daily  miconazole 2% topical spray 1 spray, Topical, BID  midodrine oral 5 mg tablet (ProAmatine) 5 mg = 1 tab, Oral, TID  potassium CHLORIDE oral 10 mEq ER tablet (KCl / K-Dur) 10 mEq = 1 tab, Oral, Daily  potassium CHLORIDE oral 20 mEq ER tablet (KCl / K-Dur) 20 mEq = 1 tab, Oral, Daily  ProAir HFA 90 mcg/inh inhalation aerosol 1 puff, PRN, Inhaled, Q6H  Protonix oral 40 mg DR tablet 40 mg = 1 tab, Oral, Daily  traMADol oral 50 mg tablet 50 mg = 1 tab, PRN, Oral, Q12H  Tylenol 325 mg oral tablet 650 mg = 2 tab, PRN, Oral, Q4H  Medications (20) Active  Scheduled: (13)  ApixaBAN 5 mg tab  5 mg 1 tab, Oral, BID  ARIPiprazole 2 mg tab  2 mg 1 tab, Oral, Daily  Atorvastatin 10 mg tab  10 mg 1 tab, Oral, Q Bedtime  BuPROPion 150 mg SR tab 12 hr  150 mg 1 tab, Oral, Daily  Famotidine 20 mg tab  20 mg 1 tab, Oral, Q Bedtime  Ferrous sulfate 325 mg tab [Fe 65 mg]  325 mg 1 tab, Oral, BID  Folic acid 1 mg tab  1 mg 1 tab, Oral, Daily  Gabapentin 300 mg cap  600 mg 2 cap, Oral, TID  Midodrine 5 mg tab  5 mg 1 tab, Oral, TID  Pantoprazole 40 mg DR tab  40 mg 1 tab, Oral, Daily  Polyvinyl alcohol tears ophth soln 15 mL  1 drop, Each Eye, TID  Sucralfate 1,000 mg tab  1,000 mg 1 tab, Oral, QID  Umeclidinium 62.5 mcg inhalation powder 7s  1 puff, MDI/DPI, Daily  Continuous: (0)  PRN: (7)  Acetaminophen 325 mg tab  650 mg 2 tab, Oral, Q6H  Albuterol HFA 90 mcg oral MDI 8.5 gm  180 mcg 2 puff, MDI/DPI, Q6H   HydrALAZINE 25 mg tab  25 mg 1 tab, Oral, Q8H  Milk of magnesia 8% 30 mL oral susp UD  30 mL, Oral, QID  Ondansetron 4 mg/2 mL inj SDV  4 mg 2 mL, Slow IV Push, Q8H  Senna-docusate sodium 8.6-50 mg tab  1 tab, Oral, Q Bedtime  TraMADol 50 mg tab  50 mg 1 tab, Oral, Q12H  FH:   FATHER: Blood clotting disorder; Pulmonary embolism  CHILD: DVT  CHILD: Blood clotting disorder; Factor deficiency, coagulation    SH:   Alcohol  Details: Use: None.  Exercise  Details: Exercise: Occasionally.  Times Per Week: 3-4 times/week.; Comment(s): Therapy  Details: Excercise: Never.  Home/Environment  Details: Alcohol Abuse in Household: No.  Substance Abuse in Household: No.  Smoker in Household: No.  Sexual  Details: Sexual orientation: Straight or heterosexual.  Gender Identity: Identifies as female.  Gender on Ins: Female.  Preferred Pronouns: Female.  Substance Abuse  Details: None  Tobacco  Details: Smoked/Smokeless Tobacco Last 30 Days: No.  Smoking Tobacco Use: Former smoker.  Smokeless Tobacco Use Never.  Type: Cigarettes.  Cigarette Packs/Day: 5 or More Cigarettes <1 Pack Per Day.  Stopped Age: 76 Years.  Details: Smoked/Smokeless Tobacco Last 30 Days: No.  Smoking Tobacco Use: Former smoker.  Smokeless Tobacco Use Never.  Details: Smoked/Smokeless Tobacco Last 30 Days: Yes.  Use: Current some day smoker.  Type: Cigarettes.  Cigarette Packs/Day: 4 Cigarettes or Less Per Day.  Details: Smoked/Smokeless Tobacco Last 30 Days: Yes.  Use: Current some day smoker.  Type: Cigarettes.  Cigarette Packs/Day: 4 Cigarettes or Less Per Day.; Comment(s): per patient, smokes 1 cigarette 2 times a week  pt stated she had 1 stick of cigarette 3 weeks ago given by a friend but said she  quit smoking 10 yrs ago.  Details: Used in Last 12 Months: Yes.  Use: Current every day smoker.  Type: Cigarettes.  Cigarette Packs/Day: 1-3 sticks a wk..  Cultural/Orthodoxy Practices  Details: Orthodoxy or Cultural Practices: Jewish.  Physical exam:    Vitals between:   02-OCT-2020 12:21:24   TO   03-OCT-2020 12:21:24                   LAST RESULT MINIMUM MAXIMUM  Temperature 37.3 35.9 37.3  Heart Rate 78 71 91  Respiratory Rate 16 16 20  NISBP           95 95 158  NIDBP           58 51 129  NIMBP           72 66 139  SpO2                    97 94 99  Fluid I/O Balance   Overload % is: 0.000%  Intake Value is : 0.000 mL   Output Value is : 0.000 mL   Med Dosing Weight is : 96.000 kg     Dosing Weight:   96.0 kg    10/02/2020 22:35  Most Recent Clinical Weight:   96.0  kg    10/02/2020 22:35  Constitutional: comfortable and in no acute distress, well-nourished  Eyes: no xanthelasma, anicteric sclera  ENT: moist mucus membranes, no pallor or cyanosis  Neck: JVP non-elevated and nondistended  CV: normal rate and regular rhythm, normal S1 and S2, no murmurs/rubs/gallops, JVP non-elevated, no extremity edema, intact PT pulses  Resp: normal work of breathing, no adventitial breath sounds  Abd: soft, nontender, and nondistended, no hepatomegaly  Skin: warm and dry  MSK: no digital clubbing or cyanosis  Neuro: alert and oriented, no gross focal deficits  Psych: appropriate affect, normal judgment and insight  Labs:   Labs between:  02-OCT-2020 12:21 to 03-OCT-2020 12:21  CBC:                 WBC  HgB  Hct  Plt  MCV  RDW   03-OCT-2020 5.1  (L) 8.0  (L) 30.0  239  (L) 75.4  (H) 18.6   02-OCT-2020 7.6  (L) 7.9  (L) 29.5  262  (L) 75.8  (H) 18.6   DIFF:                 Seg  Neutroph//ABS  Lymph//ABS  Mono//ABS  EOS/ABS  03-OCT-2020 NOT APPLICABLE  62 // 3.1 27 // 1.4 7 // 0.4 4 // 0.2  02-OCT-2020 NOT APPLICABLE  62 // 4.7 25 // 1.9 10 // 0.8 3 // 0.3  BMP:                 Na  Cl  BUN  Glu   03-OCT-2020 140  107  (H) 25  (H) 122                              K  CO2  Cr  Ca                              3.9  25  (H) 1.28  9.2   BMP:                 Na  Cl  BUN  Glu   02-OCT-2020 138  106  (H) 25  (H) 108                              K  CO2  Cr  Ca                               no abdominal pain, no bloating, no constipation, no diarrhea, no nausea and no vomiting.

## 2022-07-25 NOTE — ED PROVIDER NOTE - PHYSICAL EXAMINATION
VITAL SIGNS: I have reviewed nursing notes and confirm.  CONSTITUTIONAL: Well-developed; well-nourished; in no acute distress.  SKIN: Skin is warm and dry, no acute rash.  EYES: Pupils round bilaterally, 3mm; conjunctiva and sclera clear.  ENT: No nasal discharge; airway clear, MMM.  NECK: Supple; non tender.  CARD: S1+ exp wheezes with cough, otherwise no wheezes, rales or rhonchi.  ABD: Soft; non-distended; non-tender  EXT: Normal ROM. No clubbing, cyanosis or edema.  NEURO: Alert, oriented. Grossly unremarkable.  PSYCH: Cooperative, appropriate. Mood and affect wnl.

## 2022-07-27 DIAGNOSIS — Z20.822 CONTACT WITH AND (SUSPECTED) EXPOSURE TO COVID-19: ICD-10-CM

## 2022-07-27 DIAGNOSIS — Z88.2 ALLERGY STATUS TO SULFONAMIDES: ICD-10-CM

## 2022-07-27 DIAGNOSIS — R05.9 COUGH, UNSPECIFIED: ICD-10-CM

## 2022-07-27 DIAGNOSIS — R06.02 SHORTNESS OF BREATH: ICD-10-CM

## 2022-07-27 DIAGNOSIS — R06.2 WHEEZING: ICD-10-CM

## 2022-07-27 DIAGNOSIS — Z79.82 LONG TERM (CURRENT) USE OF ASPIRIN: ICD-10-CM

## 2022-08-01 ENCOUNTER — TRANSCRIPTION ENCOUNTER (OUTPATIENT)
Age: 52
End: 2022-08-01

## 2022-08-04 ENCOUNTER — TRANSCRIPTION ENCOUNTER (OUTPATIENT)
Age: 52
End: 2022-08-04

## 2022-08-04 ENCOUNTER — APPOINTMENT (OUTPATIENT)
Dept: NEUROLOGY | Facility: CLINIC | Age: 52
End: 2022-08-04

## 2022-08-04 VITALS
OXYGEN SATURATION: 96 % | HEART RATE: 66 BPM | SYSTOLIC BLOOD PRESSURE: 120 MMHG | DIASTOLIC BLOOD PRESSURE: 83 MMHG | HEIGHT: 65 IN | TEMPERATURE: 98.7 F | BODY MASS INDEX: 25.33 KG/M2 | WEIGHT: 152 LBS

## 2022-08-04 PROCEDURE — 99213 OFFICE O/P EST LOW 20 MIN: CPT

## 2022-08-04 NOTE — HISTORY OF PRESENT ILLNESS
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 Workup was unrevealing apart from PFO which she underwent closure 3/2021. Most recent ILR interrogation showed no AF. She remains on statin, aspirin therapy. \par \par The patient was seen in the ED in July after experiencing several symptoms over a week period: two episodes of visual disturbance ("black/white stripes in the upper fields of her vision and prism-like changes), vertigo, and severe migraine.  MRI showed no acute findings but did she a medullary lesion ([id number], no restricted diffusion) that was not appreciated on prior MRI's.  Of note, the patient denies any new symptoms part from those listed above. She has been a bit SOB lately and had an unremarkable CTA chest.  She also complains of neck pain.\par \par

## 2022-08-04 NOTE — PHYSICAL EXAM
[FreeTextEntry1] : Alert.  Fully oriented.  Speech and language are intact.  Cranial nerves II-XII are intact.  Motor exam reveals intact strength with individual muscle testing in bilateral upper and lower extremities.  Tone is normal.  Reflexes are normal.  Toes are downgoing.  Sensation is intact to light touch in distal extremities.  Finger-to-nose and heel-to-shin are intact.  Rapid alternating movements are normal in the upper and lower extremities.  Gait is normal. \par

## 2022-08-04 NOTE — ASSESSMENT
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 Workup was unrevealing apart from PFO which she underwent closure 3/2021.\par \par We will repeat MRI brain to ensure no new lesions have formed given the ?new lesion of the midbrain (versus it was not previously seen on MRI's due to slice capture).  MRI C spine as well to evaluate neck pain and to ensure no additional lesions to suggest diagnosis other than stroke (MS) which seems less likely. I have also discussed case with our MS expert who agrees.  RTC in 3 months. Will call with results.

## 2022-08-08 ENCOUNTER — OUTPATIENT (OUTPATIENT)
Dept: OUTPATIENT SERVICES | Facility: HOSPITAL | Age: 52
LOS: 1 days | End: 2022-08-08

## 2022-08-08 ENCOUNTER — APPOINTMENT (OUTPATIENT)
Dept: HEART AND VASCULAR | Facility: CLINIC | Age: 52
End: 2022-08-08

## 2022-08-08 ENCOUNTER — APPOINTMENT (OUTPATIENT)
Dept: MRI IMAGING | Facility: CLINIC | Age: 52
End: 2022-08-08

## 2022-08-08 VITALS
TEMPERATURE: 95.5 F | HEIGHT: 65 IN | HEART RATE: 68 BPM | BODY MASS INDEX: 25.33 KG/M2 | SYSTOLIC BLOOD PRESSURE: 124 MMHG | WEIGHT: 152 LBS | DIASTOLIC BLOOD PRESSURE: 82 MMHG

## 2022-08-08 DIAGNOSIS — Q21.1 ATRIAL SEPTAL DEFECT: Chronic | ICD-10-CM

## 2022-08-08 PROCEDURE — 70551 MRI BRAIN STEM W/O DYE: CPT | Mod: 26

## 2022-08-08 PROCEDURE — 99212 OFFICE O/P EST SF 10 MIN: CPT

## 2022-08-08 NOTE — DISCUSSION/SUMMARY
[FreeTextEntry1] : 52 year old female who had a CVA 11/2021 s/p ILR to monitor for occult AFib.  Now s/p PFO closure with Dr. Adair.  She has had no AT/AF identified since device placement.  The ILR was laterally displaced; now s/p exploration and extraction under moderate sedation 7/20/22.  The incision is healing well.  She will return for follow-up as needed.

## 2022-08-08 NOTE — HISTORY OF PRESENT ILLNESS
[de-identified] : 51 year old female who had a CVA 11/2021 s/p ILR to monitor for occult atrial fibrillation.  She is s/p PFO closure with Dr. Adair on 3/23/22.  She had an episode of syncope during IV placement pre procedure that corresponded with a sinus pause (20 seconds).  No AT/AF has been identified since device placement.  The ILR was laterally displaced; now s/p exploration and extraction under moderate sedation 7/20/22.

## 2022-08-11 ENCOUNTER — TRANSCRIPTION ENCOUNTER (OUTPATIENT)
Age: 52
End: 2022-08-11

## 2022-08-11 ENCOUNTER — NON-APPOINTMENT (OUTPATIENT)
Age: 52
End: 2022-08-11

## 2022-08-15 ENCOUNTER — OUTPATIENT (OUTPATIENT)
Dept: OUTPATIENT SERVICES | Facility: HOSPITAL | Age: 52
LOS: 1 days | End: 2022-08-15

## 2022-08-15 ENCOUNTER — APPOINTMENT (OUTPATIENT)
Dept: MRI IMAGING | Facility: CLINIC | Age: 52
End: 2022-08-15

## 2022-08-15 ENCOUNTER — RESULT REVIEW (OUTPATIENT)
Age: 52
End: 2022-08-15

## 2022-08-15 DIAGNOSIS — Q21.1 ATRIAL SEPTAL DEFECT: Chronic | ICD-10-CM

## 2022-08-15 PROCEDURE — 72156 MRI NECK SPINE W/O & W/DYE: CPT | Mod: 26

## 2022-08-17 ENCOUNTER — APPOINTMENT (OUTPATIENT)
Dept: MRI IMAGING | Facility: CLINIC | Age: 52
End: 2022-08-17

## 2022-08-19 ENCOUNTER — NON-APPOINTMENT (OUTPATIENT)
Age: 52
End: 2022-08-19

## 2022-08-29 ENCOUNTER — APPOINTMENT (OUTPATIENT)
Dept: HEART AND VASCULAR | Facility: CLINIC | Age: 52
End: 2022-08-29

## 2022-09-12 ENCOUNTER — APPOINTMENT (OUTPATIENT)
Dept: PHYSICAL MEDICINE AND REHAB | Facility: CLINIC | Age: 52
End: 2022-09-12

## 2022-09-12 VITALS — HEIGHT: 65 IN | BODY MASS INDEX: 25.33 KG/M2 | WEIGHT: 152 LBS

## 2022-09-12 PROCEDURE — 99203 OFFICE O/P NEW LOW 30 MIN: CPT

## 2022-09-12 NOTE — HISTORY OF PRESENT ILLNESS
[FreeTextEntry1] : 09/12/2022: Ms. RUMA SARABIA is a very pleasant 52 year female who comes in for evaluation of Bilateral Shoulder Pain, L>R, that has been ongoing for 1.5 years without any specific injury or inciting event. Patient has tried PT and Injections, which did help temporarily. The pain is located primarily L>R shoulder non-radiating intermittent and described as achy, dull . The pain is rated 9/10 and ranges from 7-9/10. The patient's symptoms are aggravated by jesus movement of the shoulder, reaching out, lying on the the sides and alleviated by nothing. The patient works as a Choreographer which consists of standing, bending, movement of the body. The patient denies any night pain, numbness/tingling, weakness, or bowel/bladder dysfunction. The patient has no other complaints at this time.\par

## 2022-09-12 NOTE — ASSESSMENT
[FreeTextEntry1] : Impression:\par 1. LEFT Shoulder Impingement/RTC Tendinopathy\par 2. LEFT Biceps Tendinopathy\par 3. RIGHT Shoulder Impingement\par \par Plan: The history and physical examination were reviewed. The diagnosis was discussed with the patient along with treatment options including physical therapy, oral medication, interventional procedures, and surgery; as well as alternative therapeutics such as acupuncture and massage. We also discussed the importance of activity modification, ergonomics, and biomechanics in the treatment of the current treatment and long term management of the condition. I am recommending that the patient participate in a course of physical therapy. We emphasized the importance of the home exercise program. The patient was educated on red flag symptoms and was instructed to call the office should the current condition worsen or new symptoms develop. The patient will also obtain prior records of imaging performed, MRI/XR for next encounter. The patient will follow up in 6-8 weeks. The patient expressed verbal understanding and is in agreement with the plan of care. All of the patient's questions and concerns were addressed during today's visit.

## 2022-09-12 NOTE — PHYSICAL EXAM
[FreeTextEntry1] : SHOULDER\par GEN: AAOx3, NAD.\par INSP: No Atrophy, Bony Deformity, Swelling. Shoulder height symmetric. Normal Thoracic Kyphosis.\par Cervical ROM: Full and pain free.\par Shoulder AROM: Limited L-ABd/Flex (+) pain. Painful Arc (+) Left. PROM IR/ER full and pain free. \par PALP: No tenderness anterior, lateral, or posterior compartments.\par STRENGTH: 5/5 bilateral shoulder abductors, internal rotation, external rotation, elbow flexors, elbow extensors, wrist extensors, long finger flexors, hand intrinsics, .\par TONE: Normal, No clonus.\par SENSATION:  Normal to light touch in the bilateral upper extremities.\par REFLEXES: 2+ symmetric biceps, triceps, brachioradialis, pronator teres. Palm negative bilaterally.\par SPECIAL: Neers, Najera, Dhaval, Empty Can (+) L>R, Speed’s (+) L. Drop arm, Spurling's negative bilaterally.\par  \par

## 2022-09-26 ENCOUNTER — FORM ENCOUNTER (OUTPATIENT)
Age: 52
End: 2022-09-26

## 2022-10-03 ENCOUNTER — APPOINTMENT (OUTPATIENT)
Dept: HEART AND VASCULAR | Facility: CLINIC | Age: 52
End: 2022-10-03

## 2022-10-04 ENCOUNTER — APPOINTMENT (OUTPATIENT)
Dept: PULMONOLOGY | Facility: CLINIC | Age: 52
End: 2022-10-04

## 2022-10-04 VITALS
HEIGHT: 65 IN | TEMPERATURE: 97.9 F | SYSTOLIC BLOOD PRESSURE: 120 MMHG | BODY MASS INDEX: 25.33 KG/M2 | WEIGHT: 152 LBS | HEART RATE: 78 BPM | DIASTOLIC BLOOD PRESSURE: 87 MMHG | OXYGEN SATURATION: 98 %

## 2022-10-04 PROCEDURE — 99203 OFFICE O/P NEW LOW 30 MIN: CPT

## 2022-10-04 RX ORDER — AMOXICILLIN 500 MG/1
500 TABLET, FILM COATED ORAL
Qty: 4 | Refills: 4 | Status: DISCONTINUED | COMMUNITY
Start: 2022-05-16 | End: 2022-10-04

## 2022-10-04 NOTE — REVIEW OF SYSTEMS
[Palpitations] : palpitations [Negative] : Endocrine [Cough] : no cough [Chest Tightness] : no chest tightness [Sputum] : no sputum [Pleuritic Pain] : no pleuritic pain [SOB on Exertion] : no sob on exertion [Edema] : no edema [Orthopnea] : no orthopnea [PND] : no PND

## 2022-10-04 NOTE — ASSESSMENT
[FreeTextEntry1] : Reviewed:\par CTA chest 7/25/22: No PE, normal lung parenchyma\par \par Referred by Dr. Alarcon. Symptoms of not being able to take a deep breath are likely related to humidity vs perimenopausal. No further workup for now given normal lung parenchyma on chest CT, resolution of symptoms, and normal physical exam. While she does have a reason for embolic stroke with a recent diagonsis of PFO (now closed) we still need to rule out SDB rosalee with a history of poor sleep quality with frequent awakenings and reported palpitations. Referred to the [facility] for a PSG. Follow up after PSG is resulted.

## 2022-10-04 NOTE — PHYSICAL EXAM
[No Acute Distress] : no acute distress [Normal Oropharynx] : normal oropharynx [Normal Appearance] : normal appearance [Normal Rate/Rhythm] : normal rate/rhythm [Normal S1, S2] : normal s1, s2 [No Murmurs] : no murmurs [No Resp Distress] : no resp distress [Clear to Auscultation Bilaterally] : clear to auscultation bilaterally [No Abnormalities] : no abnormalities [Normal Gait] : normal gait [No Clubbing] : no clubbing [Normal Affect] : normal affect

## 2022-10-04 NOTE — HISTORY OF PRESENT ILLNESS
[Never] : never [TextBox_4] : Had a stroke in 11/21 (cerebellar, left occipital, brainstem), found to have a PFO s/p closure in 5/22. During the summer, intermittently had sensation that she couldn't take a deep breath. Less common now, may be anxiety related. No symptoms at night. Works 3-6 miles a day without difficulty. Reports she is perimenopausal and symptoms started around the same time as hot flashes. No history of asthma/lung disease. Works as a dance choreographer. No environmental exposure, no pets/birds. She reports worse sleep overall since her stroke. She goes to bed at 2am, gets 8 hours, wakes up 2-3 times a night to use the bathroom. In evening can fall asleep while watching TV but not during the day.  [ESS] : 8

## 2022-10-04 NOTE — CONSULT LETTER
[Dear  ___] : Dear  [unfilled], [Consult Letter:] : I had the pleasure of evaluating your patient, [unfilled]. [Please see my note below.] : Please see my note below. [Consult Closing:] : Thank you very much for allowing me to participate in the care of this patient.  If you have any questions, please do not hesitate to contact me. [Sincerely,] : Sincerely, [FreeTextEntry3] : Pao Roth MD\par \par [facility] [employer] [facility]\par Pulmonary, Critical Care, and Sleep Medicine\par  [DrVitaliy  ___] : Dr. LAWSON

## 2022-10-12 ENCOUNTER — APPOINTMENT (OUTPATIENT)
Dept: HEART AND VASCULAR | Facility: CLINIC | Age: 52
End: 2022-10-12

## 2022-10-12 ENCOUNTER — NON-APPOINTMENT (OUTPATIENT)
Age: 52
End: 2022-10-12

## 2022-10-12 VITALS
BODY MASS INDEX: 25.16 KG/M2 | WEIGHT: 151 LBS | OXYGEN SATURATION: 99 % | TEMPERATURE: 98.2 F | DIASTOLIC BLOOD PRESSURE: 76 MMHG | HEART RATE: 75 BPM | HEIGHT: 65 IN | SYSTOLIC BLOOD PRESSURE: 110 MMHG

## 2022-10-12 DIAGNOSIS — R00.2 PALPITATIONS: ICD-10-CM

## 2022-10-12 PROCEDURE — 36415 COLL VENOUS BLD VENIPUNCTURE: CPT

## 2022-10-12 PROCEDURE — 93000 ELECTROCARDIOGRAM COMPLETE: CPT

## 2022-10-12 PROCEDURE — 99214 OFFICE O/P EST MOD 30 MIN: CPT | Mod: 25

## 2022-10-12 NOTE — DISCUSSION/SUMMARY
[EKG obtained to assist in diagnosis and management of assessed problem(s)] : EKG obtained to assist in diagnosis and management of assessed problem(s) [FreeTextEntry1] : Ms. Agrawal is a 53yo W with CVA (rt superior cerebellum/peduncle and left occipital stroke in Nov 2021), ILR, PFO s/p PFO closure 3/23/22 who is seen for a follow-up visit.\par \par #Chest pain:\par -s/p ILR removal\par -was seen in ER before, trop neg\par -as persistent, reasonable to rule out ischemia - stress echo ordered; CCTA also discussed but will start with echo; will also review CT chest she had in  July to see if any coronary calcium there\par -pt also concerned about PFO closure device - will check TTE as well\par -EKG today NSR\par \par #Palpitations:\par -check amb monitor\par -check TSH, CBC, BMP\par \par #CVA, headaches: is following with neuro \par -s/p PFO closure\par -Lp(a) normal\par -on aspirin, statin, LDL At goal\par \par #BP: controlled on ABPM and at home, no indication for pharm rx\par \par #ASCVD risk reduction: reports recently had A1c and lipid checked - will ask for records to be sent\par \par Follow-up 1 month or sooner as needed

## 2022-10-12 NOTE — CARDIOLOGY SUMMARY
[de-identified] : \par 10/12/22 EKG: NSR\par 6/16/22 EKG: sinus bradycardia\par 5/11/22 EKG: sinus bradycardia at 59bpm, otherwise nl  [de-identified] : \par ABPM 5/19-5/20/22: Avg /83 [de-identified] : \par 4/25/22 TTE: nl biv size and fxn, no sig valvular disease, no pericardial effusion, atrial septal occluder device with no color Doppler shunt and neg bubble study

## 2022-10-12 NOTE — HISTORY OF PRESENT ILLNESS
[FreeTextEntry1] : Ms. Agrawal is a 53yo W with CVA (rt superior cerebellum/peduncle and left occipital stroke in Nov 2021), ILR, PFO s/p PFO closure 3/23/22 who is seen for a follow-up visit. \par \par Initially seen in May 2022. At that time, ordered for ABPM due to variable BPs -- showed BPs are well controlled. \par Last seen in June 2022. At that time with episodes of CP thought related to ILR - subsequently had it removed. \par \par Since then:\par -things got better until a couple of weeks ago - then started having episodes of heart beating fast, then hot flashes\par -at times also with chest pinching in similar area as before; occurs randomly; no dyspnea but at times difficulty taking a deep breath\par -seeing Dr. 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; also still teaching dance\par \par CVA:\par -s/p PFO closure\par -has ILR\par -on aspirin, atorvastatin 20mg \par -Lp(a) normal\par \par Has been checking BPs at home, have been normal.\par \par March 2022 Labs\par Hb 12.8\par Hct 29.3\par Plts 252\par K 4.7\par Cr 0.97\par AST 16\par ALT 9\par Chol 114\par Trig 67\par HDL 49\par LDL 52\par TSH 2.27\par A1c 5.3%\par \par PMH/PSH:\par as above; also\par migraines\par tinnitus\par \par FH:\par no strokes\par MGM: quadruple bypass at age 60 (had DM, obesity, tob)\par mother: HTN\par father: HL\par \par SH:\par choreographer\par former tob\par no etoh\par \par ALL:\par sulfa\par \par ROS:\par no fever/chills\par no nausea/vomiting\par no chest pain/dyspnea\par occ palpitations described as being aware\par \par Lifestyle History:\par Mediterranean Diet Score (9 question survey) was 6\par (8-9: optimal, 6-7: near-optimal, 4-5: suboptimal, 0-3: markedly suboptimal)\par Exercise: Patient reports exercising at a moderate level for >150 minutes per week. \par Smoking: Former smoker, quit 28yrs ago, smoked ~4 cigs/day\par No etoh\par Stress: Patient denies any stress. \par No snoring\par \par No PCOS\par No pregnancies

## 2022-10-13 LAB
ANION GAP SERPL CALC-SCNC: 11 MMOL/L
BASOPHILS # BLD AUTO: 0.08 K/UL
BASOPHILS NFR BLD AUTO: 1.4 %
BUN SERPL-MCNC: 13 MG/DL
CALCIUM SERPL-MCNC: 9.4 MG/DL
CHLORIDE SERPL-SCNC: 102 MMOL/L
CO2 SERPL-SCNC: 28 MMOL/L
CREAT SERPL-MCNC: 0.95 MG/DL
EGFR: 72 ML/MIN/1.73M2
EOSINOPHIL # BLD AUTO: 0.12 K/UL
EOSINOPHIL NFR BLD AUTO: 2.1 %
GLUCOSE SERPL-MCNC: 91 MG/DL
HCT VFR BLD CALC: 42.6 %
HGB BLD-MCNC: 13.7 G/DL
IMM GRANULOCYTES NFR BLD AUTO: 0.2 %
LYMPHOCYTES # BLD AUTO: 1.58 K/UL
LYMPHOCYTES NFR BLD AUTO: 28 %
MAN DIFF?: NORMAL
MCHC RBC-ENTMCNC: 29.8 PG
MCHC RBC-ENTMCNC: 32.2 GM/DL
MCV RBC AUTO: 92.8 FL
MONOCYTES # BLD AUTO: 0.38 K/UL
MONOCYTES NFR BLD AUTO: 6.7 %
NEUTROPHILS # BLD AUTO: 3.47 K/UL
NEUTROPHILS NFR BLD AUTO: 61.6 %
PLATELET # BLD AUTO: 288 K/UL
POTASSIUM SERPL-SCNC: 4.8 MMOL/L
RBC # BLD: 4.59 M/UL
RBC # FLD: 13.5 %
SODIUM SERPL-SCNC: 141 MMOL/L
TSH SERPL-ACNC: 1.07 UIU/ML
WBC # FLD AUTO: 5.64 K/UL

## 2022-10-18 ENCOUNTER — OUTPATIENT (OUTPATIENT)
Dept: OUTPATIENT SERVICES | Facility: HOSPITAL | Age: 52
LOS: 1 days | End: 2022-10-18
Payer: MEDICAID

## 2022-10-18 ENCOUNTER — APPOINTMENT (OUTPATIENT)
Dept: SLEEP CENTER | Facility: HOSPITAL | Age: 52
End: 2022-10-18

## 2022-10-18 DIAGNOSIS — Q21.1 ATRIAL SEPTAL DEFECT: Chronic | ICD-10-CM

## 2022-10-18 DIAGNOSIS — G47.33 OBSTRUCTIVE SLEEP APNEA (ADULT) (PEDIATRIC): ICD-10-CM

## 2022-10-18 PROCEDURE — 95810 POLYSOM 6/> YRS 4/> PARAM: CPT | Mod: 26

## 2022-10-18 PROCEDURE — 95810 POLYSOM 6/> YRS 4/> PARAM: CPT

## 2022-10-20 ENCOUNTER — APPOINTMENT (OUTPATIENT)
Dept: NEUROLOGY | Facility: CLINIC | Age: 52
End: 2022-10-20

## 2022-10-20 VITALS
WEIGHT: 151 LBS | SYSTOLIC BLOOD PRESSURE: 123 MMHG | DIASTOLIC BLOOD PRESSURE: 87 MMHG | BODY MASS INDEX: 25.16 KG/M2 | HEIGHT: 65 IN | TEMPERATURE: 97.8 F | HEART RATE: 67 BPM | OXYGEN SATURATION: 100 %

## 2022-10-20 PROCEDURE — 99213 OFFICE O/P EST LOW 20 MIN: CPT

## 2022-10-20 NOTE — ASSESSMENT
[FreeTextEntry1] : The patient is a very pleasant 52 year-old female admitted 11/2021 with acute right superior cerebellum/peduncle and left occipital stroke secondary to PFO s/p closure 3/2021. She has had no recurrent stroke-like symptoms. She will continue ASA 81, statin therapy. Will recheck MRI in 3-4 months at the time of next f/u.

## 2022-10-20 NOTE — HISTORY OF PRESENT ILLNESS
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 Workup was unrevealing apart from PFO which she underwent closure 3/2021. She is here for follow-up. \par \par Since her last visit, the patient has had no recurrent symptoms concerning for stroke/TIA.  Most recent MRI Brain was stable (?new lesion in brainstem- thought to be likely wallerian degeneration- was stable) and MRI C spine was unrevealing for abnormal cord signal. Most recent ILR interrogation showed no AF but due to persistent pain, it was removed in 07/2022. She remains on statin, aspirin therapy, tolerating them well.  \par \par She has been having issues with palpitations, shortness of breath. She is seeing pulm and cardiology. She believes symptoms are possibly related to perimenopause. She is pending a SUNG eval and stress echo/TTE.

## 2022-11-07 ENCOUNTER — APPOINTMENT (OUTPATIENT)
Dept: PULMONOLOGY | Facility: CLINIC | Age: 52
End: 2022-11-07

## 2022-11-07 PROCEDURE — 99443: CPT

## 2022-11-07 NOTE — REVIEW OF SYSTEMS
[Cough] : no cough [Chest Tightness] : no chest tightness [Sputum] : no sputum [Pleuritic Pain] : no pleuritic pain [SOB on Exertion] : no sob on exertion [Edema] : no edema [Orthopnea] : no orthopnea [Palpitations] : palpitations [PND] : no PND [Negative] : Endocrine

## 2022-11-07 NOTE — CONSULT LETTER
[Dear  ___] : Dear  [unfilled], [Courtesy Letter:] : I had the pleasure of seeing your patient, [unfilled], in my office today. [Please see my note below.] : Please see my note below. [Consult Closing:] : Thank you very much for allowing me to participate in the care of this patient.  If you have any questions, please do not hesitate to contact me. [Sincerely,] : Sincerely, [FreeTextEntry3] : Pao Roth MD\par \par [employer] [facility]\par Pulmonary, Critical Care, and Sleep Medicine\par

## 2022-11-07 NOTE — HISTORY OF PRESENT ILLNESS
[Home] : at home, [unfilled] , at the time of the visit. [Medical Office: ([facility])___] : at the medical office located in  [Verbal consent obtained from patient] : the patient, [unfilled] [TextBox_4] : Had a stroke in 11/21 (cerebellar, left occipital, brainstem), found to have a PFO s/p closure in 5/22. During the summer, intermittently had sensation that she couldn't take a deep breath. Less common now, may be anxiety related. No symptoms at night. Works 3-6 miles a day without difficulty. Reports she is perimenopausal and symptoms started around the same time as hot flashes. No history of asthma/lung disease. Works as a dance choreographer. No environmental exposure, no pets/birds. She reports worse sleep overall since her stroke. She goes to bed at 2am, gets 8 hours, wakes up 2-3 times a night to use the bathroom. In evening can fall asleep while watching TV but not during the day. \par \par 11/7/2022\par Sleep has improved. PSG done. [ESS] : 5

## 2022-11-07 NOTE — ASSESSMENT
[FreeTextEntry1] : \par Reviewed:\par CTA chest 7/25/22: No PE, normal lung parenchyma\par PSG 10/2022: AHI 2.6 3%; AHI 0.7 4%: T88 0; PLMS w/o arousals\par \par Referred by Dr. Alarcon. PSG negative for SDB. No sleep related etiologies for her stroke history identified. PLMs w/o arousals of unclear clinical significance and she does not have any symptoms of periodic limb movement disorder. Follow up with me as needed.

## 2022-11-09 ENCOUNTER — APPOINTMENT (OUTPATIENT)
Dept: HEART AND VASCULAR | Facility: CLINIC | Age: 52
End: 2022-11-09

## 2022-11-09 PROCEDURE — 99212 OFFICE O/P EST SF 10 MIN: CPT | Mod: 25

## 2022-11-09 PROCEDURE — 93015 CV STRESS TEST SUPVJ I&R: CPT

## 2022-11-09 PROCEDURE — 93306 TTE W/DOPPLER COMPLETE: CPT

## 2022-11-10 NOTE — DISCUSSION/SUMMARY
[FreeTextEntry1] : Pt here for TTE and ETT - pls see ETT reports. \par Chest pain improved - no further episodes. \par ETT normal. \par TTE to be read formally. \par Follow-up in 3-6 months or sooner as needed.

## 2022-11-16 ENCOUNTER — APPOINTMENT (OUTPATIENT)
Dept: HEART AND VASCULAR | Facility: CLINIC | Age: 52
End: 2022-11-16

## 2022-11-30 NOTE — ED ADULT NURSE NOTE - HOW OFTEN DO YOU HAVE A DRINK CONTAINING ALCOHOL?
Subjective:       Patient ID: Diana Ambriz is a 44 y.o. female.    Chief Complaint:  Vaginal Bleeding    No LMP recorded. Patient has had an injection.  History of Present Illness  Complains of breakthrough bleeding with depo provera                                 OB History    Para Term  AB Living   0 0 0 0 0 0   SAB IAB Ectopic Multiple Live Births   0 0 0 0         Review of Systems  Review of Systems        Objective:    Physical Exam      Assessment:     1. Breakthrough bleeding on depo provera    2. Vaginal bleeding              Plan:   Diana was seen today for vaginal bleeding.    Diagnoses and all orders for this visit:    Breakthrough bleeding on depo provera  -     CBC Auto Differential; Future  -     TSH; Future  -     C. trachomatis/N. gonorrhoeae by AMP DNA Ochsalex; Urine  -     medroxyPROGESTERone (DEPO-PROVERA) injection 150 mg    Vaginal bleeding  -     POCT Urine Pregnancy    Reminded client that her pap smear is due, does not have time to do today      Never

## 2022-12-07 ENCOUNTER — APPOINTMENT (OUTPATIENT)
Dept: HEART AND VASCULAR | Facility: CLINIC | Age: 52
End: 2022-12-07

## 2023-01-10 ENCOUNTER — EMERGENCY (EMERGENCY)
Facility: HOSPITAL | Age: 53
LOS: 1 days | Discharge: HOME | End: 2023-01-10
Attending: EMERGENCY MEDICINE | Admitting: EMERGENCY MEDICINE
Payer: MEDICAID

## 2023-01-10 VITALS
DIASTOLIC BLOOD PRESSURE: 109 MMHG | OXYGEN SATURATION: 100 % | SYSTOLIC BLOOD PRESSURE: 141 MMHG | RESPIRATION RATE: 18 BRPM | TEMPERATURE: 98 F | WEIGHT: 155.43 LBS | HEART RATE: 84 BPM

## 2023-01-10 DIAGNOSIS — Z86.73 PERSONAL HISTORY OF TRANSIENT ISCHEMIC ATTACK (TIA), AND CEREBRAL INFARCTION WITHOUT RESIDUAL DEFICITS: ICD-10-CM

## 2023-01-10 DIAGNOSIS — Z20.822 CONTACT WITH AND (SUSPECTED) EXPOSURE TO COVID-19: ICD-10-CM

## 2023-01-10 DIAGNOSIS — R00.1 BRADYCARDIA, UNSPECIFIED: ICD-10-CM

## 2023-01-10 DIAGNOSIS — R51.9 HEADACHE, UNSPECIFIED: ICD-10-CM

## 2023-01-10 DIAGNOSIS — Z79.82 LONG TERM (CURRENT) USE OF ASPIRIN: ICD-10-CM

## 2023-01-10 DIAGNOSIS — E78.5 HYPERLIPIDEMIA, UNSPECIFIED: ICD-10-CM

## 2023-01-10 DIAGNOSIS — Z79.02 LONG TERM (CURRENT) USE OF ANTITHROMBOTICS/ANTIPLATELETS: ICD-10-CM

## 2023-01-10 DIAGNOSIS — Q21.1 ATRIAL SEPTAL DEFECT: Chronic | ICD-10-CM

## 2023-01-10 DIAGNOSIS — Z87.74 PERSONAL HISTORY OF (CORRECTED) CONGENITAL MALFORMATIONS OF HEART AND CIRCULATORY SYSTEM: ICD-10-CM

## 2023-01-10 DIAGNOSIS — Z88.2 ALLERGY STATUS TO SULFONAMIDES: ICD-10-CM

## 2023-01-10 LAB
ALBUMIN SERPL ELPH-MCNC: 4.7 G/DL — SIGNIFICANT CHANGE UP (ref 3.3–5)
ALP SERPL-CCNC: 56 U/L — SIGNIFICANT CHANGE UP (ref 40–120)
ALT FLD-CCNC: 13 U/L — SIGNIFICANT CHANGE UP (ref 10–45)
ANION GAP SERPL CALC-SCNC: 9 MMOL/L — SIGNIFICANT CHANGE UP (ref 5–17)
APTT BLD: 32.9 SEC — SIGNIFICANT CHANGE UP (ref 27.5–35.5)
AST SERPL-CCNC: 17 U/L — SIGNIFICANT CHANGE UP (ref 10–40)
BASOPHILS # BLD AUTO: 0.05 K/UL — SIGNIFICANT CHANGE UP (ref 0–0.2)
BASOPHILS NFR BLD AUTO: 0.7 % — SIGNIFICANT CHANGE UP (ref 0–2)
BILIRUB SERPL-MCNC: 0.3 MG/DL — SIGNIFICANT CHANGE UP (ref 0.2–1.2)
BUN SERPL-MCNC: 12 MG/DL — SIGNIFICANT CHANGE UP (ref 7–23)
CALCIUM SERPL-MCNC: 9.8 MG/DL — SIGNIFICANT CHANGE UP (ref 8.4–10.5)
CHLORIDE SERPL-SCNC: 100 MMOL/L — SIGNIFICANT CHANGE UP (ref 96–108)
CO2 SERPL-SCNC: 31 MMOL/L — SIGNIFICANT CHANGE UP (ref 22–31)
CREAT SERPL-MCNC: 0.87 MG/DL — SIGNIFICANT CHANGE UP (ref 0.5–1.3)
EGFR: 80 ML/MIN/1.73M2 — SIGNIFICANT CHANGE UP
EOSINOPHIL # BLD AUTO: 0.09 K/UL — SIGNIFICANT CHANGE UP (ref 0–0.5)
EOSINOPHIL NFR BLD AUTO: 1.3 % — SIGNIFICANT CHANGE UP (ref 0–6)
FLUAV AG NPH QL: SIGNIFICANT CHANGE UP
FLUBV AG NPH QL: SIGNIFICANT CHANGE UP
GLUCOSE SERPL-MCNC: 106 MG/DL — HIGH (ref 70–99)
HCT VFR BLD CALC: 43.7 % — SIGNIFICANT CHANGE UP (ref 34.5–45)
HGB BLD-MCNC: 14.4 G/DL — SIGNIFICANT CHANGE UP (ref 11.5–15.5)
IMM GRANULOCYTES NFR BLD AUTO: 0.3 % — SIGNIFICANT CHANGE UP (ref 0–0.9)
INR BLD: 1.05 — SIGNIFICANT CHANGE UP (ref 0.88–1.16)
LYMPHOCYTES # BLD AUTO: 1.55 K/UL — SIGNIFICANT CHANGE UP (ref 1–3.3)
LYMPHOCYTES # BLD AUTO: 22.9 % — SIGNIFICANT CHANGE UP (ref 13–44)
MCHC RBC-ENTMCNC: 29.6 PG — SIGNIFICANT CHANGE UP (ref 27–34)
MCHC RBC-ENTMCNC: 33 GM/DL — SIGNIFICANT CHANGE UP (ref 32–36)
MCV RBC AUTO: 89.9 FL — SIGNIFICANT CHANGE UP (ref 80–100)
MONOCYTES # BLD AUTO: 0.46 K/UL — SIGNIFICANT CHANGE UP (ref 0–0.9)
MONOCYTES NFR BLD AUTO: 6.8 % — SIGNIFICANT CHANGE UP (ref 2–14)
NEUTROPHILS # BLD AUTO: 4.6 K/UL — SIGNIFICANT CHANGE UP (ref 1.8–7.4)
NEUTROPHILS NFR BLD AUTO: 68 % — SIGNIFICANT CHANGE UP (ref 43–77)
NRBC # BLD: 0 /100 WBCS — SIGNIFICANT CHANGE UP (ref 0–0)
PLATELET # BLD AUTO: 279 K/UL — SIGNIFICANT CHANGE UP (ref 150–400)
POTASSIUM SERPL-MCNC: 3.6 MMOL/L — SIGNIFICANT CHANGE UP (ref 3.5–5.3)
POTASSIUM SERPL-SCNC: 3.6 MMOL/L — SIGNIFICANT CHANGE UP (ref 3.5–5.3)
PROT SERPL-MCNC: 7.8 G/DL — SIGNIFICANT CHANGE UP (ref 6–8.3)
PROTHROM AB SERPL-ACNC: 12.5 SEC — SIGNIFICANT CHANGE UP (ref 10.5–13.4)
RBC # BLD: 4.86 M/UL — SIGNIFICANT CHANGE UP (ref 3.8–5.2)
RBC # FLD: 12.6 % — SIGNIFICANT CHANGE UP (ref 10.3–14.5)
RSV RNA NPH QL NAA+NON-PROBE: SIGNIFICANT CHANGE UP
SARS-COV-2 RNA SPEC QL NAA+PROBE: SIGNIFICANT CHANGE UP
SODIUM SERPL-SCNC: 140 MMOL/L — SIGNIFICANT CHANGE UP (ref 135–145)
WBC # BLD: 6.77 K/UL — SIGNIFICANT CHANGE UP (ref 3.8–10.5)
WBC # FLD AUTO: 6.77 K/UL — SIGNIFICANT CHANGE UP (ref 3.8–10.5)

## 2023-01-10 PROCEDURE — 70450 CT HEAD/BRAIN W/O DYE: CPT | Mod: 26,59,MA

## 2023-01-10 PROCEDURE — 70496 CT ANGIOGRAPHY HEAD: CPT | Mod: 26,MA

## 2023-01-10 PROCEDURE — 99285 EMERGENCY DEPT VISIT HI MDM: CPT

## 2023-01-10 PROCEDURE — 70498 CT ANGIOGRAPHY NECK: CPT | Mod: 26,MA

## 2023-01-10 RX ORDER — ACETAMINOPHEN 500 MG
650 TABLET ORAL ONCE
Refills: 0 | Status: COMPLETED | OUTPATIENT
Start: 2023-01-10 | End: 2023-01-10

## 2023-01-10 RX ADMIN — Medication 650 MILLIGRAM(S): at 22:04

## 2023-01-10 NOTE — ED PROVIDER NOTE - NSFOLLOWUPINSTRUCTIONS_ED_ALL_ED_FT
You were seen in the Emergency Department today for throbbing sensation through your neck and headache.   Your work-up (labs, ekg, CT imaging) was reassuring to rule out an acute medical emergency.     Drink plenty of fluids, get plenty of rest.  Avoid strenuous activity until you are seen in follow up.  Take tylenol / motrin as needed for symptoms.     Follow up with Neurology in 2-3 weeks as previously scheduled.   Bring your results to your appointment to review the results.     Return to the Emergency Department for persistent, worsening, or new symptoms including headache, change in vision, focal weakness or numbness/tingling/paresthesias, difficulty speaking, difficulty walking, difficulty moving your arms or legs, any confusion, severe chest pain, shortness of breath, difficulty breathing, nausea/vomiting, excessive sweating, or any other serious concerns.

## 2023-01-10 NOTE — ED PROVIDER NOTE - PROGRESS NOTE DETAILS
labs unremarkable  ct head and cta imaging w/o acute findings.   pt feeling somewhat better after meds in ED. remains neuro intact. ok for dc.   has follow up w neurology in 2-3 weeks.     All results reviewed with the patient verbally. Discharge plan and return precautions d/w pt who verbalized understanding and agrees with plan. All questions answered. Vitals WNL. Ready for d/c.

## 2023-01-10 NOTE — ED PROVIDER NOTE - PATIENT PORTAL LINK FT
You can access the FollowMyHealth Patient Portal offered by [facility] by registering at the following website: [email or web address]. By joining [employer]’s FollowMyHealth portal, you will also be able to view your health information using other applications (apps) compatible with our system.

## 2023-01-10 NOTE — ED PROVIDER NOTE - PHYSICAL EXAMINATION
Constitutional : Well appearing, non-toxic, no acute distress. awake, alert, oriented to person, place, time/situation.  Head : head normocephalic, atraumatic  EENMT : eyes clear bilaterally, PERRL, EOMI. airway patent. moist mucous membranes. neck supple.  Cardiac : Normal rate, regular rhythm. No murmur appreciated, no LE edema.  Resp : Breath sounds clear and equal bilaterally. Respirations even and unlabored.   Gastro : abdomen soft, nontender, nondistended. no rebound or guarding. no CVAT.  MSK :  range of motion is not limited, no muscle or joint tenderness  Back : No evidence of trauma. No spinal or CVA tenderness.  Vasc : Extremities warm and well perfused. 2+ radial and DP pulses. cap refill <2 seconds  Neuro : Alert and oriented, CNII-XII grossly intact, no focal deficits, no motor or sensory deficits.  Skin : Skin normal color for race, warm, dry and intact. No evidence of rash.  Psych : Alert and oriented to person, place, time/situation. normal mood and affect. no apparent risk to self or others. Constitutional : Well appearing, non-toxic, no acute distress. awake, alert, oriented to person, place, time/situation.  Head : head normocephalic, atraumatic  EENMT : eyes clear bilaterally, PERRL, EOMI. airway patent. moist mucous membranes. neck supple.  Cardiac : Normal rate, regular rhythm. No murmur appreciated, no LE edema.  Resp : Breath sounds clear and equal bilaterally. Respirations even and unlabored.   Gastro : abdomen soft, nontender, nondistended. no rebound or guarding. no CVAT.  MSK :  range of motion is not limited, no muscle or joint tenderness  Back : No evidence of trauma. No spinal or CVA tenderness.  Vasc : Extremities warm and well perfused. 2+ radial and DP pulses. cap refill <2 seconds  Neuro : A&Ox3, CNII-XII grossly intact. visual fields intact, no nystagmus, normal speech and word-finding. 5/5 motor in UE and LE, sensation intact throughout. ambulating with a steady gait, normal tandem gait. normal finger to nose. negative Romberg, no pronator drift   Skin : Skin normal color for race, warm, dry and intact. No evidence of rash.  Psych : Alert and oriented to person, place, time/situation. normal mood and affect. no apparent risk to self or others.

## 2023-01-10 NOTE — ED ADULT TRIAGE NOTE - INTERNATIONAL TRAVEL
No

## 2023-01-10 NOTE — ED ADULT NURSE NOTE - CHPI ED NUR SEVERITY2
[address] [facility] [address]  [phone]               Thank you for choosing us for your health care visit with Willian Carvajal MD.  We are glad to serve you and happy to provide you with this summary of your visi acquired. Please contact the Patient Business Office at [phone] if you have any questions related to insurance coverage. Thank you.          Reason for Today's Visit     Leg Pain           Medical Issues Discussed Today     Atrial fibrillation ([facility] professional's instructions.              Allergies as of Apr 12, 2017     Aspirin Rash                Today's Vital Signs     BP Pulse Temp Weight          148/85 mmHg 94 97.2 °F (36.2 °C) (Oral) 168 lb (76.204 kg)           Current Medications          Th Support Staff. Remember, [employer] is NOT to be used for urgent needs. For medical emergencies, dial 911. Visit [email or web address] to learn more.            Visit [facility] online at  [email or web address]

## 2023-01-10 NOTE — ED ADULT NURSE NOTE - OBJECTIVE STATEMENT
Pt presents to the ED c/o intermittent, pulsating sensation to the head and the throat. Pt denies any pain, states the sensation is almost pressure-like, lasts for ~20min and then resolves. pt verbalized feeling anxious due to hx of stroke in 2021 and 'want to make sure everything is okay.' pt denies any other symptoms, denies any vision changes/lightheadedness/dizziness, no other deficits noted

## 2023-01-10 NOTE — ED PROVIDER NOTE - CLINICAL SUMMARY MEDICAL DECISION MAKING FREE TEXT BOX
suspect tension headache vs migraine  w symptoms and exam low suspicion for neurologic etiology but given history will get ct head / cta head and neck imaging history of hld, ischemic stroke (2021), PFO (s/p repair in 2022). here w throbbing sensation through bilateral neck and into head since last night. not similar to previous stroke sx.   pt well appearing, stable vitals, exam reassuring - neuro intact.  suspect tension headache vs migraine  w symptoms and normal neurolgic exam low suspicion for neurologic etiology but given history will get ct head / cta head and neck imaging  plan - labs, cth, cta imaging  headache meds  reassess

## 2023-01-10 NOTE — ED ADULT NURSE NOTE - SKIN INTEGRITY
ICD rep not needed for surgery per Dr. Meg Rodriguez
TRANSFER - OUT REPORT:    Verbal report given to [facility] RN(name) on Sadie Brown  being transferred to [facility](unit) for routine post - op       Report consisted of patients Situation, Background, Assessment and   Recommendations(SBAR). Information from the following report(s) SBAR, Kardex, OR Summary, Procedure Summary, Intake/Output and MAR was reviewed with the receiving nurse. Lines:   Peripheral IV 02/20/18 Right Wrist (Active)   Site Assessment Clean, dry, & intact 2/20/2018  4:03 PM   Phlebitis Assessment 0 2/20/2018  4:03 PM   Infiltration Assessment 0 2/20/2018  4:03 PM   Dressing Status Clean, dry, & intact 2/20/2018  4:03 PM   Dressing Type Transparent;Tape 2/20/2018  4:03 PM   Hub Color/Line Status Pink;Flushed;Capped 2/20/2018  4:03 PM       Peripheral IV 02/20/18 Left Arm (Active)   Site Assessment Clean, dry, & intact 2/20/2018  4:03 PM   Phlebitis Assessment 0 2/20/2018  4:03 PM   Infiltration Assessment 0 2/20/2018  4:03 PM   Dressing Status Clean, dry, & intact 2/20/2018  4:03 PM   Dressing Type Transparent;Tape 2/20/2018  4:03 PM   Hub Color/Line Status Green; Infusing 2/20/2018  4:03 PM        Opportunity for questions and clarification was provided. Patient transported with:   O2 @ 3 liters    VTE prophylaxis orders have been written for Lisanoah Nunez. Patient and family given floor number and nurses name. Family updated re: pt status after security code verified.
intact

## 2023-01-10 NOTE — ED ADULT NURSE NOTE - RESPIRATORY ASSESSMENT
"                                                                         [facility]                                                                       Query Response Note      PATIENT:               LESLEY ARROYO  ACCT #:                  [id number]  MRN:                     [id number]  :                      1943  ADMIT DATE:       2020 11:27 AM  DISCH DATE:          RESPONDING  PROVIDER #:        [id number]           QUERY TEXT:    \"Etiology of change in mental status is consistent with toxic metabolic encephalopathy vs seizure\" is documented in the Neurology Notes. Confusion is documented in all other notes. Please clarify status of this condition:    NOTE:  If an appropriate response is not listed below, please respond with a new note.    The patient's Clinical Indicators include:  5/10 Neurology Consult: Change in mental status. Remains encephalopathic. Examination and history suggest etiology may be more consistent with toxic metabolic encephalopathy vs.seizure.   EEG: A mild to moderate encephalopathy  is suggested. No seizures captured during the study.   : MD Note: Remains in bilateral wrist restraints.   MD Note: Increasing confusion-much clearer today  Treatment: Neurology consult; EEG; wrist restraints; treat underlying conditions; lab  Risk Factors: GIB; dementia; acute blood loss anemia/shock; DM with severe hyperglycemia  Options provided:   -- Toxic metabolic encephalopathy is ruled in   -- Toxic metabolic encephalopathy is ruled out   -- Unable to determine      Query created by: Kassy Winkler on 2020 1:52 PM    RESPONSE TEXT:    Toxic metabolic encephalopathy is ruled in          Electronically signed by:  RIMA DIAZ MD 2020 1:59 PM              "
- - -

## 2023-01-10 NOTE — ED PROVIDER NOTE - EKG #1 DATE/TIME
Assessment: 62y (1960) man with a PMHx significant for chronic advanced neuropathy, CAD s/p stent on DAPT 11/2022, DM, HTN, ESRD on dialysis M,W,F (last on 12/9) p/w b/l knee pain s/p fall 12/10, found to have subdural hematoma with no acute NSGY intervention. Neurology consulted for reported lethargy and inability to ambulate. Per wife, pt has been ambulating with a cane for several years due to diabetic neuropathy (uses a walker occasionally when he has gout flares).     Impression: acute on chronic worsening of generalized weakness (R>L), fluctuating MS likely multifactorial in setting of TME (new HD, anemia), cardiac arrhythmias, chronic severe peripheral neuropathy (likely 2/2 DM). LP to r/o AIDP, and other infectious/inflammatory/neoplastic etiologies given acute worsening. No cord compression on MRI    Recommendations:   [] check UA, TSH, T3/T4, vitamin B1, B6, B12, folate, homocysteine, methylmalonic acid, lactate, CK 71, ammonia 16, Cu, SPEP, HIV neg, ESR 72, , Zn, Vit D 25OH  [] f/u rEEG   [x] acute cord compression series neg  [] per PT, pt was able to ambulate with a walker earlier this admission (despite recent fall at home) and now is unable to ambulate. Given reported acute change, recommend LP. However pt on aspirin and plavix for recent cardiac stent 11/2022, and plavix would need to be held for 5-7 days prior to LP. Recommend discussion for cardiology for risk vs. benefit.  [] outpatient EMG/NCS   [] evaluation for gout and pain management per primary team   [] Patient can follow up with Dr. Sidney Baca after discharge. Please instruct the patient to call [phone] to schedule an appointment within the next 1-2 weeks. Office is located at [address], [place] [address].    Plans discussed with neurology attending, Dr. Ortiz Assessment: 62y (1960) man with a PMHx significant for chronic advanced neuropathy, CAD s/p stent on DAPT 11/2022, DM, HTN, ESRD on dialysis M,W,F (last on 12/9) p/w b/l knee pain s/p fall 12/10, found to have subdural hematoma with no acute NSGY intervention. Neurology consulted for reported lethargy and inability to ambulate. Per wife, pt has been ambulating with a cane for several years due to diabetic neuropathy (uses a walker occasionally when he has gout flares).     Impression: acute on chronic worsening of generalized weakness (R>L), fluctuating MS likely multifactorial in setting of TME (new HD, anemia), cardiac arrhythmias, chronic severe peripheral neuropathy (likely 2/2 DM). LP to r/o AIDP, and other infectious/inflammatory/neoplastic etiologies given acute worsening. No cord compression on MRI    Recommendations:   [] check UA, TSH, T3/T4, vitamin B1, B6, B12, folate, homocysteine, methylmalonic acid, lactate, CK 71, ammonia 16, Cu, SPEP, HIV neg, ESR 72, , Zn, Vit D 25OH  [] f/u rEEG   [x] acute cord compression series neg  [] per PT, pt was able to ambulate with a walker earlier this admission (despite recent fall at home) and now is unable to ambulate. Given reported acute change, recommend LP. However pt on aspirin and plavix for recent cardiac stent 11/2022, and plavix would need to be held for 5-7 days prior to LP. Cardiology consult appreciated, patient is high risk of stent thrombosis if plavix is held, would hold off LP for now due to high risk.  [] outpatient EMG/NCS   [] evaluation for gout and pain management per primary team   [] Patient can follow up with Dr. Sidney Baca after discharge. Please instruct the patient to call [phone] to schedule an appointment within the next 1-2 weeks. Office is located at [address] [place] [address].    Plans discussed with neurology attending, Dr. Ortiz Assessment: 62y (1960) man with a PMHx significant for chronic advanced neuropathy, CAD s/p stent on DAPT 11/2022, DM, HTN, ESRD on dialysis M,W,F (last on 12/9) p/w b/l knee pain s/p fall 12/10, found to have subdural hematoma with no acute NSGY intervention. Neurology consulted for reported lethargy and inability to ambulate. Per wife, pt has been ambulating with a cane for several years due to diabetic neuropathy (uses a walker occasionally when he has gout flares).     Impression: acute on chronic worsening of generalized weakness (R>L), fluctuating MS likely multifactorial in setting of TME (new HD, anemia), cardiac arrhythmias, chronic severe peripheral neuropathy (likely 2/2 DM). LP to r/o AIDP, and other infectious/inflammatory/neoplastic etiologies given acute worsening. No cord compression on MRI    Recommendations:   [] check UA, TSH, T3/T4, vitamin B1, B6, B12, folate, homocysteine, methylmalonic acid, lactate, CK 71, ammonia 16, Cu, SPEP, HIV neg, ESR 72, , Zn, Vit D 25OH  [] f/u rEEG   [x] acute cord compression series neg  [] per PT, pt was able to ambulate with a walker earlier this admission (despite recent fall at home) and now is unable to ambulate. Given reported acute change, recommend LP. However pt on aspirin and plavix for recent cardiac stent 11/2022, and plavix would need to be held for 5-7 days prior to LP. Cardiology consult appreciated, patient has high risk of stent thrombosis if plavix is held, would hold off LP for now due to high risk.  [] outpatient EMG/NCS   [] evaluation for gout and pain management per primary team   [] Patient can follow up with Dr. Sidney Baca after discharge. Please instruct the patient to call [phone] to schedule an appointment within the next 1-2 weeks. Office is located at [address] [place] [address].    Plans discussed with neurology attending, Dr. Ortiz 10-Kenan-2023 22:33

## 2023-01-10 NOTE — ED ADULT TRIAGE NOTE - CHIEF COMPLAINT QUOTE
"I had a stroke on 11/12/21 and since last night I keep getting a pulsating headache and dizziness."

## 2023-01-10 NOTE — ED PROVIDER NOTE - OBJECTIVE STATEMENT
52 yr old female, history of hld, ischemic stroke (2021), PFO (s/p repair in 2022), 52 yr old female, history of hld, ischemic stroke (2021), PFO (s/p repair in 2022),  last night started with throbbins sensation through bilateral sides of her neck  radiates up into head.  no vision changes, dizziness, extremity  does not feel like presentaiton for last stroke 52 yr old female, history of hld, ischemic stroke (2021), PFO (s/p repair in 2022), presents to the Emergency Department with headache. last night started with throbbing sensation through bilateral sides of her neck. radiates up into head. gradual onset. has fluctuated in intensity since it started.  no vision changes, dizziness, n/v, extremity weakness / numbness / tingling. no recent infectious symptoms.   does not feel symptoms she had during last stroke.

## 2023-01-11 VITALS
SYSTOLIC BLOOD PRESSURE: 138 MMHG | DIASTOLIC BLOOD PRESSURE: 91 MMHG | OXYGEN SATURATION: 99 % | HEART RATE: 58 BPM | RESPIRATION RATE: 18 BRPM

## 2023-01-11 PROCEDURE — 99285 EMERGENCY DEPT VISIT HI MDM: CPT | Mod: 25

## 2023-01-11 PROCEDURE — 85730 THROMBOPLASTIN TIME PARTIAL: CPT

## 2023-01-11 PROCEDURE — 87637 SARSCOV2&INF A&B&RSV AMP PRB: CPT

## 2023-01-11 PROCEDURE — 36415 COLL VENOUS BLD VENIPUNCTURE: CPT

## 2023-01-11 PROCEDURE — 85610 PROTHROMBIN TIME: CPT

## 2023-01-11 PROCEDURE — 85025 COMPLETE CBC W/AUTO DIFF WBC: CPT

## 2023-01-11 PROCEDURE — 93005 ELECTROCARDIOGRAM TRACING: CPT

## 2023-01-11 PROCEDURE — 70498 CT ANGIOGRAPHY NECK: CPT | Mod: MA

## 2023-01-11 PROCEDURE — 80053 COMPREHEN METABOLIC PANEL: CPT

## 2023-01-11 PROCEDURE — 96374 THER/PROPH/DIAG INJ IV PUSH: CPT | Mod: XU

## 2023-01-11 PROCEDURE — 70450 CT HEAD/BRAIN W/O DYE: CPT | Mod: MA

## 2023-01-11 PROCEDURE — 70496 CT ANGIOGRAPHY HEAD: CPT | Mod: MA

## 2023-01-11 RX ORDER — METOCLOPRAMIDE HCL 10 MG
10 TABLET ORAL ONCE
Refills: 0 | Status: COMPLETED | OUTPATIENT
Start: 2023-01-11 | End: 2023-01-11

## 2023-01-11 RX ADMIN — Medication 10 MILLIGRAM(S): at 02:24

## 2023-01-11 RX ADMIN — Medication 650 MILLIGRAM(S): at 02:34

## 2023-01-31 ENCOUNTER — APPOINTMENT (OUTPATIENT)
Dept: NEUROLOGY | Facility: CLINIC | Age: 53
End: 2023-01-31
Payer: MEDICAID

## 2023-01-31 VITALS
WEIGHT: 151 LBS | DIASTOLIC BLOOD PRESSURE: 84 MMHG | OXYGEN SATURATION: 97 % | TEMPERATURE: 96.38 F | BODY MASS INDEX: 25.16 KG/M2 | SYSTOLIC BLOOD PRESSURE: 127 MMHG | HEIGHT: 65 IN

## 2023-01-31 PROCEDURE — 99213 OFFICE O/P EST LOW 20 MIN: CPT

## 2023-01-31 NOTE — ASSESSMENT
[FreeTextEntry1] : The patient is a very pleasant 52 year-old female admitted 11/2021 with acute right superior cerebellum/peduncle and left occipital stroke secondary to PFO s/p closure 3/2021. She has had no recurrent stroke-like symptoms. She will continue ASA 81, statin therapy. Will recheck MR to ensure stability.  Will call with results. RTC 6 months, sooner if needed

## 2023-01-31 NOTE — HISTORY OF PRESENT ILLNESS
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 Workup was unrevealing apart from PFO which she underwent closure 3/2021. She is here for follow-up. \par \par Since her last visit, the patient has had no recurrent symptoms concerning for stroke/TIA. She remains on statin, aspirin therapy, tolerating them well. She has been having migraine aura without headache more recently but does still get an occasional classic migraine w aura. Nurtec previously worked well but due to lack of prior-auth, she hasn't been able to get it. She is hoping to move into a new apartment given anxiety related to stroke associated w current apartment.\par

## 2023-02-07 ENCOUNTER — OUTPATIENT (OUTPATIENT)
Dept: OUTPATIENT SERVICES | Facility: HOSPITAL | Age: 53
LOS: 1 days | End: 2023-02-07

## 2023-02-07 ENCOUNTER — APPOINTMENT (OUTPATIENT)
Dept: MRI IMAGING | Facility: CLINIC | Age: 53
End: 2023-02-07
Payer: MEDICAID

## 2023-02-07 ENCOUNTER — RESULT REVIEW (OUTPATIENT)
Age: 53
End: 2023-02-07

## 2023-02-07 DIAGNOSIS — Q21.1 ATRIAL SEPTAL DEFECT: Chronic | ICD-10-CM

## 2023-02-07 PROCEDURE — 70551 MRI BRAIN STEM W/O DYE: CPT | Mod: 26

## 2023-02-10 ENCOUNTER — NON-APPOINTMENT (OUTPATIENT)
Age: 53
End: 2023-02-10

## 2023-02-15 ENCOUNTER — APPOINTMENT (OUTPATIENT)
Dept: PHYSICAL MEDICINE AND REHAB | Facility: CLINIC | Age: 53
End: 2023-02-15
Payer: MEDICAID

## 2023-02-15 VITALS — BODY MASS INDEX: 25.16 KG/M2 | WEIGHT: 151 LBS | HEIGHT: 65 IN

## 2023-02-15 DIAGNOSIS — M67.912 UNSPECIFIED DISORDER OF SYNOVIUM AND TENDON, LEFT SHOULDER: ICD-10-CM

## 2023-02-15 DIAGNOSIS — M75.42 IMPINGEMENT SYNDROME OF LEFT SHOULDER: ICD-10-CM

## 2023-02-15 PROCEDURE — 99214 OFFICE O/P EST MOD 30 MIN: CPT

## 2023-02-24 ENCOUNTER — TRANSCRIPTION ENCOUNTER (OUTPATIENT)
Age: 53
End: 2023-02-24

## 2023-02-27 ENCOUNTER — TRANSCRIPTION ENCOUNTER (OUTPATIENT)
Age: 53
End: 2023-02-27

## 2023-02-27 ENCOUNTER — EMERGENCY (EMERGENCY)
Facility: HOSPITAL | Age: 53
LOS: 1 days | Discharge: HOME | End: 2023-02-27
Admitting: EMERGENCY MEDICINE
Payer: MEDICAID

## 2023-02-27 VITALS
OXYGEN SATURATION: 99 % | RESPIRATION RATE: 18 BRPM | SYSTOLIC BLOOD PRESSURE: 127 MMHG | HEART RATE: 67 BPM | DIASTOLIC BLOOD PRESSURE: 86 MMHG

## 2023-02-27 VITALS
SYSTOLIC BLOOD PRESSURE: 136 MMHG | OXYGEN SATURATION: 98 % | WEIGHT: 151.02 LBS | HEIGHT: 65 IN | DIASTOLIC BLOOD PRESSURE: 100 MMHG | TEMPERATURE: 98 F | HEART RATE: 103 BPM | RESPIRATION RATE: 16 BRPM

## 2023-02-27 DIAGNOSIS — Q21.10 ATRIAL SEPTAL DEFECT, UNSPECIFIED: ICD-10-CM

## 2023-02-27 DIAGNOSIS — Z20.822 CONTACT WITH AND (SUSPECTED) EXPOSURE TO COVID-19: ICD-10-CM

## 2023-02-27 DIAGNOSIS — Z86.73 PERSONAL HISTORY OF TRANSIENT ISCHEMIC ATTACK (TIA), AND CEREBRAL INFARCTION WITHOUT RESIDUAL DEFICITS: ICD-10-CM

## 2023-02-27 DIAGNOSIS — Z79.82 LONG TERM (CURRENT) USE OF ASPIRIN: ICD-10-CM

## 2023-02-27 DIAGNOSIS — R07.89 OTHER CHEST PAIN: ICD-10-CM

## 2023-02-27 DIAGNOSIS — Q21.1 ATRIAL SEPTAL DEFECT: Chronic | ICD-10-CM

## 2023-02-27 DIAGNOSIS — R00.2 PALPITATIONS: ICD-10-CM

## 2023-02-27 DIAGNOSIS — Z88.2 ALLERGY STATUS TO SULFONAMIDES: ICD-10-CM

## 2023-02-27 LAB
ALBUMIN SERPL ELPH-MCNC: 3.9 G/DL — SIGNIFICANT CHANGE UP (ref 3.4–5)
ALP SERPL-CCNC: 54 U/L — SIGNIFICANT CHANGE UP (ref 40–120)
ALT FLD-CCNC: 16 U/L — SIGNIFICANT CHANGE UP (ref 12–42)
ANION GAP SERPL CALC-SCNC: 4 MMOL/L — LOW (ref 9–16)
APTT BLD: 35.1 SEC — SIGNIFICANT CHANGE UP (ref 27.5–35.5)
AST SERPL-CCNC: 19 U/L — SIGNIFICANT CHANGE UP (ref 15–37)
BASOPHILS # BLD AUTO: 0.06 K/UL — SIGNIFICANT CHANGE UP (ref 0–0.2)
BASOPHILS NFR BLD AUTO: 1.1 % — SIGNIFICANT CHANGE UP (ref 0–2)
BILIRUB SERPL-MCNC: 0.6 MG/DL — SIGNIFICANT CHANGE UP (ref 0.2–1.2)
BUN SERPL-MCNC: 13 MG/DL — SIGNIFICANT CHANGE UP (ref 7–23)
CALCIUM SERPL-MCNC: 9.2 MG/DL — SIGNIFICANT CHANGE UP (ref 8.5–10.5)
CHLORIDE SERPL-SCNC: 103 MMOL/L — SIGNIFICANT CHANGE UP (ref 96–108)
CO2 SERPL-SCNC: 31 MMOL/L — SIGNIFICANT CHANGE UP (ref 22–31)
CREAT SERPL-MCNC: 0.89 MG/DL — SIGNIFICANT CHANGE UP (ref 0.5–1.3)
EGFR: 78 ML/MIN/1.73M2 — SIGNIFICANT CHANGE UP
EOSINOPHIL # BLD AUTO: 0.11 K/UL — SIGNIFICANT CHANGE UP (ref 0–0.5)
EOSINOPHIL NFR BLD AUTO: 2 % — SIGNIFICANT CHANGE UP (ref 0–6)
GLUCOSE SERPL-MCNC: 104 MG/DL — HIGH (ref 70–99)
HCT VFR BLD CALC: 40.6 % — SIGNIFICANT CHANGE UP (ref 34.5–45)
HGB BLD-MCNC: 13.3 G/DL — SIGNIFICANT CHANGE UP (ref 11.5–15.5)
IMM GRANULOCYTES NFR BLD AUTO: 0.5 % — SIGNIFICANT CHANGE UP (ref 0–0.9)
INR BLD: 1.14 — SIGNIFICANT CHANGE UP (ref 0.88–1.16)
LYMPHOCYTES # BLD AUTO: 1.56 K/UL — SIGNIFICANT CHANGE UP (ref 1–3.3)
LYMPHOCYTES # BLD AUTO: 28.1 % — SIGNIFICANT CHANGE UP (ref 13–44)
MAGNESIUM SERPL-MCNC: 2.1 MG/DL — SIGNIFICANT CHANGE UP (ref 1.6–2.6)
MCHC RBC-ENTMCNC: 29.7 PG — SIGNIFICANT CHANGE UP (ref 27–34)
MCHC RBC-ENTMCNC: 32.8 GM/DL — SIGNIFICANT CHANGE UP (ref 32–36)
MCV RBC AUTO: 90.6 FL — SIGNIFICANT CHANGE UP (ref 80–100)
MONOCYTES # BLD AUTO: 0.34 K/UL — SIGNIFICANT CHANGE UP (ref 0–0.9)
MONOCYTES NFR BLD AUTO: 6.1 % — SIGNIFICANT CHANGE UP (ref 2–14)
NEUTROPHILS # BLD AUTO: 3.45 K/UL — SIGNIFICANT CHANGE UP (ref 1.8–7.4)
NEUTROPHILS NFR BLD AUTO: 62.2 % — SIGNIFICANT CHANGE UP (ref 43–77)
NRBC # BLD: 0 /100 WBCS — SIGNIFICANT CHANGE UP (ref 0–0)
NT-PROBNP SERPL-SCNC: 42 PG/ML — SIGNIFICANT CHANGE UP
PLATELET # BLD AUTO: 260 K/UL — SIGNIFICANT CHANGE UP (ref 150–400)
POTASSIUM SERPL-MCNC: 3.9 MMOL/L — SIGNIFICANT CHANGE UP (ref 3.5–5.3)
POTASSIUM SERPL-SCNC: 3.9 MMOL/L — SIGNIFICANT CHANGE UP (ref 3.5–5.3)
PROT SERPL-MCNC: 7.2 G/DL — SIGNIFICANT CHANGE UP (ref 6.4–8.2)
PROTHROM AB SERPL-ACNC: 13.2 SEC — SIGNIFICANT CHANGE UP (ref 10.5–13.4)
RBC # BLD: 4.48 M/UL — SIGNIFICANT CHANGE UP (ref 3.8–5.2)
RBC # FLD: 12.7 % — SIGNIFICANT CHANGE UP (ref 10.3–14.5)
SARS-COV-2 RNA SPEC QL NAA+PROBE: SIGNIFICANT CHANGE UP
SODIUM SERPL-SCNC: 138 MMOL/L — SIGNIFICANT CHANGE UP (ref 132–145)
TROPONIN I, HIGH SENSITIVITY RESULT: <4 NG/L — SIGNIFICANT CHANGE UP
WBC # BLD: 5.55 K/UL — SIGNIFICANT CHANGE UP (ref 3.8–10.5)
WBC # FLD AUTO: 5.55 K/UL — SIGNIFICANT CHANGE UP (ref 3.8–10.5)

## 2023-02-27 PROCEDURE — 99285 EMERGENCY DEPT VISIT HI MDM: CPT

## 2023-02-27 PROCEDURE — 71046 X-RAY EXAM CHEST 2 VIEWS: CPT | Mod: 26

## 2023-02-27 RX ORDER — SODIUM CHLORIDE 9 MG/ML
3 INJECTION INTRAMUSCULAR; INTRAVENOUS; SUBCUTANEOUS EVERY 8 HOURS
Refills: 0 | Status: DISCONTINUED | OUTPATIENT
Start: 2023-02-27 | End: 2023-02-27

## 2023-02-27 NOTE — ED PROVIDER NOTE - OBJECTIVE STATEMENT
52-year-old female past medical history of CVA, PFO status post repair in March 2022 presents to the emergency department complaining of midsternal chest pain and pressure x2 weeks. She describes the pain as intermittent and fleeting.  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  She is followed by 2 cardiologists and reports a normal echo and stress test 5 months ago.  Patient admits to recent health related anxiety status post CVA.  She also endorses frequent hot flashes and palpitations due to recent menopause.  Her last menstrual cycle was approximately 8 months ago.

## 2023-02-27 NOTE — ED PROVIDER NOTE - CLINICAL SUMMARY MEDICAL DECISION MAKING FREE TEXT BOX
52-year-old female presents to the emergency department complaining of intermittent chest pain x2 weeks.  Patient well-appearing, vital signs stable, no acute distress.  Labs unremarkable.  EKG normal. 52-year-old female presents to the emergency department complaining of intermittent chest pain x2 weeks.  Patient well-appearing, vital signs stable, no acute distress.  Labs unremarkable.  EKG normal.  CXR negative.    Results and diagnosis discussed with patient.  Treatment plan discussed.  Return precautions discussed.  Pt verbalized understanding and given the opportunity to ask questions.  Patient advised to follow up with primary care provider.

## 2023-02-27 NOTE — ED ADULT TRIAGE NOTE - CHIEF COMPLAINT QUOTE
Pt states midsternal chest pain and pressure worsening since 2/14. Pt states radiating to left side and upper back since last night. Denies sob or dizziness. hx PFO repair about one yr ago.

## 2023-02-27 NOTE — ED PROVIDER NOTE - PATIENT PORTAL LINK FT
You can access the FollowMyHealth Patient Portal offered by [facility] by registering at the following website: [email or web address]. By joining [employer]’s FollowMyHealth portal, you will also be able to view your health information using other applications (apps) compatible with our system.

## 2023-02-27 NOTE — ED PROVIDER NOTE - NS ED ROS FT
Constitutional:  no fever, no chills  Eyes:  no discharge, no irritations, no pain, no redness, visual changes  Ears:  no ear pain, no ear drainage,  no hearing problems  Nose:  no nasal congestion, no nasal drainage  Mouth/Throat:  no hoarseness and no throat pain  Neck:  no stiffness, no pain, no lumps, no swollen glands  Cardiac:  + chest pain, no edema, + palpitations  Respiratory:  no cough, no shortness of breath  GI: no abdominal pain, no bloating, no constipation, no diarrhea, no nausea, no vomiting  MSK:  no back pain, no msk pain, no weakness  NEURO:  no headache, no weakness, no numbness  Skin:  no lesions, no pruritis, no jaundice, no bruising, no rash  Psych:  no known mental health issues  Endocrine:  no diabetes, no thyroid issues

## 2023-02-28 ENCOUNTER — TRANSCRIPTION ENCOUNTER (OUTPATIENT)
Age: 53
End: 2023-02-28

## 2023-03-22 ENCOUNTER — NON-APPOINTMENT (OUTPATIENT)
Age: 53
End: 2023-03-22

## 2023-03-22 ENCOUNTER — APPOINTMENT (OUTPATIENT)
Dept: HEART AND VASCULAR | Facility: CLINIC | Age: 53
End: 2023-03-22
Payer: MEDICAID

## 2023-03-22 VITALS
TEMPERATURE: 96.9 F | WEIGHT: 154.7 LBS | BODY MASS INDEX: 25.77 KG/M2 | OXYGEN SATURATION: 97 % | HEART RATE: 80 BPM | DIASTOLIC BLOOD PRESSURE: 87 MMHG | HEIGHT: 65 IN | SYSTOLIC BLOOD PRESSURE: 115 MMHG

## 2023-03-22 DIAGNOSIS — R07.89 OTHER CHEST PAIN: ICD-10-CM

## 2023-03-22 PROCEDURE — 99214 OFFICE O/P EST MOD 30 MIN: CPT | Mod: 25

## 2023-03-22 PROCEDURE — 93000 ELECTROCARDIOGRAM COMPLETE: CPT

## 2023-03-22 NOTE — DISCUSSION/SUMMARY
[FreeTextEntry1] : Jesica Agrawal is a 51yo W with CVA (rt superior cerebellum/peduncle and left occipital stroke in Nov 2021), ILR s/p removal, PFO s/p PFO closure 3/23/22 who is seen for a follow-up visit. \par \par #Chest pain:\par -s/p ILR removal\par -still with episodes, including chest pressure like symptoms that prompted ER visit\par -ETT wnl\par -given persistent CP despite nl ETT, will pursue CCTA to r/o obs CAD, recent Cr wnl 0.89 on 2/27/23\par -EKG today NSR\par -reports recent breast imaging with mammo wnl\par -recommend GI and pulm eval given persistent symptoms\par -also possible msk given pt with left shoulder impingement \par \par #CVA, headaches: is following with neuro \par -s/p PFO closure, f/u with Dr. Adair next month as planned\par -s/p ILR removal\par -Lp(a) normal\par -continue asa, statin\par -pending review of lab results for lipid, A1c - if unable to obtain will have her recheck here\par \par #BP: controlled on ABPM and at home, no indication for pharm rx\par \par Follow-up after testing [EKG obtained to assist in diagnosis and management of assessed problem(s)] : EKG obtained to assist in diagnosis and management of assessed problem(s)

## 2023-03-22 NOTE — HISTORY OF PRESENT ILLNESS
[FreeTextEntry1] : Jesica Agrawal is a 53yo W with CVA (rt superior cerebellum/peduncle and left occipital stroke in Nov 2021), ILR s/p removal, PFO s/p PFO closure 3/23/22 who is seen for a follow-up visit. \par \par Last seen in Oct/Nov 2022 for CP, had TTE and ETT. In the past ILR removed as thought may be causing CP. \par \par Since then:\par -went to ER in Feb 2023 for CP, labs neg - reports intermittent episodes of sharp CP that then progressed to chest pressure\par -since that visit doing ok until this weekend, felt a little pinching\par -still physically active, dancing\par \par CVA:\par -s/p PFO closure; due for Dr. Adair visit in April \par -ILR removed\par -on aspirin, atorvastatin 20mg \par -Lp(a) normal\par \par March 2022 Labs\par Hb 12.8\par Hct 29.3\par Plts 252\par K 4.7\par Cr 0.97\par AST 16\par ALT 9\par Chol 114\par Trig 67\par HDL 49\par LDL 52\par TSH 2.27\par A1c 5.3%\par \par PMH/PSH:\par as above; also\par migraines\par tinnitus\par \par FH:\par no strokes\par MGM: quadruple bypass at age 60 (had DM, obesity, tob)\par mother: HTN\par father: HL\par \par SH:\par choreographer\par former tob\par no etoh\par \par ALL:\par sulfa\par \par ROS:\par no fever/chills\par no nausea/vomiting\par no chest pain/dyspnea\par occ palpitations described as being aware\par \par Lifestyle History:\par Mediterranean Diet Score (9 question survey) was 6\par (8-9: optimal, 6-7: near-optimal, 4-5: suboptimal, 0-3: markedly suboptimal)\par Exercise: Patient reports exercising at a moderate level for >150 minutes per week. \par Smoking: Former smoker, quit 28yrs ago, smoked ~4 cigs/day\par No etoh\par Stress: Patient denies any stress. \par No snoring\par \par No PCOS\par No pregnancies

## 2023-03-22 NOTE — CARDIOLOGY SUMMARY
[de-identified] : \par 10/12/22 EKG: NSR\par 6/16/22 EKG: sinus bradycardia\par 5/11/22 EKG: sinus bradycardia at 59bpm, otherwise nl  [de-identified] : \par ABPM 5/19-5/20/22: Avg /83 [de-identified] : \par 11/9/22 ETT: normal 10.3 METs [de-identified] : \par 4/25/22 TTE: nl biv size and fxn, no sig valvular disease, no pericardial effusion, atrial septal occluder device with no color Doppler shunt and neg bubble study \par 11/9/22 TTE: normal LV function EF 60-65%, septal device noted, no sig valvular disease, no pericardial effusion

## 2023-04-05 ENCOUNTER — OUTPATIENT (OUTPATIENT)
Dept: OUTPATIENT SERVICES | Facility: HOSPITAL | Age: 53
LOS: 1 days | End: 2023-04-05

## 2023-04-05 ENCOUNTER — APPOINTMENT (OUTPATIENT)
Dept: CT IMAGING | Facility: CLINIC | Age: 53
End: 2023-04-05
Payer: MEDICAID

## 2023-04-05 DIAGNOSIS — Q21.1 ATRIAL SEPTAL DEFECT: Chronic | ICD-10-CM

## 2023-04-05 PROCEDURE — 75574 CT ANGIO HRT W/3D IMAGE: CPT | Mod: 26

## 2023-04-06 ENCOUNTER — RX RENEWAL (OUTPATIENT)
Age: 53
End: 2023-04-06

## 2023-04-23 ENCOUNTER — FORM ENCOUNTER (OUTPATIENT)
Age: 53
End: 2023-04-23

## 2023-04-24 ENCOUNTER — APPOINTMENT (OUTPATIENT)
Dept: CARDIOTHORACIC SURGERY | Facility: CLINIC | Age: 53
End: 2023-04-24
Payer: MEDICAID

## 2023-04-24 ENCOUNTER — OUTPATIENT (OUTPATIENT)
Dept: OUTPATIENT SERVICES | Facility: HOSPITAL | Age: 53
LOS: 1 days | End: 2023-04-24
Payer: MEDICAID

## 2023-04-24 VITALS
HEART RATE: 60 BPM | SYSTOLIC BLOOD PRESSURE: 132 MMHG | DIASTOLIC BLOOD PRESSURE: 94 MMHG | RESPIRATION RATE: 16 BRPM | OXYGEN SATURATION: 100 %

## 2023-04-24 DIAGNOSIS — Q21.1 ATRIAL SEPTAL DEFECT: Chronic | ICD-10-CM

## 2023-04-24 DIAGNOSIS — Q21.19 OTHER SPECIFIED ATRIAL SEPTAL DEFECT: ICD-10-CM

## 2023-04-24 PROCEDURE — 93306 TTE W/DOPPLER COMPLETE: CPT

## 2023-04-24 PROCEDURE — 99214 OFFICE O/P EST MOD 30 MIN: CPT

## 2023-04-24 PROCEDURE — 93306 TTE W/DOPPLER COMPLETE: CPT | Mod: 26

## 2023-04-24 NOTE — HISTORY OF PRESENT ILLNESS
[FreeTextEntry1] : 52 year old former smoking female who was recently admitted for Right superior cerebellum/peduncle and left occipital stroke on 11/2021, followed by loop recorder placement, patent foramen ovale w s/p PFO closure on 03/23/2022 who presents for one year follow up. \par \par Since her last visit, the patient states she is feeling well.  She had her loop recorder removed.  She does complain of some pinching in her chest at times.  She denies chest pain, shortness of breath at rest or with exertion, dizziness, syncope, orthopnea, PND, or LE edema.  She denies palpitations and any stroke-like symptoms.

## 2023-05-08 ENCOUNTER — TRANSCRIPTION ENCOUNTER (OUTPATIENT)
Age: 53
End: 2023-05-08

## 2023-05-08 ENCOUNTER — RX RENEWAL (OUTPATIENT)
Age: 53
End: 2023-05-08

## 2023-05-22 ENCOUNTER — APPOINTMENT (OUTPATIENT)
Dept: ORTHOPEDIC SURGERY | Facility: CLINIC | Age: 53
End: 2023-05-22
Payer: MEDICAID

## 2023-05-22 VITALS — BODY MASS INDEX: 25.33 KG/M2 | HEIGHT: 65 IN | WEIGHT: 152 LBS

## 2023-05-22 DIAGNOSIS — M75.100 UNSPECIFIED ROTATOR CUFF TEAR OR RUPTURE OF UNSPECIFIED SHOULDER, NOT SPECIFIED AS TRAUMATIC: ICD-10-CM

## 2023-05-22 PROCEDURE — 73030 X-RAY EXAM OF SHOULDER: CPT | Mod: LT

## 2023-05-22 PROCEDURE — 99203 OFFICE O/P NEW LOW 30 MIN: CPT

## 2023-06-07 NOTE — ED PROVIDER NOTE - DISPOSITION TYPE
Detail Level: Detailed
Add 97710 Cpt? (Important Note: In 2017 The Use Of 87479 Is Being Tracked By Cms To Determine Future Global Period Reimbursement For Global Periods): yes
ADMIT

## 2023-07-13 ENCOUNTER — RX RENEWAL (OUTPATIENT)
Age: 53
End: 2023-07-13

## 2023-07-20 ENCOUNTER — TRANSCRIPTION ENCOUNTER (OUTPATIENT)
Age: 53
End: 2023-07-20

## 2023-07-21 ENCOUNTER — TRANSCRIPTION ENCOUNTER (OUTPATIENT)
Age: 53
End: 2023-07-21

## 2023-07-29 ENCOUNTER — NON-APPOINTMENT (OUTPATIENT)
Age: 53
End: 2023-07-29

## 2023-08-01 ENCOUNTER — APPOINTMENT (OUTPATIENT)
Dept: NEUROLOGY | Facility: CLINIC | Age: 53
End: 2023-08-01
Payer: MEDICAID

## 2023-08-01 DIAGNOSIS — I63.9 CEREBRAL INFARCTION, UNSPECIFIED: ICD-10-CM

## 2023-08-01 PROCEDURE — 99212 OFFICE O/P EST SF 10 MIN: CPT

## 2023-08-01 NOTE — HISTORY OF PRESENT ILLNESS
[FreeTextEntry1] : The patient is a very pleasant 53 year-old female admitted 11/2021 with acute right superior cerebellum/peduncle and left occipital stroke. Workup was unrevealing apart from PFO which she underwent closure 3/2021. She is here for follow-up.  Since her last visit, the patient has had no recurrent symptoms concerning for stroke/TIA. She remains on statin, aspirin therapy, tolerating them well. Migraines are improved overall. She is very happy to report her mood/PTSD has improved substantially with time. She still would like to get our of her apartment for peace of mind.

## 2023-08-01 NOTE — PHYSICAL EXAM
[FreeTextEntry1] : Alert. Fully oriented. Speech/language intact. CN grossly intact. Moving all limbs symmetrically. Gait is normal.

## 2023-08-01 NOTE — ASSESSMENT
[FreeTextEntry1] : The patient is a very pleasant 53 year-old female admitted 11/2021 with acute right superior cerebellum/peduncle and left occipital stroke secondary to PFO s/p closure 3/2021. She has had no recurrent stroke-like symptoms. She will continue ASA 81, statin therapy. RTC 6 months, sooner if needed.

## 2023-10-03 ENCOUNTER — APPOINTMENT (OUTPATIENT)
Dept: HEART AND VASCULAR | Facility: CLINIC | Age: 53
End: 2023-10-03
Payer: MEDICAID

## 2023-10-03 VITALS
SYSTOLIC BLOOD PRESSURE: 117 MMHG | OXYGEN SATURATION: 97 % | WEIGHT: 152 LBS | DIASTOLIC BLOOD PRESSURE: 87 MMHG | HEIGHT: 65 IN | BODY MASS INDEX: 25.33 KG/M2 | HEART RATE: 68 BPM | TEMPERATURE: 97 F

## 2023-10-03 DIAGNOSIS — I10 ESSENTIAL (PRIMARY) HYPERTENSION: ICD-10-CM

## 2023-10-03 DIAGNOSIS — I73.9 PERIPHERAL VASCULAR DISEASE, UNSPECIFIED: ICD-10-CM

## 2023-10-03 PROCEDURE — 99205 OFFICE O/P NEW HI 60 MIN: CPT | Mod: 25

## 2023-10-03 PROCEDURE — 93000 ELECTROCARDIOGRAM COMPLETE: CPT

## 2023-10-03 PROCEDURE — 36415 COLL VENOUS BLD VENIPUNCTURE: CPT

## 2023-10-04 ENCOUNTER — APPOINTMENT (OUTPATIENT)
Dept: HEART AND VASCULAR | Facility: CLINIC | Age: 53
End: 2023-10-04

## 2023-10-04 LAB
ALBUMIN SERPL ELPH-MCNC: 4.7 G/DL
ALP BLD-CCNC: 59 U/L
ALT SERPL-CCNC: 18 U/L
ANION GAP SERPL CALC-SCNC: 9 MMOL/L
APPEARANCE: CLEAR
AST SERPL-CCNC: 22 U/L
BACTERIA: NEGATIVE /HPF
BILIRUB SERPL-MCNC: 0.6 MG/DL
BILIRUBIN URINE: NEGATIVE
BLOOD URINE: NEGATIVE
BUN SERPL-MCNC: 18 MG/DL
CALCIUM SERPL-MCNC: 9.5 MG/DL
CAST: 1 /LPF
CHLORIDE SERPL-SCNC: 104 MMOL/L
CHOLEST SERPL-MCNC: 158 MG/DL
CO2 SERPL-SCNC: 27 MMOL/L
COLOR: YELLOW
CREAT SERPL-MCNC: 0.85 MG/DL
CREAT SPEC-SCNC: 185 MG/DL
EGFR: 82 ML/MIN/1.73M2
EPITHELIAL CELLS: 15 /HPF
GLUCOSE QUALITATIVE U: NEGATIVE MG/DL
GLUCOSE SERPL-MCNC: 97 MG/DL
HDLC SERPL-MCNC: 77 MG/DL
KETONES URINE: NEGATIVE MG/DL
LDLC SERPL CALC-MCNC: 71 MG/DL
LEUKOCYTE ESTERASE URINE: NEGATIVE
MAGNESIUM SERPL-MCNC: 2 MG/DL
MICROALBUMIN 24H UR DL<=1MG/L-MCNC: <1.2 MG/DL
MICROALBUMIN/CREAT 24H UR-RTO: NORMAL MG/G
MICROSCOPIC-UA: NORMAL
NITRITE URINE: NEGATIVE
NONHDLC SERPL-MCNC: 82 MG/DL
PH URINE: 5.5
POTASSIUM SERPL-SCNC: 4.8 MMOL/L
PROT SERPL-MCNC: 6.7 G/DL
PROTEIN URINE: NEGATIVE MG/DL
RED BLOOD CELLS URINE: 2 /HPF
SODIUM SERPL-SCNC: 140 MMOL/L
SPECIFIC GRAVITY URINE: 1.02
TRIGL SERPL-MCNC: 51 MG/DL
TSH SERPL-ACNC: 1.53 UIU/ML
UROBILINOGEN URINE: 0.2 MG/DL
WHITE BLOOD CELLS URINE: 0 /HPF

## 2023-10-06 ENCOUNTER — APPOINTMENT (OUTPATIENT)
Dept: HEART AND VASCULAR | Facility: CLINIC | Age: 53
End: 2023-10-06
Payer: MEDICAID

## 2023-10-06 PROCEDURE — 36415 COLL VENOUS BLD VENIPUNCTURE: CPT

## 2023-10-07 LAB
BASOPHILS # BLD AUTO: 0.08 K/UL
BASOPHILS NFR BLD AUTO: 1.1 %
EOSINOPHIL # BLD AUTO: 0.13 K/UL
EOSINOPHIL NFR BLD AUTO: 1.8 %
ESTIMATED AVERAGE GLUCOSE: 108 MG/DL
HBA1C MFR BLD HPLC: 5.4 %
HCT VFR BLD CALC: 44.1 %
HGB BLD-MCNC: 14 G/DL
IMM GRANULOCYTES NFR BLD AUTO: 0.1 %
LYMPHOCYTES # BLD AUTO: 1.79 K/UL
LYMPHOCYTES NFR BLD AUTO: 24.9 %
MAN DIFF?: NORMAL
MCHC RBC-ENTMCNC: 29.7 PG
MCHC RBC-ENTMCNC: 31.7 GM/DL
MCV RBC AUTO: 93.6 FL
MONOCYTES # BLD AUTO: 0.5 K/UL
MONOCYTES NFR BLD AUTO: 6.9 %
NEUTROPHILS # BLD AUTO: 4.69 K/UL
NEUTROPHILS NFR BLD AUTO: 65.2 %
PLATELET # BLD AUTO: 297 K/UL
RBC # BLD: 4.71 M/UL
RBC # FLD: 13.1 %
WBC # FLD AUTO: 7.2 K/UL

## 2023-10-09 ENCOUNTER — TRANSCRIPTION ENCOUNTER (OUTPATIENT)
Age: 53
End: 2023-10-09

## 2023-10-10 ENCOUNTER — TRANSCRIPTION ENCOUNTER (OUTPATIENT)
Age: 53
End: 2023-10-10

## 2023-10-11 ENCOUNTER — APPOINTMENT (OUTPATIENT)
Dept: ORTHOPEDIC SURGERY | Facility: CLINIC | Age: 53
End: 2023-10-11
Payer: MEDICAID

## 2023-10-11 VITALS
HEIGHT: 65 IN | WEIGHT: 152 LBS | DIASTOLIC BLOOD PRESSURE: 89 MMHG | SYSTOLIC BLOOD PRESSURE: 120 MMHG | BODY MASS INDEX: 25.33 KG/M2 | HEART RATE: 78 BPM | OXYGEN SATURATION: 96 %

## 2023-10-11 DIAGNOSIS — M54.50 LOW BACK PAIN, UNSPECIFIED: ICD-10-CM

## 2023-10-11 DIAGNOSIS — R20.2 PARESTHESIA OF SKIN: ICD-10-CM

## 2023-10-11 PROCEDURE — 99214 OFFICE O/P EST MOD 30 MIN: CPT | Mod: 25

## 2023-10-11 PROCEDURE — 72083 X-RAY EXAM ENTIRE SPI 4/5 VW: CPT

## 2023-10-24 ENCOUNTER — APPOINTMENT (OUTPATIENT)
Dept: HEART AND VASCULAR | Facility: CLINIC | Age: 53
End: 2023-10-24
Payer: MEDICAID

## 2023-10-24 VITALS
BODY MASS INDEX: 25.33 KG/M2 | HEART RATE: 61 BPM | DIASTOLIC BLOOD PRESSURE: 96 MMHG | OXYGEN SATURATION: 96 % | SYSTOLIC BLOOD PRESSURE: 134 MMHG | HEIGHT: 65 IN | WEIGHT: 152 LBS

## 2023-10-24 PROCEDURE — 99214 OFFICE O/P EST MOD 30 MIN: CPT

## 2023-10-31 ENCOUNTER — APPOINTMENT (OUTPATIENT)
Dept: ORTHOPEDIC SURGERY | Facility: CLINIC | Age: 53
End: 2023-10-31

## 2023-10-31 DIAGNOSIS — M75.122 COMPLETE ROTATOR CUFF TEAR OR RUPTURE OF LEFT SHOULDER, NOT SPECIFIED AS TRAUMATIC: ICD-10-CM

## 2023-12-04 ENCOUNTER — APPOINTMENT (OUTPATIENT)
Dept: HEART AND VASCULAR | Facility: CLINIC | Age: 53
End: 2023-12-04
Payer: MEDICAID

## 2023-12-04 VITALS
SYSTOLIC BLOOD PRESSURE: 111 MMHG | BODY MASS INDEX: 25.33 KG/M2 | DIASTOLIC BLOOD PRESSURE: 80 MMHG | HEIGHT: 65 IN | WEIGHT: 152 LBS | HEART RATE: 70 BPM

## 2023-12-04 PROCEDURE — 99214 OFFICE O/P EST MOD 30 MIN: CPT

## 2023-12-11 ENCOUNTER — APPOINTMENT (OUTPATIENT)
Dept: NEUROLOGY | Facility: CLINIC | Age: 53
End: 2023-12-11

## 2024-01-03 ENCOUNTER — APPOINTMENT (OUTPATIENT)
Dept: RADIOLOGY | Facility: HOSPITAL | Age: 54
End: 2024-01-03
Payer: MEDICAID

## 2024-01-03 ENCOUNTER — OUTPATIENT (OUTPATIENT)
Dept: OUTPATIENT SERVICES | Facility: HOSPITAL | Age: 54
LOS: 1 days | End: 2024-01-03
Payer: MEDICAID

## 2024-01-03 DIAGNOSIS — Q21.1 ATRIAL SEPTAL DEFECT: Chronic | ICD-10-CM

## 2024-01-03 PROCEDURE — 74240 X-RAY XM UPR GI TRC 1CNTRST: CPT

## 2024-01-03 PROCEDURE — 74240 X-RAY XM UPR GI TRC 1CNTRST: CPT | Mod: 26

## 2024-01-17 ENCOUNTER — APPOINTMENT (OUTPATIENT)
Dept: ORTHOPEDIC SURGERY | Facility: CLINIC | Age: 54
End: 2024-01-17
Payer: MEDICAID

## 2024-01-17 DIAGNOSIS — M54.50 LOW BACK PAIN, UNSPECIFIED: ICD-10-CM

## 2024-01-17 DIAGNOSIS — G89.29 LOW BACK PAIN, UNSPECIFIED: ICD-10-CM

## 2024-01-17 PROCEDURE — 99214 OFFICE O/P EST MOD 30 MIN: CPT

## 2024-01-17 NOTE — DISCUSSION/SUMMARY
[de-identified] : 1/17/2024 I discussed my findings with the patient. - There is no indication for surgical intervention at this time. Conservative treatment measures were discussed. - She was given an order for physical therapy for a neutral spine protocol. - She was also given contact information for Dr. Lea of physiatry to follow up with for an evaluation if no improvement after PT. - She will follow up with me on an as-needed basis all questions answered.

## 2024-01-17 NOTE — PHYSICAL EXAM
[de-identified] : Physical Exam: Lumbar Spine  General: patient is well developed, well nourished, in no acute  distress, alert and oriented x 3.     Mood and affect: normal     Respiratory: no respiratory distress noted     Skin: no scars over spine, skin intact, no erythema, increased warmth     Alignment: The spine is well compensated in the coronal and sagittal plane.     Gait: The patient is able to toe walk and heel walk without difficulty. The patient is able to tandem gait without difficulty.     Palpation: no tenderness to palpation spine or paraspinal region     Range of motion: Lumbar spine ROM is full     Neurologic Exam:  Motor: Manual Muscle testing in the lower extremities is 5 out of 5 in all muscle groups. There is no evidence of muscular  atrophy in the lower extremities. Sensory: Sensation to light touch is grossly intact in the lower extremities     Reflexes: DTR are present and symmetric throughout, no clonus, plantar responses are flexor     Hip Exam: No pain with internal or external rotation of hips bilaterally     Special tests: Straight leg raise test negative. Cross straight leg test negative. CALLI test negative     Vascular: Examination of the peripheral vascular system demonstrates no evidence of congestion or edema. no evidence of  lymphedema bilateral lower extremities, pulses are present and symmetric in both lower extremities. [de-identified] : MRI Lumbar Spine without Contrast 11/30/2023 [my read]: shows L4/L5  disc bulge and facet arthropathy, yet no significant central canal or foraminal stenosis.  10/11/2023: X-ray, AP, lateral, full spine with lumbar flexion extension views 10- (my read): Shows vertebral bodies into space heights well preserved. No spondylolisthesis or dynamic instability. No scoliosis no acute fracture seen

## 2024-01-17 NOTE — HISTORY OF PRESENT ILLNESS
[de-identified] : 1/17/2024 52 y/o F presenting for followup of back pain. She reports back pain that is worse with forward flexion. Pain is intermittent. No pain with extension of her low back. No pain radiating to her lower extremities. No lower extremity weakness. No new neurologic symptoms since her last visit.  10/11/2023: 53y F reports burning sensation in the bottoms of both feet on set approximately three weeks ago. Patient reports mild improvement of symptoms after she changed her shoes. She also reports around that time a new onset non radiating low back pain. She feels like a pulling sensation and is worse with a forward flexion. She denies any pain radiating down her lower extremities, she denies any lower extremity weakness. Patient works as a choreographer and as a dancer and she reports she was able to perform the duties of her job without any limitation. She has taken Tylenol for pain.

## 2024-01-17 NOTE — END OF VISIT
[FreeTextEntry3] : All medical record entries made by the Scribe were at my, Dr. Mykel Franco, direction and personally dictated by me on 01/17/2024. I have reviewed the chart and agree that the record accurately reflects my personal performance of the history, physical exam, assessment and plan. I have also personally directed, reviewed, and agreed with the chart.

## 2024-01-18 ENCOUNTER — TRANSCRIPTION ENCOUNTER (OUTPATIENT)
Age: 54
End: 2024-01-18

## 2024-01-19 ENCOUNTER — TRANSCRIPTION ENCOUNTER (OUTPATIENT)
Age: 54
End: 2024-01-19

## 2024-01-23 ENCOUNTER — TRANSCRIPTION ENCOUNTER (OUTPATIENT)
Age: 54
End: 2024-01-23

## 2024-01-26 PROBLEM — M54.50 CHRONIC BILATERAL LOW BACK PAIN WITHOUT SCIATICA: Status: ACTIVE | Noted: 2023-10-30

## 2024-02-01 ENCOUNTER — EMERGENCY (EMERGENCY)
Facility: HOSPITAL | Age: 54
LOS: 1 days | Discharge: HOME | End: 2024-02-01
Attending: EMERGENCY MEDICINE | Admitting: EMERGENCY MEDICINE
Payer: MEDICAID

## 2024-02-01 VITALS
HEART RATE: 87 BPM | RESPIRATION RATE: 16 BRPM | DIASTOLIC BLOOD PRESSURE: 89 MMHG | OXYGEN SATURATION: 98 % | HEIGHT: 65 IN | SYSTOLIC BLOOD PRESSURE: 123 MMHG | WEIGHT: 154.98 LBS | TEMPERATURE: 99 F

## 2024-02-01 VITALS
DIASTOLIC BLOOD PRESSURE: 81 MMHG | TEMPERATURE: 98 F | RESPIRATION RATE: 16 BRPM | HEART RATE: 55 BPM | OXYGEN SATURATION: 100 % | SYSTOLIC BLOOD PRESSURE: 119 MMHG

## 2024-02-01 DIAGNOSIS — Z88.2 ALLERGY STATUS TO SULFONAMIDES: ICD-10-CM

## 2024-02-01 DIAGNOSIS — Q21.1 ATRIAL SEPTAL DEFECT: Chronic | ICD-10-CM

## 2024-02-01 DIAGNOSIS — R00.2 PALPITATIONS: ICD-10-CM

## 2024-02-01 DIAGNOSIS — R42 DIZZINESS AND GIDDINESS: ICD-10-CM

## 2024-02-01 LAB
ALBUMIN SERPL ELPH-MCNC: 3.4 G/DL — SIGNIFICANT CHANGE UP (ref 3.4–5)
ALP SERPL-CCNC: 48 U/L — SIGNIFICANT CHANGE UP (ref 40–120)
ALT FLD-CCNC: 20 U/L — SIGNIFICANT CHANGE UP (ref 12–42)
ANION GAP SERPL CALC-SCNC: 6 MMOL/L — LOW (ref 9–16)
AST SERPL-CCNC: 28 U/L — SIGNIFICANT CHANGE UP (ref 15–37)
BASOPHILS # BLD AUTO: 0.08 K/UL — SIGNIFICANT CHANGE UP (ref 0–0.2)
BASOPHILS NFR BLD AUTO: 1.1 % — SIGNIFICANT CHANGE UP (ref 0–2)
BILIRUB SERPL-MCNC: 0.6 MG/DL — SIGNIFICANT CHANGE UP (ref 0.2–1.2)
BUN SERPL-MCNC: 14 MG/DL — SIGNIFICANT CHANGE UP (ref 7–23)
CALCIUM SERPL-MCNC: 8.8 MG/DL — SIGNIFICANT CHANGE UP (ref 8.5–10.5)
CHLORIDE SERPL-SCNC: 103 MMOL/L — SIGNIFICANT CHANGE UP (ref 96–108)
CO2 SERPL-SCNC: 26 MMOL/L — SIGNIFICANT CHANGE UP (ref 22–31)
CREAT SERPL-MCNC: 0.89 MG/DL — SIGNIFICANT CHANGE UP (ref 0.5–1.3)
D DIMER BLD IA.RAPID-MCNC: <187 NG/ML DDU — SIGNIFICANT CHANGE UP
EGFR: 77 ML/MIN/1.73M2 — SIGNIFICANT CHANGE UP
EOSINOPHIL # BLD AUTO: 0.13 K/UL — SIGNIFICANT CHANGE UP (ref 0–0.5)
EOSINOPHIL NFR BLD AUTO: 1.8 % — SIGNIFICANT CHANGE UP (ref 0–6)
GLUCOSE SERPL-MCNC: 86 MG/DL — SIGNIFICANT CHANGE UP (ref 70–99)
HCG SERPL-ACNC: <1 MIU/ML — SIGNIFICANT CHANGE UP
HCT VFR BLD CALC: 41.5 % — SIGNIFICANT CHANGE UP (ref 34.5–45)
HGB BLD-MCNC: 13.6 G/DL — SIGNIFICANT CHANGE UP (ref 11.5–15.5)
IMM GRANULOCYTES NFR BLD AUTO: 0.3 % — SIGNIFICANT CHANGE UP (ref 0–0.9)
LYMPHOCYTES # BLD AUTO: 2.05 K/UL — SIGNIFICANT CHANGE UP (ref 1–3.3)
LYMPHOCYTES # BLD AUTO: 28.8 % — SIGNIFICANT CHANGE UP (ref 13–44)
MCHC RBC-ENTMCNC: 29.6 PG — SIGNIFICANT CHANGE UP (ref 27–34)
MCHC RBC-ENTMCNC: 32.8 GM/DL — SIGNIFICANT CHANGE UP (ref 32–36)
MCV RBC AUTO: 90.2 FL — SIGNIFICANT CHANGE UP (ref 80–100)
MONOCYTES # BLD AUTO: 0.45 K/UL — SIGNIFICANT CHANGE UP (ref 0–0.9)
MONOCYTES NFR BLD AUTO: 6.3 % — SIGNIFICANT CHANGE UP (ref 2–14)
NEUTROPHILS # BLD AUTO: 4.4 K/UL — SIGNIFICANT CHANGE UP (ref 1.8–7.4)
NEUTROPHILS NFR BLD AUTO: 61.7 % — SIGNIFICANT CHANGE UP (ref 43–77)
NRBC # BLD: 0 /100 WBCS — SIGNIFICANT CHANGE UP (ref 0–0)
NT-PROBNP SERPL-SCNC: 23 PG/ML — SIGNIFICANT CHANGE UP
PLATELET # BLD AUTO: 297 K/UL — SIGNIFICANT CHANGE UP (ref 150–400)
POTASSIUM SERPL-MCNC: 4.3 MMOL/L — SIGNIFICANT CHANGE UP (ref 3.5–5.3)
POTASSIUM SERPL-SCNC: 4.3 MMOL/L — SIGNIFICANT CHANGE UP (ref 3.5–5.3)
PROT SERPL-MCNC: 6.9 G/DL — SIGNIFICANT CHANGE UP (ref 6.4–8.2)
RBC # BLD: 4.6 M/UL — SIGNIFICANT CHANGE UP (ref 3.8–5.2)
RBC # FLD: 13.2 % — SIGNIFICANT CHANGE UP (ref 10.3–14.5)
SODIUM SERPL-SCNC: 135 MMOL/L — SIGNIFICANT CHANGE UP (ref 132–145)
TROPONIN I, HIGH SENSITIVITY RESULT: <4 NG/L — SIGNIFICANT CHANGE UP
TSH SERPL-MCNC: 1.47 UIU/ML — SIGNIFICANT CHANGE UP (ref 0.36–3.74)
WBC # BLD: 7.13 K/UL — SIGNIFICANT CHANGE UP (ref 3.8–10.5)
WBC # FLD AUTO: 7.13 K/UL — SIGNIFICANT CHANGE UP (ref 3.8–10.5)

## 2024-02-01 PROCEDURE — 99285 EMERGENCY DEPT VISIT HI MDM: CPT

## 2024-02-01 PROCEDURE — 71046 X-RAY EXAM CHEST 2 VIEWS: CPT | Mod: 26

## 2024-02-01 RX ORDER — SODIUM CHLORIDE 9 MG/ML
1000 INJECTION INTRAMUSCULAR; INTRAVENOUS; SUBCUTANEOUS ONCE
Refills: 0 | Status: COMPLETED | OUTPATIENT
Start: 2024-02-01 | End: 2024-02-01

## 2024-02-01 RX ADMIN — SODIUM CHLORIDE 1000 MILLILITER(S): 9 INJECTION INTRAMUSCULAR; INTRAVENOUS; SUBCUTANEOUS at 16:59

## 2024-02-01 NOTE — ED PROVIDER NOTE - PATIENT PORTAL LINK FT
You can access the FollowMyHealth Patient Portal offered by [facility] by registering at the following website: [email or web address]. By joining [facility]’s FollowMyHealth portal, you will also be able to view your health information using other applications (apps) compatible with our system.

## 2024-02-01 NOTE — ED PROVIDER NOTE - PROGRESS NOTE DETAILS
pt w normal work up including EKG, lytes, trop dimer and cxr. Pt feeling much better. Will dc w cardio follow up, pt has private cardio she f/u with. encouraged to return to er if worsening symptoms

## 2024-02-01 NOTE — ED PROVIDER NOTE - OBJECTIVE STATEMENT
HPI - This is a female patient with prior history of an ischemic stroke with no residual deficits secondary to a PFO that has been since closed.  Patient is also perimenopausal.  Patient has been experiencing palpitation sensation that she describes as a sensation of her heart flipping in her chest that started last week and she noticed that when she walks or exerts herself.  In the last several days has been happening more frequently so patient decided to come to the emergency room.  [place] slightly lightheaded at times but this has since resolved.  Patient denies any headache neck pain chest pain or shortness of breath.  No sensation of irregular heartbeats, no sensation of rapid heartbeat.  Her medications include atorvastatin, amlodipine for hypertension, baby aspirin daily and vitamin D.  Patient denies any fever chills abdominal pain nausea vomiting diarrhea constipation focal weakness or any other symptoms at this time.  Patient has good follow-up with cardiology and is due to see them for follow-up next month.    EKG is a normal sinus rhythm with a heart rate of 69, no ST-T changes.    On examination patient is alert and oriented and able to provide all history, no distress.  Normal heart sounds, normal lung sounds, abdomen nontender.  Neuroexam normal.  Skin normal.

## 2024-02-01 NOTE — ED ADULT NURSE NOTE - OBJECTIVE STATEMENT
Patient presents with complaint of palpitation described as a heart "flipping' sensation since last week and has been happening more frequently. H/o ischemic stroke without deficits and PFO which is closed now. Denies SOB, CP, dizziness, nausea, vomiting, fever, chills.

## 2024-02-01 NOTE — ED PROVIDER NOTE - CLINICAL SUMMARY MEDICAL DECISION MAKING FREE TEXT BOX
HPI - This is a female patient with prior history of an ischemic stroke with no residual deficits secondary to a PFO that has been since closed.  Patient is also perimenopausal.  Patient has been experiencing palpitation sensation that she describes as a sensation of her heart flipping in her chest that started last week and she noticed that when she walks or exerts herself.  In the last several days has been happening more frequently so patient decided to come to the emergency room.  [place] slightly lightheaded at times but this has since resolved.  Patient denies any headache neck pain chest pain or shortness of breath.  No sensation of irregular heartbeats, no sensation of rapid heartbeat.  Her medications include atorvastatin, amlodipine for hypertension, baby aspirin daily and vitamin D.  Patient denies any fever chills abdominal pain nausea vomiting diarrhea constipation focal weakness or any other symptoms at this time.  Patient has good follow-up with cardiology and is due to see them for follow-up next month.    EKG is a normal sinus rhythm with a heart rate of 69, no ST-T changes.    On examination patient is alert and oriented and able to provide all history, no distress.  Normal heart sounds, normal lung sounds, abdomen nontender.  Neuroexam normal.  Skin normal.    MDM will workup for palpitation sensation.  EKG initially nonischemic, no arrhythmias.  Will check electrolytes, TSH, troponin and dimer and a chest x-ray.  Will otherwise offer supportive treatment.  If workup unremarkable patient has good relationship with her cardiologist and she can follow-up as an outpatient.

## 2024-02-21 NOTE — ED PROVIDER NOTE - BIRTH SEX
HEART CARE NOTE        Thank you, Dr. SLADE, for asking the [facility] team to see Floyd Capps to evaluate new onset HFrEF.      Assessment/Recommendations     1. Severe HFrEF c/b ADHF  Assessment / Plan  New onset; will need ischemia eval; will plan for coronary angiogram +/- PCI when renal function stable to improved; I discussed this with him including potential risk of stroke, myocardial infarction, or death.  We also discussed the possibility of vascular injury, bleeding requiring blood transfusion, or reaction to x-ray dye although he tolerated x-ray dye.  Hypervolemic on physical exam - diuresing well on current regimen - no changes at this time; continue to monitor UOP and renal function closely  Patient is high risk for adverse cardiac events 2/2  severe systolic dysfunction, elevated NTproBNP, non-compliance  GDMT as detailed below    Current Pharmacotherapy AHA Guideline-Directed Medical Therapy   Losartan 25 mg - on hold given SCHUYLER Lisinopril 20 mg twice daily   Carvedilol 12.5 mg twice daily Carvedilol 25 mg twice daily   Spironolactone not started Spironolactone 25 mg once daily   Hydralazine NA Hydralazine 100 mg three times daily   Isosorbide dinitrate NA Isosorbide dinitrate 40 mg three times daily   SGLT2 inhibitor:Dapagliflozin/Empagliflozin - not started Dapagliflozin or Empagliflozin 10 mg daily     2. SCHUYLER in CKD  Assessment / Plan  Likely CRS in the setting of ADHF - diuresis as above  Continue to monitor UOP and renal function closely    3. DM2  Assessment / Plan  C/b DM foot ulcerf  Management per primary team  Currently on ISS    4. Tobacco abuse  Assessment / Plan  Counseled regarding cessation    5. HTN  Assessment / Plan  Adequately controlled on current regimen - no changes at this time    Clinically Significant Risk Factors           # Hypercalcemia: corrected calcium is >10.1, will monitor as appropriate  # Hypomagnesemia: Lowest Mg = 1.6 mg/dL in last 2 days, will  "replace as needed   # Hypoalbuminemia: Lowest albumin = 2 g/dL at 2/20/2024  5:46 AM, will monitor as appropriate    # Acute Kidney Injury, unspecified: based on a >150% or 0.3 mg/dL increase in last creatinine compared to past 90 day average, will monitor renal function   # Acute heart failure with reduced ejection fraction: last echo with EF <40% and receiving IV diuretics      # DMII: A1C = 11.8 % (Ref range: <5.7 %) within past 6 months   # Obesity: Estimated body mass index is 38.94 kg/m  as calculated from the following:    Height as of this encounter: 1.778 m (5' 10\").    Weight as of this encounter: 123.1 kg (271 lb 6.2 oz).      # Financial/Environmental Concerns: none        Cardiomyopathy  Systolic acute    Fluid overload, unspecified, Other fluid overload, and Other disorders of electrolyte and fluid balance, not elsewhere classified    Acute kidney failure, unspecified  CKD POA List: Stage 3a (GFR 45-59)      85 minutes spent reviewing prior records (including documentation, laboratory studies, cardiac testing/imaging), history and physical exam, planning, and subsequent documentation.      History of Present Illness/Subjective    Mr. Floyd Capps is a 50 year old male with a PMHx significant for (per Epic notation) DM, HTN, CVA, obesity, recent ED visit for nonhealing diabetic foot ulcer with cellulitis, here for worsening pain in the foot, with necrotic tissue noted on exam now found to be in new onset HFrEF    Today,  reports LE edema and dyspnea for possibly years, but presented at this time 2/2 DM foot ulcer; Management plan as detailed above    ECG: Personally reviewed. normal sinus rhythm, nonspecific ST and T waves changes.    ECHO (personnaly Reviewed on 2/21/24):   The left ventricle is normal in size. There is mild concentric left  ventricular hypertrophy.  Left ventricular function is decreased. The ejection fraction is 30-35%  (moderately reduced). There is global hypokinesis with " "severe hypokinesis of  the mid to apical inferior wall.  Diastolic Doppler findings (E/E' ratio and/or other parameters) suggest left  ventricular filling pressures are increased.     The right ventricle is normal in size and function.  Normal left atrial size. Right atrial size is normal.  There is mild (1+) mitral regurgitation.  IVC diameter >2.1 cm collapsing <50% with sniff suggests a high RA pressure  estimated at 15 mmHg or greater.     Compared to previous study from 8/23/2023 the LV systolic function has  declined and there are regional wall motion abnormalities.    Telemetry: personally reviewed February 21, 2024; notable for sinus rhythm     Lab results: personally reviewed February 21, 2024; notable for SCHUYLER on CKD    Medical history and pertinent documents reviewed in Care Everywhere please where applicable see details above          Physical Examination Review of Systems   /74 (BP Location: Left arm)   Pulse 77   Temp 97.6  F (36.4  C) (Oral)   Resp 20   Ht 1.778 m (5' 10\")   Wt 123.1 kg (271 lb 6.2 oz)   SpO2 95%   BMI 38.94 kg/m    Body mass index is 38.94 kg/m .  Wt Readings from Last 3 Encounters:   02/14/24 123.1 kg (271 lb 6.2 oz)   02/13/24 122.5 kg (270 lb)   02/06/24 122.5 kg (270 lb)     General Appearance:   no distress, normal body habitus   ENT/Mouth: membranes moist, no oral lesions or bleeding gums.      EYES:  no scleral icterus, normal conjunctivae   Neck: no carotid bruits or thyromegaly   Chest/Lungs:   lungs are clear to auscultation, no rales or wheezing, equal chest wall expansion    Cardiovascular:   Regular. Normal first and second heart sounds with no murmurs, rubs, or gallops; the carotid, radial and posterior tibial pulses are intact, + JVD and LE edema bilaterally    Abdomen:  no organomegaly, masses, bruits, or tenderness; bowel sounds are present   Extremities: no cyanosis or clubbing   Skin: no xanthelasma, warm.    Neurologic: NAD     Psychiatric: alert and " oriented x3, calm     A complete 10 systems ROS was reviewed  And is negative except what is listed in the HPI.          Medical History  Surgical History Family History Social History   Past Medical History:   Diagnosis Date    Diabetes (H)     Past Surgical History:   Procedure Laterality Date    APPENDECTOMY      INCISION AND DRAINAGE OF WOUND      groin abscess    IRRIGATION AND DEBRIDEMENT FOOT, COMBINED Right 2/16/2024    Procedure: IRRIGATION AND DEBRIDEMENT RIGHT FOOT;  Surgeon: Cristofer Sims DPM;  Location: [facility] Main OR    no family history of premature coronary artery disease Social History     Socioeconomic History    Marital status: Single     Spouse name: Not on file    Number of children: Not on file    Years of education: Not on file    Highest education level: Not on file   Occupational History    Not on file   Tobacco Use    Smoking status: Every Day     Packs/day: .5     Types: Cigarettes    Smokeless tobacco: Never    Tobacco comments:     Seen IP by [facility] on 8/24/23 and declined cessation services and materials.    Vaping Use    Vaping Use: Never used   Substance and Sexual Activity    Alcohol use: No    Drug use: No    Sexual activity: Not on file   Other Topics Concern    Not on file   Social History Narrative    Patient reports that he does not have health insurance.     Social Determinants of Health     Financial Resource Strain: Low Risk  (10/20/2023)    Financial Resource Strain     Within the past 12 months, have you or your family members you live with been unable to get utilities (heat, electricity) when it was really needed?: No   Food Insecurity: High Risk (10/20/2023)    Food Insecurity     Within the past 12 months, did you worry that your food would run out before you got money to buy more?: Yes     Within the past 12 months, did the food you bought just not last and you didn t have money to get more?: No   Transportation Needs: Low Risk  (10/20/2023)    Transportation Needs  "    Within the past 12 months, has lack of transportation kept you from medical appointments, getting your medicines, non-medical meetings or appointments, work, or from getting things that you need?: No   Physical Activity: Not on file   Stress: Not on file   Social Connections: Not on file   Interpersonal Safety: Low Risk  (10/20/2023)    Interpersonal Safety     Do you feel physically and emotionally safe where you currently live?: Yes     Within the past 12 months, have you been hit, slapped, kicked or otherwise physically hurt by someone?: No     Within the past 12 months, have you been humiliated or emotionally abused in other ways by your partner or ex-partner?: No   Housing Stability: High Risk (10/20/2023)    Housing Stability     Do you have housing? : Yes     Are you worried about losing your housing?: Yes           Lab Results    Chemistry/lipid CBC Cardiac Enzymes/BNP/TSH/INR   Lab Results   Component Value Date    CHOL 330 (H) 08/23/2023    HDL 55 08/23/2023    TRIG 246 (H) 08/23/2023    BUN 11.2 02/21/2024     02/21/2024    CO2 31 (H) 02/21/2024    Lab Results   Component Value Date    WBC 12.0 (H) 02/20/2024    HGB 10.3 (L) 02/20/2024    HCT 32.2 (L) 02/20/2024    MCV 92 02/20/2024     02/20/2024     02/20/2024    Lab Results   Component Value Date    TSH 2.72 08/23/2023    INR 0.95 08/23/2023     No results found for: \"CKTOTAL\", \"CKMB\", \"TROPONINI\"       Weight:    Wt Readings from Last 3 Encounters:   02/14/24 123.1 kg (271 lb 6.2 oz)   02/13/24 122.5 kg (270 lb)   02/06/24 122.5 kg (270 lb)       Allergies  No Known Allergies      Surgical History  Past Surgical History:   Procedure Laterality Date    APPENDECTOMY      INCISION AND DRAINAGE OF WOUND      groin abscess    IRRIGATION AND DEBRIDEMENT FOOT, COMBINED Right 2/16/2024    Procedure: IRRIGATION AND DEBRIDEMENT RIGHT FOOT;  Surgeon: Cristofer Sims DPM;  [facility]       Social History  Tobacco: "   History   Smoking Status    Every Day    Packs/day: 0.50    Types: Cigarettes   Smokeless Tobacco    Never    Alcohol:   Social History    Substance and Sexual Activity      Alcohol use: No   Illicit Drugs:   History   Drug Use No       Family History  History reviewed. No pertinent family history.       Shayna Arndt MD on 2/21/2024      cc: Glendy Benavides,      Female

## 2024-03-05 ENCOUNTER — APPOINTMENT (OUTPATIENT)
Dept: HEART AND VASCULAR | Facility: CLINIC | Age: 54
End: 2024-03-05
Payer: MEDICAID

## 2024-03-05 VITALS
BODY MASS INDEX: 25.83 KG/M2 | OXYGEN SATURATION: 95 % | HEIGHT: 65 IN | WEIGHT: 155 LBS | HEART RATE: 71 BPM | DIASTOLIC BLOOD PRESSURE: 70 MMHG | SYSTOLIC BLOOD PRESSURE: 103 MMHG | TEMPERATURE: 98 F

## 2024-03-05 PROCEDURE — 99214 OFFICE O/P EST MOD 30 MIN: CPT | Mod: 25

## 2024-03-05 PROCEDURE — 93000 ELECTROCARDIOGRAM COMPLETE: CPT

## 2024-03-05 PROCEDURE — 36415 COLL VENOUS BLD VENIPUNCTURE: CPT

## 2024-03-05 NOTE — DISCUSSION/SUMMARY
[Patient] : the patient [___ Month(s)] : in [unfilled] month(s) [FreeTextEntry1] : 54 y/o female with h/o cva 2021, migraine, pfo repair, pad, htn who presents for f/up today.  -holter ordered for palpitations -echo ordered for palpitations -advised she f/up with EP for palpitations -labs 2023 reviewed, labs ordered today -ABPM 5/19-5/20/22: Avg /83 -CTA cor 4/23: normal, no cad -ECHO 4/24/2023: The left ventricle is normal in size, wall thickness, and systolic function with a calculated ejection fraction of 60-65%. Injection of agitated saline via a peripheral vein reveals no evidence of a right-to-left shunt. Atrial septal occluder appears well seated with no significant interatrial shunt by color flow Doppler. The aortic valve is tricuspid with normal structure and function without stenosis. The peak transvalvular velocity is 1.27 m/s, the mean transvalvular gradient is 4.00 mmHg, and the LVOT/AV velocity ratio is 0.75. The peak transaortic gradient is 6.45 mmHg. There is no evidence of aortic regurgitation. Structurally normal mitral valve with normal leaflet excursion. There is mild mitral regurgitation. Structurally normal tricuspid valve with normal leaflet excursion. There is trace tricuspid regurgitation. No pericardial effusion is seen. -LITO 2021: ef 65%, no clot, interatrial septal aneurysm, pfo, trace mr/tr/pr, mild nonmobile plaque desc ao, mild nonmobile plaque ao arch -11/9/22 ETT: normal 10.3 METs -sleep study 2022: negative -normal LpA -continue norvasc -continue asa, statin -given cva and h/o aortic arch plaque -neuro f/up for migraine, cva -heme recs re: hypercoag w/up - normal except for ddimer elevated - she will f/up wtih them -ekg ordered today - nsr, normal intervals, no st/t changes -s/p ILR removal - saw EP -counseled on cvd rsik factors -f/up 6 month for palpitations  I have spent 30 minutes reviewing labs, records, tests and discussed cvd risk factors, pfo repair, palpitations.    [EKG obtained to assist in diagnosis and management of assessed problem(s)] : EKG obtained to assist in diagnosis and management of assessed problem(s)

## 2024-03-05 NOTE — PHYSICAL EXAM
[Normal] : well developed, well nourished, no acute distress [Well Developed] : well developed [No Acute Distress] : no acute distress [Well Nourished] : well nourished

## 2024-03-05 NOTE — HISTORY OF PRESENT ILLNESS
[FreeTextEntry1] : 52 y/o female with h/o cva 2021, migraine, pfo repair, pad, htn who presents for f/up today.   last seen 12/23   had ILR in for 6 months - no af/at saw Georgie in past   seen by [facility] for hypercoag w/up - normal except for ddimer  former patient Dr. Hugo  notes increasing palpitations for past 2 months - once/twice week  no cp, sob, lh, syncope, edema, orthopnea, pnd    admitted 11/2021 with acute right superior cerebellum/peduncle and left occipital stroke. Workup was unrevealing apart from PFO which she underwent closure 3/2022.  followed by neuro followed by Dr. Adair  seen by EP last 2022 had ILR removed 2022   elevated bp but not on meds walks 6 miles/day   -CTA cor 4/23: normal, no cad -ECHO 4/24/2023: The left ventricle is normal in size, wall thickness, and systolic function with a calculated ejection fraction of 60-65%. Injection of agitated saline via a peripheral vein reveals no evidence of a right-to-left shunt. Atrial septal occluder appears well seated with no significant interatrial shunt by color flow Doppler. The aortic valve is tricuspid with normal structure and function without stenosis. The peak transvalvular velocity is 1.27 m/s, the mean transvalvular gradient is 4.00 mmHg, and the LVOT/AV velocity ratio is 0.75. The peak transaortic gradient is 6.45 mmHg. There is no evidence of aortic regurgitation. Structurally normal mitral valve with normal leaflet excursion. There is mild mitral regurgitation. Structurally normal tricuspid valve with normal leaflet excursion. There is trace tricuspid regurgitation. No pericardial effusion is seen.  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

## 2024-03-06 LAB
ALBUMIN SERPL ELPH-MCNC: 4.6 G/DL
ALP BLD-CCNC: 56 U/L
ALT SERPL-CCNC: 19 U/L
ANION GAP SERPL CALC-SCNC: 10 MMOL/L
AST SERPL-CCNC: 21 U/L
BASOPHILS # BLD AUTO: 0.09 K/UL
BASOPHILS NFR BLD AUTO: 1.7 %
BILIRUB SERPL-MCNC: 0.5 MG/DL
BUN SERPL-MCNC: 17 MG/DL
CALCIUM SERPL-MCNC: 9.2 MG/DL
CHLORIDE SERPL-SCNC: 104 MMOL/L
CHOLEST SERPL-MCNC: 151 MG/DL
CO2 SERPL-SCNC: 26 MMOL/L
CREAT SERPL-MCNC: 0.97 MG/DL
EGFR: 70 ML/MIN/1.73M2
EOSINOPHIL # BLD AUTO: 0.14 K/UL
EOSINOPHIL NFR BLD AUTO: 2.7 %
ESTIMATED AVERAGE GLUCOSE: 103 MG/DL
GLUCOSE SERPL-MCNC: 92 MG/DL
HBA1C MFR BLD HPLC: 5.2 %
HCT VFR BLD CALC: 42.9 %
HDLC SERPL-MCNC: 69 MG/DL
HGB BLD-MCNC: 13.6 G/DL
IMM GRANULOCYTES NFR BLD AUTO: 0.2 %
LDLC SERPL CALC-MCNC: 71 MG/DL
LYMPHOCYTES # BLD AUTO: 1.77 K/UL
LYMPHOCYTES NFR BLD AUTO: 33.5 %
MAGNESIUM SERPL-MCNC: 2.2 MG/DL
MAN DIFF?: NORMAL
MCHC RBC-ENTMCNC: 29.2 PG
MCHC RBC-ENTMCNC: 31.7 GM/DL
MCV RBC AUTO: 92.3 FL
MONOCYTES # BLD AUTO: 0.36 K/UL
MONOCYTES NFR BLD AUTO: 6.8 %
NEUTROPHILS # BLD AUTO: 2.91 K/UL
NEUTROPHILS NFR BLD AUTO: 55.1 %
NONHDLC SERPL-MCNC: 82 MG/DL
PLATELET # BLD AUTO: 287 K/UL
POTASSIUM SERPL-SCNC: 4.4 MMOL/L
PROT SERPL-MCNC: 6.8 G/DL
RBC # BLD: 4.65 M/UL
RBC # FLD: 13.3 %
SODIUM SERPL-SCNC: 140 MMOL/L
TRIGL SERPL-MCNC: 53 MG/DL
TSH SERPL-ACNC: 0.89 UIU/ML
WBC # FLD AUTO: 5.28 K/UL

## 2024-03-07 LAB — APO LP(A) SERPL-MCNC: 15.9 NMOL/L

## 2024-03-11 NOTE — ED PROVIDER NOTE - CPE EDP NEURO NORM
Virtual Visit Details    Type of service:  Video Visit   Video Start Time: 3:29 PM  Video End Time: 4:05 PM    Originating Location (pt. Location): Home    Distant Location (provider location):  On-site  Platform used for Video Visit: Braxton Flores is a 33 year old who uses the pronouns he, him, his.      Diagnoses     Social anxiety disorder  R/o ADHD  Cannabis use disorder, unspecified severity, active  Cluster B traits     Assessment     Mark is a 33 year old who over the past few years has started to focus on his mental health concerns.    Today we discussed the following problems:    #mood dysregulation  #impulsivity  #fear of abandonment  He has a history of seeing a psychiatrist on an involuntary basis as part of a mandatory program for a few years where he was diagnosed and treated for a diagnosis of Bipolar Disorder. He states the diagnosis never resonated with him. His concerns about mood dysregulation, impulsivity and anger management difficulties were perceived as part of Bipolar disorder.    MSI-BPD was filled out today and Mark scored 7/10 which is suggestive of cluster b traits. He works closely with his therapist and is recommended to discuss this with her and get her opinions as well.     Mark has been off of Seroquel for a month and his morning sedation have improve significantly. He denies symptoms of kayla. Will continue monitoring.    #inattention  #impulsivity  #emotional reactivity  Overlap with symptoms of BPD. He has a hx of learning disability as a kid. He was never formally assessed by a clinician for ADHD. It is likely that the learning disability and inconsistent poor performance in some subjects be due to undiagnosed ADHD. He is scheduled for an assessment with ADHD clinic in Summer 2024. He is not interested in treatment at this point. Will continue following up.    #anxiety around strangers  #fear of negative judgment  #avoidance of social situations  Anxiety in the  social context appears to have developed in teenage years. CBT would be considered the gold standard treatment. Lexapro started in June 2023 was minimally effective at low dose and no improvement in the symptoms when tapered to 20 mg. He could not tolerate the 20 mg for longer than a few weeks due to sexual side effects.    He has been off of Lexapro and at 75 mg of Sertraline for 6 months. He has got very positive results from the sertraline, mainly decreasing anxiety and improving emotional lability. He denies any side effects including dizziness, nausea/vomiting, diarrhea or fatigue. He is interested in increasing the dose to 100 mg which is an appropriate next step. We increase the dose and reassess in ~6 weeks.    #cannabis use may contribute to inattention, anxiety and social challenges - smokes cannabis several times daily.     Psychotropic Drug Interactions:  [PSYCHCLINICDDI]  none  Management: N/A    [place] was not checked today: not using controlled substances    Risk Statements:   Treatment Risk- Risks, benefits, alternatives and potential adverse effects have been discussed and are understood.   Safety Risk-Mark did not appear to be an imminent safety risk to self or others.     Plan     1) Medications:   - Increase sertraline from 75 to 100 mg at bedtime      2) Psychotherapy: Continues individual therapy with Hannah    3) Next due:  Labs-  CBC, CMP, TSH, lipids and HgbA1c not completed  EKG- not indicated  Rating scales- none needed    4) Referrals:   None    5) Other: none    6) Follow-up: Return to clinic in 6 weeks     Pertinent Background                                                   [most recent eval 08/21/23]     The following section contains information copied from previous note by Dr Jaimes on 5/8/22 and has been reviewed, edited, and verified by the patient.     Mark first experienced anxiety for most of his childhood, until age 19 did not know his father because parents .  "Grew up with with IEP for LD with aggression/fights at school, depressive episodes in adolescence with SI and SIB. No hospitalizations, has been inconsistent with medications and  care until recently.   Describes difficulty with social queues that result in frustration and anxiety that he identifies with mild autism - no psychometric testing.    Pertinent Items Include: suicide attempt , suicidal ideation, SIB , aggression and substance use: cannabis     Subjective     -He had a lot of life events, is going to get  soon.  -Favorite author passed away.  -No issues with falling asleep off of quetiapine. Gets between 7 and 9 hours of sleep.  -Has nightmares from time to time.  -Appetite has been the same.  -Mood is \"more calm\".  -No SI/SIB/HI  -Sometimes gets overwhelmed but is manageable. Behind the wheel he sometimes gets road range.  -He does not do much socialization out of work but feels like he can  -D&D a couple of weeks ago  -No side effects from sertraline.  -No sexual side effects.  -He is interested in increasing to 100 mg.  -continues daily cannabis use, not interested in cutting down.  -alcohol use Monday nights and he goes to bars to eat and has a beer with food.    -Answers submitted by the patient for this visit:  Patient Health Questionnaire (Submitted on 3/11/2024)  If you checked off any problems, how difficult have these problems made it for you to do your work, take care of things at home, or get along with other people?: Somewhat difficult  PHQ9 TOTAL SCORE: 1  Anxiety significantly better on Sertraline    Current Social History:  Financial/occupational: food delivery  Living situation (partner, children, pets, etc): partner  Social/spiritual support: rachaelance  Feels safe at home: Yes    Pertinent Substance Use:   -alcohol: Yes: 3 nights a week  -cannabis: Yes: smokes cannabis several times daily    -tobacco: No  -caffeine:  Yes: 2 coffees daily   -opioids: No   Narcan Kit currrent: No "   -other: none      Medical Review of Systems:   Lightheadedness/orthostasis: None  Headaches: None  GI: None  Sexual health concerns: None    A comprehensive review of systems was performed and is negative other than noted above.     Mental Status Exam     Alertness: alert  and oriented  Appearance: well groomed  Behavior/Demeanor: cooperative, pleasant, and calm, with good  eye contact   Speech: normal and regular rate and rhythm  Language: intact and no problems  Psychomotor: normal or unremarkable  Mood: description consistent with euthymia  Affect: full range and appropriate;Reactive, congruent to: mood- yes, content- yes  Thought Process/Associations: unremarkable  Thought Content:  Reports none;  Denies suicidal & violent ideation and delusions  Perception:  Reports none;  Denies hallucinations  Insight: good  Judgment: good  Cognition: does  appear grossly intact; formal cognitive testing was not done  Gait and Station: N/A (teleContent Raven)     Vitals   There were no vitals taken for this visit.  Pulse Readings from Last 3 Encounters:   02/28/22 114   02/15/22 104   02/09/22 95     Wt Readings from Last 3 Encounters:   01/18/24 93 kg (205 lb)   02/28/22 93.4 kg (206 lb)   02/09/22 81.6 kg (180 lb)     BP Readings from Last 3 Encounters:   02/28/22 136/80   02/15/22 (!) 142/88   02/09/22 (!) 142/93         PAST MED TRIALS       Medication Max Dose (mg) Dates / Duration Helpful? DC Reason / Adverse Effects?   Celexa  20      Inadequate trial     Seroquel  200      Helps w sleep    Buspar        Sexual side effects    Lexapro  20  2023 - 6 months No   Not effective        Medications     Current Outpatient Medications   Medication Sig Dispense Refill    desonide (DESOWEN) 0.05 % external cream APPLY TWICE DAILY TO C, 3 WEEKS ON, 1 WEEK OF. REPEAT AS NEEDED      QUEtiapine (SEROQUEL) 25 MG tablet Take 1 tablet (25 mg) by mouth at bedtime 15 tablet 0    sertraline (ZOLOFT) 50 MG tablet Take 1.5 tablets (75 mg) by  mouth daily 90 tablet 1          Labs and Data         5/7/2023     1:31 PM 8/21/2023     2:28 PM 12/18/2023     2:36 PM   PROMIS-10 Total Score w/o Sub Scores   PROMIS TOTAL - SUBSCORES 23 40 26         5/7/2023     1:32 PM   CAGE-AID Total Score   Total Score 1   Total Score MyChart 1 (A total score of 2 or greater is considered clinically significant)         12/18/2023     2:31 PM 1/18/2024     3:12 PM 3/11/2024     3:18 PM   PHQ-9 SCORE   PHQ-9 Total Score MyChart 6 (Mild depression) 4 (Minimal depression) 1 (Minimal depression)   PHQ-9 Total Score 6 4 1         9/20/2021    10:20 AM 5/7/2023     1:29 PM   JUDITH-7 SCORE   Total Score  16 (severe anxiety)   Total Score 21 16       Liver/Kidney Function, TSH Metabolic Blood counts   No lab results found.  No lab results found. Recent Labs   Lab Test 04/03/23  1139   CHOL 224*   TRIG 76   *   HDL 40     Recent Labs   Lab Test 04/03/23  1139   A1C 5.2     No lab results found. No lab results found.        No new ECG on file      Level of Medical Decision Making:   - At least 1 chronic problem that is not stable  - Engaged in prescription drug management during visit (discussed any medication benefits, side effects, alternatives, etc.)       Psychiatry Individual Psychotherapy Note   Psychotherapy start time - 3:45 PM  Psychotherapy end time - 4:05 PM  Date treatment plan last reviewed with patient - 01/18/24  Subjective: This supportive psychotherapy session addressed issues related to goals of therapy and current psychosocial stressors. Patient's reaction: Contemplation in the context of mental status appropriate for ambulatory setting.    Interactive complexity indicated? No  Plan: RTC in timeframe noted above  Psychotherapy services during this visit included myself and the patient.   Treatment Plan      SYMPTOMS; PROBLEMS   MEASURABLE GOALS;    FUNCTIONAL IMPROVEMENT / GAINS INTERVENTIONS DISCHARGE CRITERIA   ADHD: difficulty paying attention , avoids  normal... tasks which require prolonged mental effort, and oversharing   Decreasing impulsive conversations Supportive / psychodynamic marked symptom improvement        PROVIDER: Elton Gentile MD       Patient staffed in clinic with Dr. Norris who will sign the note.  Supervisor is Dr. Garcia.

## 2024-04-04 NOTE — DISCHARGE NOTE NURSING/CASE MANAGEMENT/SOCIAL WORK - NSTRANSFEREYEGLASSESPAIRS_GEN_A_NUR
Name:  Miller Gutiérrez  YOB: 1949   MRN: [id number]      Office Visit: 4/4/2024        ASSESSMENT AND PLAN:  (Medical Decision Making)    Impression: 74 y.o. male with former tobacco abuse, diagnosed with R PE in Feb 2024. On eliquis, tolerating well.   Ongoing SOB above what he thinks is his normal.   TTE then without R heart strain. Possible COPD but not typical symptoms.   He does have R>L pleural effusions present for the first time in December and no attempt at drainage. Was told these were chronic at [facility] but they do not appear to be so. Likely started in the setting of his intussusception.     -will set him up for thoracentesis.   In office US performed of B thorax with only visible fluid on the right.     -discussed course of anticoagulation with him. At least 3 months and preferably 6. Increased sedentary lifestyle was likely the cause.   -COPD may also be present as a cause for his TRAMMELL. Will check cPFT\"s after his thora has been completed. Start Incruse if COPD found. Already has prn albuterol.   -does not qualify for lung ca screening ct.     FU in 3 months.       There are no diagnoses linked to this encounter.  No orders of the defined types were placed in this encounter.    No orders of the defined types were placed in this encounter.      Immanuel Chin MD    No specialty comments available.    _________________________________________________________________________    HISTORY OF PRESENT ILLNESS:    Mr. Miller Gutiérrez is a 74 y.o. male who is seen at [facility] today for  No chief complaint on file.   [facility] as [facility] He has a history of HTN, hep C. He states that he was seen here years ago for hemoptysis. Underwent bronchoscopy without any significant findings, was treated with abx and symptoms resolve.   He had PNA in 2011 which was treated through the ER.   Former smoker. Total of 30 pk/yrs, quit in 2008.     He had surgery 
1 pair

## 2024-04-15 NOTE — PRE-OP CHECKLIST - NS PREOP CHK TEST_COVID_DT_GEN_ALL_CORE
[] : The components of the vaccine(s) to be administered today are listed in the plan of care. The disease(s) for which the vaccine(s) are intended to prevent and the risks have been discussed with the caretaker.  The risks are also included in the appropriate vaccination information statements which have been provided to the patient's caregiver.  The caregiver has given consent to vaccinate. [FreeTextEntry1] : Continue balanced diet with all food groups. Brush teeth twice a day with toothbrush. Recommend visit to dentist. Maintain consistent daily routines and sleep schedule. Personal hygiene, puberty, and sexual health reviewed. Risky behaviors assessed. School discussed. Limit screen time to no more than 2 hours per day. Encourage physical activity. CLEARED FOR SPORTS PARTICIPATION f/u next [facility] in 1 year 21-Mar-2022 12:25

## 2024-04-25 ENCOUNTER — TRANSCRIPTION ENCOUNTER (OUTPATIENT)
Age: 54
End: 2024-04-25

## 2024-04-26 ENCOUNTER — TRANSCRIPTION ENCOUNTER (OUTPATIENT)
Age: 54
End: 2024-04-26

## 2024-05-01 ENCOUNTER — OUTPATIENT (OUTPATIENT)
Dept: OUTPATIENT SERVICES | Facility: HOSPITAL | Age: 54
LOS: 1 days | End: 2024-05-01
Payer: MEDICAID

## 2024-05-01 ENCOUNTER — RESULT REVIEW (OUTPATIENT)
Age: 54
End: 2024-05-01

## 2024-05-01 DIAGNOSIS — R00.2 PALPITATIONS: ICD-10-CM

## 2024-05-01 DIAGNOSIS — Q21.1 ATRIAL SEPTAL DEFECT: Chronic | ICD-10-CM

## 2024-05-01 PROCEDURE — 93306 TTE W/DOPPLER COMPLETE: CPT

## 2024-05-01 PROCEDURE — 93306 TTE W/DOPPLER COMPLETE: CPT | Mod: 26

## 2024-05-02 ENCOUNTER — NON-APPOINTMENT (OUTPATIENT)
Age: 54
End: 2024-05-02

## 2024-05-03 RX ORDER — AMLODIPINE BESYLATE 5 MG/1
5 TABLET ORAL
Qty: 30 | Refills: 6 | Status: ACTIVE | COMMUNITY
Start: 2024-05-03 | End: 1900-01-01

## 2024-05-14 RX ORDER — ATORVASTATIN CALCIUM 20 MG/1
20 TABLET, FILM COATED ORAL
Qty: 90 | Refills: 3 | Status: ACTIVE | COMMUNITY
Start: 2022-07-11 | End: 1900-01-01

## 2024-05-31 ENCOUNTER — TRANSCRIPTION ENCOUNTER (OUTPATIENT)
Age: 54
End: 2024-05-31

## 2024-06-02 ENCOUNTER — TRANSCRIPTION ENCOUNTER (OUTPATIENT)
Age: 54
End: 2024-06-02

## 2024-06-13 ENCOUNTER — APPOINTMENT (OUTPATIENT)
Dept: NEUROLOGY | Facility: CLINIC | Age: 54
End: 2024-06-13
Payer: MEDICAID

## 2024-06-13 VITALS
DIASTOLIC BLOOD PRESSURE: 83 MMHG | TEMPERATURE: 96.4 F | OXYGEN SATURATION: 100 % | WEIGHT: 155 LBS | HEIGHT: 65 IN | HEART RATE: 75 BPM | BODY MASS INDEX: 25.83 KG/M2 | SYSTOLIC BLOOD PRESSURE: 119 MMHG

## 2024-06-13 DIAGNOSIS — Q21.12 PATENT FORAMEN OVALE: ICD-10-CM

## 2024-06-13 DIAGNOSIS — R90.89 OTHER ABNORMAL FINDINGS ON DIAGNOSTIC IMAGING OF CENTRAL NERVOUS SYSTEM: ICD-10-CM

## 2024-06-13 DIAGNOSIS — G43.909 MIGRAINE, UNSPECIFIED, NOT INTRACTABLE, W/OUT STATUS MIGRAINOSUS: ICD-10-CM

## 2024-06-13 PROCEDURE — G2211 COMPLEX E/M VISIT ADD ON: CPT | Mod: NC

## 2024-06-13 PROCEDURE — 99214 OFFICE O/P EST MOD 30 MIN: CPT

## 2024-06-13 RX ORDER — RIMEGEPANT SULFATE 75 MG/75MG
75 TABLET, ORALLY DISINTEGRATING ORAL
Qty: 8 | Refills: 1 | Status: ACTIVE | COMMUNITY
Start: 2021-12-02 | End: 1900-01-01

## 2024-06-13 NOTE — HISTORY OF PRESENT ILLNESS
[FreeTextEntry1] : The patient is a very pleasant 53 year-old female admitted 11/2021 with acute right superior cerebellum/peduncle and left occipital stroke. Workup was unrevealing apart from PFO which she underwent closure 3/2021. She is here for follow-up.  Since her last visit, the patient has had no recurrent symptoms concerning for stroke/TIA. She remains on statin, aspirin therapy, tolerating them well. Repeat echo is stable. Migraines are improved overall. She is very happy to report her mood/PTSD has improved substantially with time.

## 2024-06-13 NOTE — ASSESSMENT
[FreeTextEntry1] : The patient is a very pleasant 53 year-old female admitted 11/2021 with acute right superior cerebellum/peduncle and left occipital stroke secondary to PFO s/p closure 3/2021. She has had no recurrent stroke-like symptoms. She will continue ASA 81, statin therapy. RTC 1 year, sooner if needed.

## 2024-06-17 ENCOUNTER — APPOINTMENT (OUTPATIENT)
Dept: HEART AND VASCULAR | Facility: CLINIC | Age: 54
End: 2024-06-17
Payer: MEDICAID

## 2024-06-17 VITALS
HEART RATE: 61 BPM | WEIGHT: 155 LBS | HEIGHT: 65 IN | SYSTOLIC BLOOD PRESSURE: 117 MMHG | DIASTOLIC BLOOD PRESSURE: 72 MMHG | BODY MASS INDEX: 25.83 KG/M2

## 2024-06-17 PROCEDURE — 93000 ELECTROCARDIOGRAM COMPLETE: CPT

## 2024-06-17 PROCEDURE — 99213 OFFICE O/P EST LOW 20 MIN: CPT | Mod: 25

## 2024-06-17 PROCEDURE — G2211 COMPLEX E/M VISIT ADD ON: CPT | Mod: NC

## 2024-06-17 RX ORDER — METHYLPREDNISOLONE 4 MG/1
4 TABLET ORAL
Qty: 1 | Refills: 0 | Status: DISCONTINUED | COMMUNITY
Start: 2023-10-11 | End: 2024-06-17

## 2024-06-17 RX ORDER — METHOCARBAMOL 500 MG/1
500 TABLET, FILM COATED ORAL EVERY 8 HOURS
Qty: 30 | Refills: 1 | Status: DISCONTINUED | COMMUNITY
Start: 2023-10-11 | End: 2024-06-17

## 2024-06-18 NOTE — HISTORY OF PRESENT ILLNESS
[de-identified] : 53 year old female who had a CVA 11/2021 s/p ILR to monitor for occult atrial fibrillation.  She is s/p PFO closure with Dr. Adair on 3/23/22.  She had an episode of syncope during IV placement pre procedure that corresponded with a sinus pause (20 seconds).  No AT/AF identified since device placement.  The ILR was laterally displaced; now s/p exploration and extraction under moderate sedation 7/20/22.  She had noted palpitations with hot flashes previously, but now noting palpitations without any other associated symptoms since February 2024.  Now occurring several times per month.  Episodes can last for an hour.  She describes a "flipping" sensation in her chest with a pulsating sensation in her head.  Walks 5-6 miles daily, does yoga and dances without limitation.  Wore one week monitor with Dr. Rogers-- thinks she had "subtle symptoms".

## 2024-06-18 NOTE — CARDIOLOGY SUMMARY
[de-identified] : 6/17/24 SB @ 55 bpm  [de-identified] : TTE 5/2024 Normal biventricular size and function, atrial septal occluder noted, no significant valvular disease

## 2024-06-18 NOTE — DISCUSSION/SUMMARY
[FreeTextEntry1] : 53 year old female who had a CVA 11/2021 s/p ILR to monitor for occult AFib.  Now s/p PFO closure with Dr. Adair.  She has had no AT/AF identified since device placement.  The ILR was laterally displaced; now s/p exploration and extraction under moderate sedation 7/20/22.  She presents to re establish care as she has been experiencing palpitations.  Will review results of LTM when they are available.  Discussed that symptoms are most likely secondary to perimenopausal hormone changes.  She will discuss with her hematologist and OB in regards to options for HRT.  Will plan to repeat ambulatory telemetry in six months and she will utilize her apple watch for heart rhythm assessment in the interim.

## 2024-06-18 NOTE — ADDENDUM
[FreeTextEntry1] : I, Carlene Doran, am scribing for and the presence of Dr. Jacques the following sections: HPI, PMH,Family/social history, ROS, Physical Exam, Assessment / Plan.   I, Yesenia Jacques, personally performed the services described in the documentation, reviewed the documentation recorded by the scribe in my presence and it accurately and completely records my words and actions.

## 2024-07-28 ENCOUNTER — NON-APPOINTMENT (OUTPATIENT)
Age: 54
End: 2024-07-28

## 2024-07-29 ENCOUNTER — APPOINTMENT (OUTPATIENT)
Dept: HEART AND VASCULAR | Facility: CLINIC | Age: 54
End: 2024-07-29
Payer: MEDICAID

## 2024-07-29 ENCOUNTER — RX RENEWAL (OUTPATIENT)
Age: 54
End: 2024-07-29

## 2024-07-29 ENCOUNTER — NON-APPOINTMENT (OUTPATIENT)
Age: 54
End: 2024-07-29

## 2024-07-29 VITALS — DIASTOLIC BLOOD PRESSURE: 80 MMHG | SYSTOLIC BLOOD PRESSURE: 110 MMHG

## 2024-07-29 VITALS
TEMPERATURE: 97.7 F | SYSTOLIC BLOOD PRESSURE: 118 MMHG | WEIGHT: 151 LBS | BODY MASS INDEX: 25.16 KG/M2 | HEIGHT: 65 IN | DIASTOLIC BLOOD PRESSURE: 84 MMHG | OXYGEN SATURATION: 96 % | HEART RATE: 80 BPM

## 2024-07-29 PROCEDURE — 99214 OFFICE O/P EST MOD 30 MIN: CPT

## 2024-07-29 NOTE — HISTORY OF PRESENT ILLNESS
[FreeTextEntry1] : 55 y/o female with h/o cva 2021, migraine, pfo repair, pad, htn who presents for f/up today.   last seen  3/24 echo and holter ordered for palps  -Holter 3/24: nsr, avg 76, rare pac's, pvc's  -Echo 5/24:   1. Normal left ventricular size and systolic function.  2. Normal right ventricular size and systolic function.  3. Normal atria.  4. Atrial septal occluder appears well seated with no significant interatrial shunt by color flow Doppler.  5. No significant valvular disease.  6. Trivial pericardial effusion.  7. Compared to the previous TTE performed on 4/24/2023, there have been no significant interval changes.  -LpA normal no cp, sob, palpitations, lh, syncope, edema, orthopnea, pnd  had ILR in for 6 months - no af/at saw Georgie in past  seen by heme for hypercoag w/up - normal except for ddimer  former patient Dr. Hugo    admitted 11/2021 with acute right superior cerebellum/peduncle and left occipital stroke. Workup was unrevealing apart from PFO which she underwent closure 3/2022.  followed by neuro followed by Dr. Adair  seen by EP last 2022 had ILR removed 2022   elevated bp but not on meds walks 6 miles/day   -CTA cor 4/23: normal, no cad -ECHO 4/24/2023: The left ventricle is normal in size, wall thickness, and systolic function with a calculated ejection fraction of 60-65%. Injection of agitated saline via a peripheral vein reveals no evidence of a right-to-left shunt. Atrial septal occluder appears well seated with no significant interatrial shunt by color flow Doppler. The aortic valve is tricuspid with normal structure and function without stenosis. The peak transvalvular velocity is 1.27 m/s, the mean transvalvular gradient is 4.00 mmHg, and the LVOT/AV velocity ratio is 0.75. The peak transaortic gradient is 6.45 mmHg. There is no evidence of aortic regurgitation. Structurally normal mitral valve with normal leaflet excursion. There is mild mitral regurgitation. Structurally normal tricuspid valve with normal leaflet excursion. There is trace tricuspid regurgitation. No pericardial effusion is seen.  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

## 2024-07-29 NOTE — DISCUSSION/SUMMARY
[Patient] : the patient [___ Month(s)] : in [unfilled] month(s) [FreeTextEntry1] : 53 y/o female with h/o cva 2021, migraine, pfo repair, pad, htn who presents for f/up today.  -Echo 5/24:   1. Normal left ventricular size and systolic function.  2. Normal right ventricular size and systolic function.  3. Normal atria.  4. Atrial septal occluder appears well seated with no significant interatrial shunt by color flow Doppler.  5. No significant valvular disease.  6. Trivial pericardial effusion.  7. Compared to the previous TTE performed on 4/24/2023, there have been no significant interval changes. -Holter 3/24: nsr, avg 76, rare pac's, pvc's -ekg 7/24 reviewed -labs 2024 reviewed  -ABPM 5/19-5/20/22: Avg /83 -CTA cor 4/23: normal, no cad -ECHO 4/24/2023: The left ventricle is normal in size, wall thickness, and systolic function with a calculated ejection fraction of 60-65%. Injection of agitated saline via a peripheral vein reveals no evidence of a right-to-left shunt. Atrial septal occluder appears well seated with no significant interatrial shunt by color flow Doppler. The aortic valve is tricuspid with normal structure and function without stenosis. The peak transvalvular velocity is 1.27 m/s, the mean transvalvular gradient is 4.00 mmHg, and the LVOT/AV velocity ratio is 0.75. The peak transaortic gradient is 6.45 mmHg. There is no evidence of aortic regurgitation. Structurally normal mitral valve with normal leaflet excursion. There is mild mitral regurgitation. Structurally normal tricuspid valve with normal leaflet excursion. There is trace tricuspid regurgitation. No pericardial effusion is seen. -LITO 2021: ef 65%, no clot, interatrial septal aneurysm, pfo, trace mr/tr/pr, mild nonmobile plaque desc ao, mild nonmobile plaque ao arch -11/9/22 ETT: normal 10.3 METs -sleep study 2022: negative -normal LpA -continue norvasc -continue asa, statin -given cva and h/o aortic arch plaque - increase to lipitor 40 -neuro f/up for migraine, cva -heme recs re: hypercoag w/up - normal except for ddimer elevated - she will f/up wtih them -ekg 3/24- nsr, normal intervals, no st/t changes -f/up with EP s/p ILR removal   -counseled on cvd rsik factors -f/up 3 month for cvd risk factors, lipids  I have spent 30 minutes reviewing labs, records, tests and discussed cvd risk factors, pfo repair

## 2024-08-05 ENCOUNTER — APPOINTMENT (OUTPATIENT)
Dept: HEART AND VASCULAR | Facility: CLINIC | Age: 54
End: 2024-08-05

## 2024-08-05 ENCOUNTER — NON-APPOINTMENT (OUTPATIENT)
Age: 54
End: 2024-08-05

## 2024-08-05 PROCEDURE — 99213 OFFICE O/P EST LOW 20 MIN: CPT | Mod: 25

## 2024-08-05 PROCEDURE — 93000 ELECTROCARDIOGRAM COMPLETE: CPT

## 2024-08-05 NOTE — OBJECTIVE
Hpi Title: Evaluation of Skin Lesions How Severe Are Your Spot(S)?: mild Have Your Spot(S) Been Treated In The Past?: has not been treated [History reviewed] : History reviewed. [Medications and Allergies reviewed] : Medications and allergies reviewed.

## 2024-08-12 NOTE — CARDIOLOGY SUMMARY
[de-identified] : 8/5/24 SR @ 62 bpm  6/17/24 SB @ 55 bpm  [de-identified] : [employer] 3/5/ - 3/11/24: SR (44-), no AFib, symptom events correspond with SR [de-identified] : TTE 5/2024 Normal biventricular size and function, atrial septal occluder noted, no significant valvular disease

## 2024-08-12 NOTE — CARDIOLOGY SUMMARY
[de-identified] : 8/5/24 SR @ 62 bpm  6/17/24 SB @ 55 bpm  [de-identified] : [employer] 3/5/ - 3/11/24: SR (44-), no AFib, symptom events correspond with SR [de-identified] : TTE 5/2024 Normal biventricular size and function, atrial septal occluder noted, no significant valvular disease

## 2024-08-12 NOTE — HISTORY OF PRESENT ILLNESS
[de-identified] : 54 year old female who had a CVA 11/2021 s/p ILR to monitor for occult atrial fibrillation.  She is s/p PFO closure with Dr. Adair on 3/23/22.  She had an episode of syncope during IV placement pre procedure that corresponded with a sinus pause (20 seconds).  No AT/AF identified since device placement.  The ILR was laterally displaced; now s/p exploration and extraction under moderate sedation 7/20/22.  She had noted palpitations with hot flashes previously, but then started having palpitations without any other associated symptoms since February 2024.  LTM thereafter signfiicant for sinus rhythm with symptoms.    She has been feeling well from the heart rhythm perspective recently.    Walks 5-6 miles daily, does yoga and dances without limitation.

## 2024-08-12 NOTE — HISTORY OF PRESENT ILLNESS
[de-identified] : 54 year old female who had a CVA 11/2021 s/p ILR to monitor for occult atrial fibrillation.  She is s/p PFO closure with Dr. Adair on 3/23/22.  She had an episode of syncope during IV placement pre procedure that corresponded with a sinus pause (20 seconds).  No AT/AF identified since device placement.  The ILR was laterally displaced; now s/p exploration and extraction under moderate sedation 7/20/22.  She had noted palpitations with hot flashes previously, but then started having palpitations without any other associated symptoms since February 2024.  LTM thereafter signfiicant for sinus rhythm with symptoms.    She has been feeling well from the heart rhythm perspective recently.    Walks 5-6 miles daily, does yoga and dances without limitation.

## 2024-08-12 NOTE — CARDIOLOGY SUMMARY
[de-identified] : 8/5/24 SR @ 62 bpm  6/17/24 SB @ 55 bpm  [de-identified] : [employer] 3/5/ - 3/11/24: SR (44-), no AFib, symptom events correspond with SR [de-identified] : TTE 5/2024 Normal biventricular size and function, atrial septal occluder noted, no significant valvular disease  Orthopedic

## 2024-08-12 NOTE — DISCUSSION/SUMMARY
[FreeTextEntry1] : 54 year old female who had a CVA 11/2021 s/p ILR to monitor for occult AFib.  Now s/p PFO closure with Dr. Adair.  She has had no AT/AF identified since device placement.  The ILR was laterally displaced; now s/p exploration and extraction under moderate sedation 7/20/22.  Recent episodes of palpitations correspond with sinus rhythm.  No evidence for sustained arrhythmias.  Will plan to repeat ambulatory telemetry in 6-12 months pending clinical progress.  She will utilize her apple watch for heart rhythm assessment in the interim.

## 2024-08-12 NOTE — HISTORY OF PRESENT ILLNESS
[de-identified] : 54 year old female who had a CVA 11/2021 s/p ILR to monitor for occult atrial fibrillation.  She is s/p PFO closure with Dr. Adair on 3/23/22.  She had an episode of syncope during IV placement pre procedure that corresponded with a sinus pause (20 seconds).  No AT/AF identified since device placement.  The ILR was laterally displaced; now s/p exploration and extraction under moderate sedation 7/20/22.  She had noted palpitations with hot flashes previously, but then started having palpitations without any other associated symptoms since February 2024.  LTM thereafter signfiicant for sinus rhythm with symptoms.    She has been feeling well from the heart rhythm perspective recently.    Walks 5-6 miles daily, does yoga and dances without limitation.

## 2024-09-06 ENCOUNTER — RX RENEWAL (OUTPATIENT)
Age: 54
End: 2024-09-06

## 2024-09-27 ENCOUNTER — TRANSCRIPTION ENCOUNTER (OUTPATIENT)
Age: 54
End: 2024-09-27

## 2024-10-11 ENCOUNTER — RX RENEWAL (OUTPATIENT)
Age: 54
End: 2024-10-11

## 2024-10-22 ENCOUNTER — APPOINTMENT (OUTPATIENT)
Dept: HEART AND VASCULAR | Facility: CLINIC | Age: 54
End: 2024-10-22
Payer: MEDICAID

## 2024-10-22 VITALS
HEART RATE: 71 BPM | BODY MASS INDEX: 22.82 KG/M2 | TEMPERATURE: 98 F | HEIGHT: 65 IN | WEIGHT: 137 LBS | SYSTOLIC BLOOD PRESSURE: 108 MMHG | DIASTOLIC BLOOD PRESSURE: 76 MMHG | OXYGEN SATURATION: 99 %

## 2024-10-22 PROCEDURE — 99214 OFFICE O/P EST MOD 30 MIN: CPT | Mod: 25

## 2024-10-22 PROCEDURE — 36415 COLL VENOUS BLD VENIPUNCTURE: CPT

## 2024-10-23 LAB
CHOLEST SERPL-MCNC: 145 MG/DL
HDLC SERPL-MCNC: 51 MG/DL
LDLC SERPL CALC-MCNC: 83 MG/DL
NONHDLC SERPL-MCNC: 94 MG/DL
TRIGL SERPL-MCNC: 52 MG/DL

## 2024-11-13 ENCOUNTER — TRANSCRIPTION ENCOUNTER (OUTPATIENT)
Age: 54
End: 2024-11-13

## 2024-12-03 ENCOUNTER — RX RENEWAL (OUTPATIENT)
Age: 54
End: 2024-12-03

## 2024-12-09 ENCOUNTER — TRANSCRIPTION ENCOUNTER (OUTPATIENT)
Age: 54
End: 2024-12-09

## 2024-12-10 ENCOUNTER — TRANSCRIPTION ENCOUNTER (OUTPATIENT)
Age: 54
End: 2024-12-10

## 2024-12-26 NOTE — ED ADULT NURSE NOTE - BEFAST SCREENING
Saturation: Site=PA (Pulmonary Artery) , O2=65 %, Hgb=13.9 gm/dl, Condition=Condition 1. Used in calculation. Negative

## 2025-01-03 ENCOUNTER — RX RENEWAL (OUTPATIENT)
Age: 55
End: 2025-01-03

## 2025-01-09 NOTE — ED PROVIDER NOTE - PHYSICAL EXAMINATION
Subjective   Patient ID: Jose Brown is a 52 y.o. male.    HPI  Status post umbilical hernia pair with mesh 2 weeks ago.  Doing well.  Reports pain the first day or 2 has resolved.  Occasional discomfort with getting up from sitting position.  Tolerating p.o. with normal BMs.    2 small skin tags on the back were cleansed with alcohol swab and injected with 2 mL of 1% lidocaine.  Elevated with forceps and transected at the base.  Hemostasis achieved with silver nitrate and Band-Aid applied  Review of Systems       Objective   Physical Exam   Wound healed with collagen ridge noted  Repair intact    Assessment    Diagnosis Orders   1. Umbilical hernia without obstruction and without gangrene               Plan   Recovering well  Continue light lifting another 4 weeks.  No restrictions beginning of February  Return as needed        Gerardo Flynn MD  
HPI - This is a female patient with prior history of an ischemic stroke with no residual deficits secondary to a PFO that has been since closed.  Patient is also perimenopausal.  Patient has been experiencing palpitation sensation that she describes as a sensation of her heart flipping in her chest that started last week and she noticed that when she walks or exerts herself.  In the last several days has been happening more frequently so patient decided to come to the emergency room.  Republic slightly lightheaded at times but this has since resolved.  Patient denies any headache neck pain chest pain or shortness of breath.  No sensation of irregular heartbeats, no sensation of rapid heartbeat.  Her medications include atorvastatin, amlodipine for hypertension, baby aspirin daily and vitamin D.  Patient denies any fever chills abdominal pain nausea vomiting diarrhea constipation focal weakness or any other symptoms at this time.  Patient has good follow-up with cardiology and is due to see them for follow-up next month.    EKG is a normal sinus rhythm with a heart rate of 69, no ST-T changes.    On examination patient is alert and oriented and able to provide all history, no distress.  Normal heart sounds, normal lung sounds, abdomen nontender.  Neuroexam normal.  Skin normal.

## 2025-01-21 ENCOUNTER — APPOINTMENT (OUTPATIENT)
Dept: HEART AND VASCULAR | Facility: CLINIC | Age: 55
End: 2025-01-21
Payer: MEDICAID

## 2025-01-21 VITALS
DIASTOLIC BLOOD PRESSURE: 79 MMHG | HEART RATE: 85 BPM | WEIGHT: 137 LBS | BODY MASS INDEX: 22.82 KG/M2 | SYSTOLIC BLOOD PRESSURE: 109 MMHG | OXYGEN SATURATION: 96 % | HEIGHT: 65 IN | TEMPERATURE: 95.8 F

## 2025-01-21 PROCEDURE — 93000 ELECTROCARDIOGRAM COMPLETE: CPT

## 2025-01-21 PROCEDURE — 36415 COLL VENOUS BLD VENIPUNCTURE: CPT

## 2025-01-21 PROCEDURE — 99214 OFFICE O/P EST MOD 30 MIN: CPT | Mod: 25

## 2025-01-22 LAB
CHOLEST SERPL-MCNC: 128 MG/DL
HDLC SERPL-MCNC: 68 MG/DL
LDLC SERPL CALC-MCNC: 50 MG/DL
NONHDLC SERPL-MCNC: 60 MG/DL
TRIGL SERPL-MCNC: 39 MG/DL

## 2025-01-28 ENCOUNTER — APPOINTMENT (OUTPATIENT)
Dept: NEUROLOGY | Facility: CLINIC | Age: 55
End: 2025-01-28
Payer: MEDICAID

## 2025-01-28 DIAGNOSIS — I63.9 CEREBRAL INFARCTION, UNSPECIFIED: ICD-10-CM

## 2025-01-28 DIAGNOSIS — G43.909 MIGRAINE, UNSPECIFIED, NOT INTRACTABLE, W/OUT STATUS MIGRAINOSUS: ICD-10-CM

## 2025-01-28 PROCEDURE — 99214 OFFICE O/P EST MOD 30 MIN: CPT | Mod: 95

## 2025-02-13 ENCOUNTER — RX RENEWAL (OUTPATIENT)
Age: 55
End: 2025-02-13

## 2025-04-09 ENCOUNTER — RX RENEWAL (OUTPATIENT)
Age: 55
End: 2025-04-09

## 2025-05-09 ENCOUNTER — NON-APPOINTMENT (OUTPATIENT)
Age: 55
End: 2025-05-09

## 2025-05-12 ENCOUNTER — APPOINTMENT (OUTPATIENT)
Dept: HEART AND VASCULAR | Facility: CLINIC | Age: 55
End: 2025-05-12
Payer: MEDICAID

## 2025-05-12 ENCOUNTER — NON-APPOINTMENT (OUTPATIENT)
Age: 55
End: 2025-05-12

## 2025-05-12 VITALS
TEMPERATURE: 97.2 F | HEIGHT: 65 IN | WEIGHT: 134 LBS | OXYGEN SATURATION: 97 % | HEART RATE: 82 BPM | DIASTOLIC BLOOD PRESSURE: 75 MMHG | BODY MASS INDEX: 22.33 KG/M2 | SYSTOLIC BLOOD PRESSURE: 112 MMHG

## 2025-05-12 PROCEDURE — 36415 COLL VENOUS BLD VENIPUNCTURE: CPT

## 2025-05-12 PROCEDURE — 99214 OFFICE O/P EST MOD 30 MIN: CPT | Mod: 25

## 2025-05-13 LAB
ALBUMIN SERPL ELPH-MCNC: 4.3 G/DL
ALP BLD-CCNC: 67 U/L
ALT SERPL-CCNC: 14 U/L
ANION GAP SERPL CALC-SCNC: 14 MMOL/L
AST SERPL-CCNC: 22 U/L
BASOPHILS # BLD AUTO: 0.08 K/UL
BASOPHILS NFR BLD AUTO: 1.2 %
BILIRUB SERPL-MCNC: 0.4 MG/DL
BUN SERPL-MCNC: 19 MG/DL
CALCIUM SERPL-MCNC: 8.8 MG/DL
CHLORIDE SERPL-SCNC: 103 MMOL/L
CHOLEST SERPL-MCNC: 127 MG/DL
CO2 SERPL-SCNC: 23 MMOL/L
CREAT SERPL-MCNC: 0.94 MG/DL
CREAT SPEC-SCNC: 248 MG/DL
EGFRCR SERPLBLD CKD-EPI 2021: 72 ML/MIN/1.73M2
EOSINOPHIL # BLD AUTO: 0.11 K/UL
EOSINOPHIL NFR BLD AUTO: 1.6 %
ESTIMATED AVERAGE GLUCOSE: 105 MG/DL
GLUCOSE SERPL-MCNC: 99 MG/DL
HBA1C MFR BLD HPLC: 5.3 %
HCT VFR BLD CALC: 41.3 %
HDLC SERPL-MCNC: 64 MG/DL
HGB BLD-MCNC: 13.6 G/DL
IMM GRANULOCYTES NFR BLD AUTO: 0.1 %
LDLC SERPL-MCNC: 53 MG/DL
LYMPHOCYTES # BLD AUTO: 1.68 K/UL
LYMPHOCYTES NFR BLD AUTO: 25 %
MAGNESIUM SERPL-MCNC: 2 MG/DL
MAN DIFF?: NORMAL
MCHC RBC-ENTMCNC: 29.6 PG
MCHC RBC-ENTMCNC: 32.9 G/DL
MCV RBC AUTO: 90 FL
MICROALBUMIN 24H UR DL<=1MG/L-MCNC: 1.2 MG/DL
MICROALBUMIN/CREAT 24H UR-RTO: 5 MG/G
MONOCYTES # BLD AUTO: 0.45 K/UL
MONOCYTES NFR BLD AUTO: 6.7 %
NEUTROPHILS # BLD AUTO: 4.38 K/UL
NEUTROPHILS NFR BLD AUTO: 65.4 %
NONHDLC SERPL-MCNC: 64 MG/DL
PLATELET # BLD AUTO: 292 K/UL
POTASSIUM SERPL-SCNC: 4.1 MMOL/L
PROT SERPL-MCNC: 6.7 G/DL
RBC # BLD: 4.59 M/UL
RBC # FLD: 13.3 %
SODIUM SERPL-SCNC: 140 MMOL/L
TRIGL SERPL-MCNC: 45 MG/DL
TSH SERPL-ACNC: 1.02 UIU/ML
WBC # FLD AUTO: 6.71 K/UL

## 2025-05-19 NOTE — ED ADULT TRIAGE NOTE - CCCP TRG CHIEF CMPLNT
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

## 2025-06-25 ENCOUNTER — RX RENEWAL (OUTPATIENT)
Age: 55
End: 2025-06-25

## 2025-07-07 ENCOUNTER — RX RENEWAL (OUTPATIENT)
Age: 55
End: 2025-07-07

## 2025-07-10 ENCOUNTER — EMERGENCY (EMERGENCY)
Facility: HOSPITAL | Age: 55
LOS: 1 days | End: 2025-07-10
Attending: EMERGENCY MEDICINE | Admitting: EMERGENCY MEDICINE
Payer: MEDICAID

## 2025-07-10 VITALS
TEMPERATURE: 98 F | HEART RATE: 85 BPM | DIASTOLIC BLOOD PRESSURE: 90 MMHG | OXYGEN SATURATION: 97 % | RESPIRATION RATE: 16 BRPM | SYSTOLIC BLOOD PRESSURE: 135 MMHG

## 2025-07-10 DIAGNOSIS — Q21.1 ATRIAL SEPTAL DEFECT: Chronic | ICD-10-CM

## 2025-07-10 LAB
ALBUMIN SERPL ELPH-MCNC: 4.3 G/DL — SIGNIFICANT CHANGE UP (ref 3.4–5)
ALP SERPL-CCNC: 76 U/L — SIGNIFICANT CHANGE UP (ref 40–120)
ALT FLD-CCNC: 19 U/L — SIGNIFICANT CHANGE UP (ref 12–42)
ANION GAP SERPL CALC-SCNC: 7 MMOL/L — LOW (ref 9–16)
APPEARANCE UR: CLEAR — SIGNIFICANT CHANGE UP
AST SERPL-CCNC: 18 U/L — SIGNIFICANT CHANGE UP (ref 15–37)
BASOPHILS # BLD AUTO: 0.06 K/UL — SIGNIFICANT CHANGE UP (ref 0–0.2)
BASOPHILS NFR BLD AUTO: 0.9 % — SIGNIFICANT CHANGE UP (ref 0–2)
BILIRUB SERPL-MCNC: 0.7 MG/DL — SIGNIFICANT CHANGE UP (ref 0.2–1.2)
BILIRUB UR-MCNC: NEGATIVE — SIGNIFICANT CHANGE UP
BUN SERPL-MCNC: 11 MG/DL — SIGNIFICANT CHANGE UP (ref 7–23)
CALCIUM SERPL-MCNC: 9.3 MG/DL — SIGNIFICANT CHANGE UP (ref 8.5–10.5)
CHLORIDE SERPL-SCNC: 104 MMOL/L — SIGNIFICANT CHANGE UP (ref 96–108)
CO2 SERPL-SCNC: 32 MMOL/L — HIGH (ref 22–31)
COLOR SPEC: YELLOW — SIGNIFICANT CHANGE UP
CREAT SERPL-MCNC: 1.16 MG/DL — SIGNIFICANT CHANGE UP (ref 0.5–1.3)
DIFF PNL FLD: NEGATIVE — SIGNIFICANT CHANGE UP
EGFR: 56 ML/MIN/1.73M2 — LOW
EGFR: 56 ML/MIN/1.73M2 — LOW
EOSINOPHIL # BLD AUTO: 0.14 K/UL — SIGNIFICANT CHANGE UP (ref 0–0.5)
EOSINOPHIL NFR BLD AUTO: 2 % — SIGNIFICANT CHANGE UP (ref 0–6)
GLUCOSE SERPL-MCNC: 97 MG/DL — SIGNIFICANT CHANGE UP (ref 70–99)
GLUCOSE UR QL: NEGATIVE MG/DL — SIGNIFICANT CHANGE UP
HCT VFR BLD CALC: 44.4 % — SIGNIFICANT CHANGE UP (ref 34.5–45)
HGB BLD-MCNC: 14.2 G/DL — SIGNIFICANT CHANGE UP (ref 11.5–15.5)
IMM GRANULOCYTES # BLD AUTO: 0.01 K/UL — SIGNIFICANT CHANGE UP (ref 0–0.07)
IMM GRANULOCYTES NFR BLD AUTO: 0.1 % — SIGNIFICANT CHANGE UP (ref 0–0.9)
KETONES UR QL: NEGATIVE MG/DL — SIGNIFICANT CHANGE UP
LEUKOCYTE ESTERASE UR-ACNC: NEGATIVE — SIGNIFICANT CHANGE UP
LYMPHOCYTES # BLD AUTO: 2.29 K/UL — SIGNIFICANT CHANGE UP (ref 1–3.3)
LYMPHOCYTES NFR BLD AUTO: 32.5 % — SIGNIFICANT CHANGE UP (ref 13–44)
MAGNESIUM SERPL-MCNC: 2 MG/DL — SIGNIFICANT CHANGE UP (ref 1.6–2.6)
MCHC RBC-ENTMCNC: 29 PG — SIGNIFICANT CHANGE UP (ref 27–34)
MCHC RBC-ENTMCNC: 32 G/DL — SIGNIFICANT CHANGE UP (ref 32–36)
MCV RBC AUTO: 90.8 FL — SIGNIFICANT CHANGE UP (ref 80–100)
MONOCYTES # BLD AUTO: 0.5 K/UL — SIGNIFICANT CHANGE UP (ref 0–0.9)
MONOCYTES NFR BLD AUTO: 7.1 % — SIGNIFICANT CHANGE UP (ref 2–14)
NEUTROPHILS # BLD AUTO: 4.05 K/UL — SIGNIFICANT CHANGE UP (ref 1.8–7.4)
NEUTROPHILS NFR BLD AUTO: 57.4 % — SIGNIFICANT CHANGE UP (ref 43–77)
NITRITE UR-MCNC: NEGATIVE — SIGNIFICANT CHANGE UP
NRBC # BLD AUTO: 0 K/UL — SIGNIFICANT CHANGE UP (ref 0–0)
NRBC # FLD: 0 K/UL — SIGNIFICANT CHANGE UP (ref 0–0)
NRBC BLD AUTO-RTO: 0 /100 WBCS — SIGNIFICANT CHANGE UP (ref 0–0)
PH UR: 5.5 — SIGNIFICANT CHANGE UP (ref 5–8)
PLATELET # BLD AUTO: 292 K/UL — SIGNIFICANT CHANGE UP (ref 150–400)
PMV BLD: 9.5 FL — SIGNIFICANT CHANGE UP (ref 7–13)
POTASSIUM SERPL-MCNC: 3.8 MMOL/L — SIGNIFICANT CHANGE UP (ref 3.5–5.3)
POTASSIUM SERPL-SCNC: 3.8 MMOL/L — SIGNIFICANT CHANGE UP (ref 3.5–5.3)
PROT SERPL-MCNC: 7.8 G/DL — SIGNIFICANT CHANGE UP (ref 6.4–8.2)
PROT UR-MCNC: NEGATIVE MG/DL — SIGNIFICANT CHANGE UP
RBC # BLD: 4.89 M/UL — SIGNIFICANT CHANGE UP (ref 3.8–5.2)
RBC # FLD: 13.2 % — SIGNIFICANT CHANGE UP (ref 10.3–14.5)
SODIUM SERPL-SCNC: 143 MMOL/L — SIGNIFICANT CHANGE UP (ref 132–145)
SP GR SPEC: 1.01 — SIGNIFICANT CHANGE UP (ref 1–1.03)
UROBILINOGEN FLD QL: 1 MG/DL — SIGNIFICANT CHANGE UP (ref 0.2–1)
WBC # BLD: 7.05 K/UL — SIGNIFICANT CHANGE UP (ref 3.8–10.5)
WBC # FLD AUTO: 7.05 K/UL — SIGNIFICANT CHANGE UP (ref 3.8–10.5)

## 2025-07-10 PROCEDURE — 99285 EMERGENCY DEPT VISIT HI MDM: CPT

## 2025-07-10 RX ORDER — ACETAMINOPHEN 500 MG/5ML
1000 LIQUID (ML) ORAL ONCE
Refills: 0 | Status: COMPLETED | OUTPATIENT
Start: 2025-07-10 | End: 2025-07-10

## 2025-07-10 RX ORDER — METOCLOPRAMIDE HCL 10 MG
10 TABLET ORAL ONCE
Refills: 0 | Status: COMPLETED | OUTPATIENT
Start: 2025-07-10 | End: 2025-07-10

## 2025-07-10 RX ADMIN — Medication 1000 MILLILITER(S): at 21:31

## 2025-07-10 RX ADMIN — Medication 10 MILLIGRAM(S): at 21:31

## 2025-07-10 RX ADMIN — Medication 400 MILLIGRAM(S): at 21:31

## 2025-07-10 NOTE — ED ADULT NURSE NOTE - HIV OFFER
No outpatient medications have been marked as taking for the 3/8/24 encounter (Hospital Encounter).                       NPO Instructions: NO RESTRICTIONS. WIDE AWAKE BLOCK PROCEDURE        Additional Instructions:                                                      Opt out

## 2025-07-10 NOTE — ED PROVIDER NOTE - PATIENT PORTAL LINK FT
1 You can access the FollowMyHealth Patient Portal offered by [facility] by registering at the following website: [email or web address]. By joining [facility]’s FollowMyHealth portal, you will also be able to view your health information using other applications (apps) compatible with our system.

## 2025-07-10 NOTE — ED ADULT TRIAGE NOTE - AVIAN FLU SYMPTOMS
Patient stated Dr. Nathalie Day referred him to a doctor to receive an injection his back and he wanted to know did he have to go to [facility] to receive it? His appointment is today. Could you please call him at [phone]. No

## 2025-07-10 NOTE — ED PROVIDER NOTE - PROGRESS NOTE DETAILS
Prescription: Patient states that she is feeling better after the fluids.  Thinks that it may have been secondary to dehydration.  Imaging studies unremarkable.  Patient is comfortable going home.

## 2025-07-10 NOTE — ED PROVIDER NOTE - OBJECTIVE STATEMENT
55 yo F with PMHx of cerebellar stroke 4 yrs ago, followed by Dr. Alarcon at [facility], last seen 6 months ago, migraine HA, PFO s/p surgical repair, HTN, HLD, currently on ASA and lipitor, presenting c/o HA with aura. Pt reports migraine HA with her prior stroke, but hardly with aura. Noted some "prism me visions in both eyes, lasting for 20 mins and subsequently with a dull frontal b/l HA which has been there for 1-2 days now." Took her usual migraine pain meds with mild improvement. still with 4/10 HA. Denies HA, dizziness, numbness, tingling, photophobia, diplopia, change in hearing/gait/mental status/speech, focal weakness, neck pain, rash, fever, chills, stiffness, CP, SOB, palpitations, diaphoresis, N/V/D/C, abdominal pain, change in urinary/bowel function, incontinence, seizure like activities, dysuria, hematuria, flank pain, and malaise.

## 2025-07-10 NOTE — ED PROVIDER NOTE - PHYSICAL EXAMINATION
Vital Signs -triage vitals reviewed and confirmed  GENERAL: WDWN, No acute distress, speaking in full sentences, phonating well   HEAD:  Atraumatic, Normocephalic  EYES: EOMI, PERRL, conjunctiva and sclera clear, pupils 3mm b/l  ENMT: Oropharynx clear. No tonsillar erythema, exudates, or enlargement; Moist mucous membranes, TM intact b/l with no erythema, bleeding, or mastoid tenderness   NECK: Supple, FROM, No JVD, No carotid bruits, no step off, negative brudzinski's and kernig's signs  HEART: Regular rate and rhythm; No murmurs, rubs, or gallops  RESPIRATORY: Respiration non-labored, CTAB, No rhonchi, rales, or wheezing   GI: NABS, Soft, Nontender, Nondistended, No CVAT b/l   NEUROLOGY: A&Ox3, CN II-XII grossly intact, moving all extremities, ambulatory with steady gait, cerebellar function intact, no pronator drift or facial droops. Negative nystagmus, Strength intact and symmetric b/l, speech fluent, vision intact b/l, no diplopia   EXTREMITIES: W/W/P, no C/C/E, 2+palpable symmetric b/l pulses, comparments soft, +SILT, NV intact, FROM of peripheral joints, no midline tenderness or step off   SKIN: warm, dry, normal color, no acute rash or lesions   PSYCH - Cooperative, appropriate mood, language/behaviors

## 2025-07-10 NOTE — ED PROVIDER NOTE - ATTENDING APP SHARED VISIT CONTRIBUTION OF CARE
54-year-old female with a history of a cerebellar stroke four years ago, migraine headaches, patent foramen ovale repair, hypertension, and hyperlipidemia presented with a headache accompanied by aura. She described "prism-like" visual disturbances in both eyes lasting 20 minutes, followed by a dull bilateral frontal headache persisting for one to two days, which was somewhat relieved by her usual migraine medication. She reported no additional neurological symptoms or systemic complaints. The physical examination was largely unremarkable, with vital signs showing normal readings. Treatment included intravenous fluids and pain management, while diagnostic investigations were conducted to rule out other potential causes such as CVA, migraine, or dehydration. Detailed follow-up care was discussed to monitor headaches and associated symptoms.    I saw and evaluated the patient. I discussed the case with the MARQUES and agree with the findings and helped develop the plan of care as documented in the MARQUES's note. I agree with the findings and plan of care as documented in the MARQUES's note.

## 2025-07-10 NOTE — ED ADULT NURSE NOTE - OBJECTIVE STATEMENT
Pt states headache with aura since Tuesday with lightheadedness today. hx migraines    Pt ao3, in no acute distress, IV initiated by LPN, second attempt R AC 20g, labs sent, fluids infusing, reglan and tylenol IV given, awaiting response to meds and lab/UA results for dc.

## 2025-07-10 NOTE — ED PROVIDER NOTE - NSFOLLOWUPINSTRUCTIONS_ED_ALL_ED_FT
Phone call from pt- requests refill on pen needles for lantus solostar pen.  Refill sent to [facility] per protocol.  Last seen 6/12/22.  Next appointment scheduled 10/27/23.   General Headache   A headache is pain or discomfort felt around the head or neck area. The specific cause of a headache may not be found. There are many causes and types of headaches. A few common ones are:    Tension headaches.  Migraine headaches.  Cluster headaches.  Chronic daily headaches.    Follow these instructions at home:  Watch your condition for any changes. Take these steps to help with your condition:    Managing pain   Take over-the-counter and prescription medicines only as told by your health care provider.  Lie down in a dark, quiet room when you have a headache.    If directed, apply ice to the head and neck area:  Put ice in a plastic bag.  Place a towel between your skin and the bag.  Leave the ice on for 20 minutes, 2–3 times per day.    Use a heating pad or hot shower to apply heat to the head and neck area as told by your health care provider.  Keep lights dim if bright lights bother you or make your headaches worse.    Eating and drinking   Eat meals on a regular schedule.  Limit alcohol use.  Decrease the amount of caffeine you drink, or stop drinking caffeine.    General instructions   Keep all follow-up visits as told by your health care provider. This is important.  Keep a headache journal to help find out what may trigger your headaches. For example, write down:  What you eat and drink.  How much sleep you get.  Any change to your diet or medicines.    Try massage or other relaxation techniques.  Limit stress.  Sit up straight, and do not tense your muscles.  Do not use tobacco products, including cigarettes, chewing tobacco, or e-cigarettes. If you need help quitting, ask your health care provider.  Exercise regularly as told by your health care provider.  Sleep on a regular schedule. Get 7–9 hours of sleep, or the amount recommended by your health care provider.    Contact a health care provider if:  Your symptoms are not helped by medicine.  You have a headache that is different from the usual headache.  You have nausea or you vomit.  You have a fever.    Get help right away if:  Your headache becomes severe.  You have repeated vomiting.  You have a stiff neck.  You have a loss of vision.  You have problems with speech.  You have pain in the eye or ear.  You have muscular weakness or loss of muscle control.  You lose your balance or have trouble walking.  You feel faint or pass out.  You have confusion.    This information is not intended to replace advice given to you by your health care provider. Make sure you discuss any questions you have with your health care provider.

## 2025-07-10 NOTE — ED PROVIDER NOTE - CLINICAL SUMMARY MEDICAL DECISION MAKING FREE TEXT BOX
53 yo F with PMHx of cerebellar stroke 4 yrs ago, followed by Dr. Alarcon at [facility], last seen 6 months ago, migraine HA, PFO s/p surgical repair, HTN, HLD, currently on ASA and lipitor, presenting c/o HA with aura. Pt reports migraine HA with her prior stroke, but hardly with aura. Noted some "prism me visions in both eyes, lasting for 20 mins and subsequently with a dull frontal b/l HA which has been there for 1-2 days now."  DDx including but not limited to the following -   CVA, migraine HA, CRVO/CRAO, ICH, GCA, metabolic/electrolyte derangement, dehydration, hypoglycemia   - check medical labs, FS, orthostatic, CTH/CTA, IVF, pain control and reassess

## 2025-07-11 ENCOUNTER — RX RENEWAL (OUTPATIENT)
Age: 55
End: 2025-07-11

## 2025-07-11 PROCEDURE — 70498 CT ANGIOGRAPHY NECK: CPT | Mod: 26

## 2025-07-11 PROCEDURE — 70450 CT HEAD/BRAIN W/O DYE: CPT | Mod: 26,59

## 2025-07-11 PROCEDURE — 70496 CT ANGIOGRAPHY HEAD: CPT | Mod: 26

## 2025-07-14 DIAGNOSIS — Z88.2 ALLERGY STATUS TO SULFONAMIDES: ICD-10-CM

## 2025-07-14 DIAGNOSIS — Z86.73 PERSONAL HISTORY OF TRANSIENT ISCHEMIC ATTACK (TIA), AND CEREBRAL INFARCTION WITHOUT RESIDUAL DEFICITS: ICD-10-CM

## 2025-07-14 DIAGNOSIS — Z79.82 LONG TERM (CURRENT) USE OF ASPIRIN: ICD-10-CM

## 2025-07-14 DIAGNOSIS — R51.9 HEADACHE, UNSPECIFIED: ICD-10-CM

## 2025-07-14 DIAGNOSIS — E78.5 HYPERLIPIDEMIA, UNSPECIFIED: ICD-10-CM

## 2025-07-14 DIAGNOSIS — Z86.69 PERSONAL HISTORY OF OTHER DISEASES OF THE NERVOUS SYSTEM AND SENSE ORGANS: ICD-10-CM

## 2025-07-14 DIAGNOSIS — I10 ESSENTIAL (PRIMARY) HYPERTENSION: ICD-10-CM

## 2025-07-17 ENCOUNTER — TRANSCRIPTION ENCOUNTER (OUTPATIENT)
Age: 55
End: 2025-07-17

## 2025-07-18 ENCOUNTER — APPOINTMENT (OUTPATIENT)
Dept: NEUROLOGY | Facility: CLINIC | Age: 55
End: 2025-07-18
Payer: MEDICAID

## 2025-07-18 PROCEDURE — 99213 OFFICE O/P EST LOW 20 MIN: CPT | Mod: 95

## 2025-07-19 ENCOUNTER — EMERGENCY (EMERGENCY)
Facility: HOSPITAL | Age: 55
LOS: 1 days | End: 2025-07-19
Attending: STUDENT IN AN ORGANIZED HEALTH CARE EDUCATION/TRAINING PROGRAM | Admitting: STUDENT IN AN ORGANIZED HEALTH CARE EDUCATION/TRAINING PROGRAM
Payer: MEDICAID

## 2025-07-19 VITALS
HEART RATE: 91 BPM | RESPIRATION RATE: 16 BRPM | DIASTOLIC BLOOD PRESSURE: 91 MMHG | WEIGHT: 136.91 LBS | OXYGEN SATURATION: 98 % | TEMPERATURE: 98 F | HEIGHT: 65 IN | SYSTOLIC BLOOD PRESSURE: 132 MMHG

## 2025-07-19 VITALS
HEART RATE: 62 BPM | OXYGEN SATURATION: 100 % | RESPIRATION RATE: 16 BRPM | TEMPERATURE: 99 F | DIASTOLIC BLOOD PRESSURE: 87 MMHG | SYSTOLIC BLOOD PRESSURE: 137 MMHG

## 2025-07-19 DIAGNOSIS — Q21.1 ATRIAL SEPTAL DEFECT: Chronic | ICD-10-CM

## 2025-07-19 LAB
ADD ON TEST-SPECIMEN IN LAB: SIGNIFICANT CHANGE UP
ANION GAP SERPL CALC-SCNC: 13 MMOL/L — SIGNIFICANT CHANGE UP (ref 5–17)
APPEARANCE UR: CLEAR — SIGNIFICANT CHANGE UP
BASOPHILS # BLD AUTO: 0.07 K/UL — SIGNIFICANT CHANGE UP (ref 0–0.2)
BASOPHILS NFR BLD AUTO: 1.2 % — SIGNIFICANT CHANGE UP (ref 0–2)
BILIRUB UR-MCNC: NEGATIVE — SIGNIFICANT CHANGE UP
BUN SERPL-MCNC: 16 MG/DL — SIGNIFICANT CHANGE UP (ref 7–23)
CALCIUM SERPL-MCNC: 10 MG/DL — SIGNIFICANT CHANGE UP (ref 8.4–10.5)
CHLORIDE SERPL-SCNC: 103 MMOL/L — SIGNIFICANT CHANGE UP (ref 96–108)
CO2 SERPL-SCNC: 28 MMOL/L — SIGNIFICANT CHANGE UP (ref 22–31)
COLOR SPEC: YELLOW — SIGNIFICANT CHANGE UP
CREAT SERPL-MCNC: 1.07 MG/DL — SIGNIFICANT CHANGE UP (ref 0.5–1.3)
D DIMER BLD IA.RAPID-MCNC: <150 NG/ML DDU — SIGNIFICANT CHANGE UP
DIFF PNL FLD: NEGATIVE — SIGNIFICANT CHANGE UP
EGFR: 62 ML/MIN/1.73M2 — SIGNIFICANT CHANGE UP
EGFR: 62 ML/MIN/1.73M2 — SIGNIFICANT CHANGE UP
EOSINOPHIL # BLD AUTO: 0.08 K/UL — SIGNIFICANT CHANGE UP (ref 0–0.5)
EOSINOPHIL NFR BLD AUTO: 1.3 % — SIGNIFICANT CHANGE UP (ref 0–6)
GLUCOSE SERPL-MCNC: 99 MG/DL — SIGNIFICANT CHANGE UP (ref 70–99)
GLUCOSE UR QL: NEGATIVE MG/DL — SIGNIFICANT CHANGE UP
HCT VFR BLD CALC: 43.2 % — SIGNIFICANT CHANGE UP (ref 34.5–45)
HGB BLD-MCNC: 14.3 G/DL — SIGNIFICANT CHANGE UP (ref 11.5–15.5)
IMM GRANULOCYTES # BLD AUTO: 0.01 K/UL — SIGNIFICANT CHANGE UP (ref 0–0.07)
IMM GRANULOCYTES NFR BLD AUTO: 0.2 % — SIGNIFICANT CHANGE UP (ref 0–0.9)
KETONES UR QL: ABNORMAL MG/DL
LEUKOCYTE ESTERASE UR-ACNC: ABNORMAL
LYMPHOCYTES # BLD AUTO: 1.9 K/UL — SIGNIFICANT CHANGE UP (ref 1–3.3)
LYMPHOCYTES NFR BLD AUTO: 31.8 % — SIGNIFICANT CHANGE UP (ref 13–44)
MAGNESIUM SERPL-MCNC: 2.2 MG/DL — SIGNIFICANT CHANGE UP (ref 1.6–2.6)
MCHC RBC-ENTMCNC: 30 PG — SIGNIFICANT CHANGE UP (ref 27–34)
MCHC RBC-ENTMCNC: 33.1 G/DL — SIGNIFICANT CHANGE UP (ref 32–36)
MCV RBC AUTO: 90.6 FL — SIGNIFICANT CHANGE UP (ref 80–100)
MONOCYTES # BLD AUTO: 0.39 K/UL — SIGNIFICANT CHANGE UP (ref 0–0.9)
MONOCYTES NFR BLD AUTO: 6.5 % — SIGNIFICANT CHANGE UP (ref 2–14)
NEUTROPHILS # BLD AUTO: 3.52 K/UL — SIGNIFICANT CHANGE UP (ref 1.8–7.4)
NEUTROPHILS NFR BLD AUTO: 59 % — SIGNIFICANT CHANGE UP (ref 43–77)
NITRITE UR-MCNC: NEGATIVE — SIGNIFICANT CHANGE UP
NRBC # BLD AUTO: 0 K/UL — SIGNIFICANT CHANGE UP (ref 0–0)
NRBC # FLD: 0 K/UL — SIGNIFICANT CHANGE UP (ref 0–0)
NRBC BLD AUTO-RTO: 0 /100 WBCS — SIGNIFICANT CHANGE UP (ref 0–0)
PH UR: 6 — SIGNIFICANT CHANGE UP (ref 5–8)
PLATELET # BLD AUTO: 289 K/UL — SIGNIFICANT CHANGE UP (ref 150–400)
PMV BLD: 9.4 FL — SIGNIFICANT CHANGE UP (ref 7–13)
POTASSIUM SERPL-MCNC: 4.9 MMOL/L — SIGNIFICANT CHANGE UP (ref 3.5–5.3)
POTASSIUM SERPL-SCNC: 4.9 MMOL/L — SIGNIFICANT CHANGE UP (ref 3.5–5.3)
PROT UR-MCNC: NEGATIVE MG/DL — SIGNIFICANT CHANGE UP
RBC # BLD: 4.77 M/UL — SIGNIFICANT CHANGE UP (ref 3.8–5.2)
RBC # FLD: 13.1 % — SIGNIFICANT CHANGE UP (ref 10.3–14.5)
SODIUM SERPL-SCNC: 144 MMOL/L — SIGNIFICANT CHANGE UP (ref 135–145)
SP GR SPEC: 1.02 — SIGNIFICANT CHANGE UP (ref 1–1.03)
TROPONIN T, HIGH SENSITIVITY RESULT: <6 NG/L — SIGNIFICANT CHANGE UP (ref 0–51)
TROPONIN T, HIGH SENSITIVITY RESULT: <6 NG/L — SIGNIFICANT CHANGE UP (ref 0–51)
UROBILINOGEN FLD QL: 1 MG/DL — SIGNIFICANT CHANGE UP (ref 0.2–1)
WBC # BLD: 5.97 K/UL — SIGNIFICANT CHANGE UP (ref 3.8–10.5)
WBC # FLD AUTO: 5.97 K/UL — SIGNIFICANT CHANGE UP (ref 3.8–10.5)

## 2025-07-19 PROCEDURE — 71046 X-RAY EXAM CHEST 2 VIEWS: CPT | Mod: 26

## 2025-07-19 PROCEDURE — 71046 X-RAY EXAM CHEST 2 VIEWS: CPT

## 2025-07-19 PROCEDURE — 81001 URINALYSIS AUTO W/SCOPE: CPT

## 2025-07-19 PROCEDURE — 36415 COLL VENOUS BLD VENIPUNCTURE: CPT

## 2025-07-19 PROCEDURE — 80048 BASIC METABOLIC PNL TOTAL CA: CPT

## 2025-07-19 PROCEDURE — 83735 ASSAY OF MAGNESIUM: CPT

## 2025-07-19 PROCEDURE — 99283 EMERGENCY DEPT VISIT LOW MDM: CPT | Mod: 25

## 2025-07-19 PROCEDURE — 99284 EMERGENCY DEPT VISIT MOD MDM: CPT

## 2025-07-19 PROCEDURE — 85379 FIBRIN DEGRADATION QUANT: CPT

## 2025-07-19 PROCEDURE — 85025 COMPLETE CBC W/AUTO DIFF WBC: CPT

## 2025-07-19 PROCEDURE — 84484 ASSAY OF TROPONIN QUANT: CPT

## 2025-07-19 RX ADMIN — Medication 1000 MILLILITER(S): at 21:31

## 2025-07-19 NOTE — ED PROVIDER NOTE - NSFOLLOWUPINSTRUCTIONS_ED_ALL_ED_FT
Thank you for visiting [facility] Emergency Department.      Please know that no emergency visit is complete without follow-up with your primary care provider within 1 week.  Please bring copies of all discharge papers and results and show to your doctor.    Please continue taking all previous medications as instructed   I appreciated your patience and hope you feel better soon.       Chest Pain    WHAT YOU NEED TO KNOW:    Chest pain can be caused by a range of conditions, from not serious to life-threatening. Chest pain can be a symptom of a digestive problem, such as acid reflux or a stomach ulcer. An anxiety attack or a strong emotion, such as anger, can also cause chest pain. Infection, inflammation, or a fracture in the bones or cartilage in your chest can cause pain or discomfort. Sometimes chest pain or pressure is caused by poor blood flow to your heart (angina). Chest pain may also be caused by life-threatening conditions such as a heart attack or blood clot in your lungs.    DISCHARGE INSTRUCTIONS:    Call your local emergency number (911 in the [place]) or have someone call if:    You have any of the following signs of a heart attack:  Squeezing, pressure, or pain in your chest    You may also have any of the following:  Discomfort or pain in your back, neck, jaw, stomach, or arm    Shortness of breath    Nausea or vomiting    Lightheadedness or a sudden cold sweat    Return to the emergency department if:    You have chest discomfort that gets worse, even with medicine.    You cough or vomit blood.    Your bowel movements are black or bloody.    You cannot stop vomiting, or it hurts to swallow.  Call your doctor if:    You have questions or concerns about your condition or care.    Medicines:    Medicines may be given to treat the cause of your chest pain. Examples include pain medicine, anxiety medicine, or medicines to increase blood flow to your heart.    Do not take certain medicines without asking your healthcare provider first. These include NSAIDs, herbal or vitamin supplements, and hormones, such as estrogen or progestin.    Take your medicine as directed. Contact your healthcare provider if you think your medicine is not helping or if you have side effects. Tell your provider if you are allergic to any medicine. Keep a list of the medicines, vitamins, and herbs you take. Include the amounts, and when and why you take them. Bring the list or the pill bottles to follow-up visits. Carry your medicine list with you in case of an emergency.  Healthy living tips: If the cause of your chest pain is known, your healthcare provider will give you specific guidelines to follow. The following are general healthy guidelines:    Do not smoke. Nicotine and other chemicals in cigarettes and cigars can cause lung and heart damage. Ask your provider for information if you currently smoke and need help to quit. E-cigarettes or smokeless tobacco still contain nicotine. Talk to your provider before you use these products.    Choose a variety of healthy foods as often as possible. Include fresh, frozen, or canned fruits and vegetables. Also include low-fat dairy products, fish, chicken (without skin), and lean meats. Your provider or a dietitian can help you create meal plans. You may need to avoid certain foods or drinks if your pain is caused by a digestion problem.  Healthy Foods      Lower your sodium (salt) intake. Limit foods that are high in sodium, such as canned foods, salty snacks, and cold cuts. If you add salt when you cook food, do not add more at the table. Choose low-sodium canned foods as much as possible.        Drink plenty of water every day. Water helps your body to control your temperature and blood pressure. Ask your provider how much water you should drink every day.    Ask about activity. Your provider will tell you which activities to limit or avoid. Ask when you can drive, return to work, and have sex. Ask about the best exercise plan for you.    Maintain a healthy weight. Ask your provider what a healthy weight is for you. Ask your provider to help you create a safe weight loss plan if you are overweight.

## 2025-07-19 NOTE — ED ADULT NURSE NOTE - OBJECTIVE STATEMENT
Pt presented to ED c/o chest discomfort/tightness this evening. Hx of CVA 4 years ago. A&Ox3, in NAD, breathing unlabored comfortably on RA. Denies fever, chills, SOB, chest pain, n/v/d, abd pain, diaphoresis at this time Pt presented to ED c/o lightheadedness, SOB and intermittent sharp brief chest pain that pt noticed after she had a walk outside. Pt mentioned she was feeling extremely tired since morning, noticed that she was sweating a lot while walking.  Hx of CVA 4 years ago. A&Ox3, in NAD, breathing unlabored comfortably on RA. Denies fever, chills, SOB, chest pain, n/v/d, abd pain, diaphoresis at this time

## 2025-07-19 NOTE — ED PROVIDER NOTE - PROGRESS NOTE DETAILS
pt remains hemodynamically stable and symptoms free while in the ER. 2 trops done- negative, labs- no anemia, no electrolyte derangement, CXR- wnl. pt reassured. d/c home stable. out pt f/u recommended.

## 2025-07-19 NOTE — ED PROVIDER NOTE - OBJECTIVE STATEMENT
53 yo female  with PMHx of cerebellar stroke 4 yrs ago, followed by Dr. Alarcon at [facility], last seen 6 months ago, migraine HA, PFO s/p surgical repair, HTN, HLD, currently on ASA and lipitor, presenting c/o lightheadedness, SOB and intermittent sharp brief chest pain that pt noticed after she had a walk outside. Pt mentioned she was feeling extremely tired since morning, noticed that she was sweating a lot while walking. pt states she decided to come to ER for evaluation before going home after walk. while in the ER pt is feeling better. currently no chest pain.

## 2025-07-19 NOTE — ED PROVIDER NOTE - ATTENDING APP SHARED VISIT CONTRIBUTION OF CARE
i discussed the care of the pt directly with the ACP while the pt was in the ED. i have reviewed the ACP note and agree w/ the history, exam and plan of care other than as noted above.    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

## 2025-07-19 NOTE — ED ADULT NURSE NOTE - CHIEF COMPLAINT
Section Operative Note    Date of surgery:  2024    Surgeon:  Dr. Mayte Wright DO, OB/GYN    Assistant:  Dr. Heidi Reed, PGY2  And medical student Gi    PreOp Dx:     1.  27 yo  at 38.0 wks    2.  gHTN   3.  Severe obesity   4.  Hypothyroidism   5.  Malpresentation-breech   6.  Prior c/s x 2, not candidate for TOLAC and declines    PostOp Dx:  Same + complete breech    Procedure:  Repeat Low Transverse  Section     Anesthesia: Spinal anesthesia    Findings: Viable male infant.  Normal-appearing uterus, ovaries, and fallopian tubes bilaterally.  Minimal adhesions/scar tissue.      Weight:  9#2oz    Apgars: 8 and 9 at 1 and 5 minutes, respectively.    Complications:  None    Disposition:  Mother will be transferred to recovery.  RN is present to assess the .      Indications for the procedure:  Pt presented to hospital with c/o changes in vision with blurriness and elevated BP at home to 150s/80s.  Pt was instructed to go to hospital if she had any changes in BP or signs of PIH/Pre-E.  On presentation and given office BP of 140/80s, she met criteria for gHTN.  Pt was counseled and followed recommendation for delivery given this new diagnosis.  Pt was was prior c/s and desires repeat, non-candidate for TOLAC.      Surgical risks: The patient was informed of the risks, indications, alternatives, and benefits of the procedure.  Risks included, but were not limited to, bleeding, infection, damage to the surrounding organs including the vagina, cervix, uterus, tubes, ovaries, bowel, ureters, urethra, and bladder.  Additionally major vascular injury was discussed.  The possibility of  hysterectomy and death were also discussed.  The patient was also counseled on the risks of fetal injuries including lacerations, bruising,  jaundice, complications of shoulder dystocia, and fetal death.  The patient expressed understanding of the risks involved, all questions were  answered and the patient consented to the procedure.  Pt accepts blood transfusion if indicated.        Procedure:   After consent was obtained the patient was taken to the operating room where a timeout was performed to confirm correct patient and correct procedure.  Spinal anesthesia was adequately established and prophylactic intravenous antibiotics were administered.  The patient was then placed in the dorsal supine position with a left tilt of the hips.  Pressure points were padded and a bear hugger was placed to maintain core body temperature.  The patient was then prepped and draped in usual sterile fashion.  Anesthesia was tested with Allis clamps and found to be adequate.  A transverse skin incision was made approximately 2 fingerbreadths above the pubic pubic symphysis with a sharp scalpel blade.  Sharp dissection was carried out over subsequent layers of tissue including the fascia.  The fascia was then incised.  Using pickups and Correa scissors the fascia was lifted off of the underlying rectus and opened laterally.  Next the superior aspect of the fascia was grasped with Kocher clamps elevated tented up and the underlying rectus muscle was dissected off bluntly and at midline with Correa scissors.  Next in a similar fashion the inferior aspect of the fascia was grasped with Kocher clamps, elevated, tented up, and the underlying rectus muscle was dissected off bluntly in the midline with Correa scissors.  Next the rectus muscle was slightly  at midline and the peritoneum was identified and entered with the assist of blunt and sharp dissection.  Peritoneal stretch was then applied.  Next the vesico-uterine peritoneum was identified.  Bladder blade was inserted.  Decision made to place nora retractor for better visualization.  The vesicouterine peritoneum was gently lifted with pickup and entered sharply with Metzenbaum scissors.  The bladder flap was subsequently developed with Metzenbaum scissors  in a lateral type fashion.  Next the bladder flap was further developed by blunt dissection.  The bladder blade was then reinserted over the bladder to protect the underlying parts.  Hysterotomy was then made with sharp scalpel blade.  The hysterotomy was widened by blunt and sharp dissection.  Amniotic fluid was noted clear at this point.  Next the fetal hips were elevated through the incision.  Next the legs were delivered by flexion, followed by the arms and the head easily followed.  Mouth and nose were bulb suctioned.  Next the three-vessel cord was clamped and cut x2.  The infant was then handed off to waiting pediatric team for further assessment. Cord gases were obtained if indicated.  Next the placenta was delivered by manual extraction.  The placenta was sent to pathology for further assessment.  The interior of the uterus was cleared of all clot and debris with a wet lap sponge.  The apices of the hysterotomy were then identified and grasped with Allis clamps.  Next the lower uterine segment was identified and grasped with an Allis clamp.  Next the uterus was closed with 0 Vicryl in a running type fashion.  Surgical snow was placed for additional hemostasis.  The gutters were cleared of all clots and debris.  Hemostasis was again verified.  Attention was then turned to closure of the fascia which was achieved with 0 Vicryl in a running type fashion using two stiches to meet in midline.  With excellent approximation noted, electrocautery was then used to achieve hemostasis at Campers and Gail's layers.  This layer was then reapproximated with plain gut.  Next the skin was closed with 4-0 Monocryl, undyed in a running subcuticular type fashion.  Excellent approximation and hemostasis was noted. Island dressing applied to cover the wound.    The patient tolerated the procedure well, and she was taken to recovery in stable condition.      At the time of delivery the infant was noted to be moving all  extremities appropriately, no bruising was noted on the head.  And there was no evidence of nerve paralysis.      All sponge, lap, and needle counts were correct x3 at the end of the procedure.      QBL:  430cc    IVFs:  1.5L    UOP:  150cc    Mayte Wright DO  [facility]  [facility]  (O) [phone]     The patient is a 54y Female complaining of chest pain.

## 2025-07-19 NOTE — ED ADULT TRIAGE NOTE - CHIEF COMPLAINT QUOTE
"I have been having shortness of breath and chest pains on and off for about 1 week and I was here last time for a stroke 4 years ago and it was because of a hole in my heart that was fixed but I am concerned".

## 2025-07-19 NOTE — ED ADULT NURSE NOTE - ED CARDIAC CAPILLARY REFILL
Subjective    Chief Complaint  This information was obtained from the patient  The patient was seen today for follow up and management of non-healing left lower leg wound. 12/28 No additional concerns for today's visit.     Allergies  Cymbalta (Reaction: it 8-14-17:  Patient was admitted to the hospital for UTI, fever and sacral pressure wound/diarrhea. Patient diagnosed with Clostridium difficile and treated with antibiotics.    Patient currently resides at [facility] and transported to  Musculoskeletal: Decreased Activity (wheelchair/ transfers with sliding board), Other (Hx: paraplegia ), Muscle Wasting  Integumentary (Hair/Skin/Nails): Dryness, Calluses/Corns, Prone to Skin Tears  Neurological: Abnormal Gait (w/chair bound), Paralysis ( Height/Length: 64 in (162.56 cm), Weight: 135 lbs (61.36 kgs), BMI: 23.2, Temperature: 96 °F (35.56 °C), Pulse: 73 bpm, Respiratory Rate: 18 breaths/min, Blood Pressure: 94/55 mmHg, Pulse Oximetry: 96 %.     Physical Exam  Constitutional  well developed, no Clean wound with Normal Saline or Wound Cleanser. Clean wound with soap and water. May shower with protection.   Acticoat - today at [facility], Home health continue with hydrofera ready  Hydrofera ready - zinc oxide to the periwound  Change dressing two times Patient Name: Ned Herron   Patient Number: [id number]  Patient YOB: 1943  Date: 12/28/2020  Physician / Nathen Leroy: Saud Campbell FNP-BC  Procedure Performed for: Wound #16 Left, Medial Lower Leg  Performed By: Physician Saud Campbell, FN 2 seconds or less

## 2025-07-19 NOTE — ED PROVIDER NOTE - CLINICAL SUMMARY MEDICAL DECISION MAKING FREE TEXT BOX
53 yo female  with PMHx of cerebellar stroke 4 yrs ago, followed by Dr. Alarcon at [facility], last seen 6 months ago, migraine HA, PFO s/p surgical repair, HTN, HLD, currently on ASA and lipitor, presenting c/o lightheadedness, SOB and intermittent sharp brief chest pain that pt noticed after she had a walk outside. Pt mentioned she was feeling extremely tired since morning, noticed that she was sweating a lot while walking. pt states she decided to come to ER for evaluation before going home after walk. while in the ER pt is feeling better. currently no chest pain.  pt is well appearing, VSS, ecg- NSR, 78, no acute ischemic changes. lungs- CTA b/l, CXR- no pneumonia, no pneumothorax, no cardiomegaly, no wide mediastinum. pending labs. unlikely acs, pe, dissection, pneumothorax.

## 2025-07-19 NOTE — ED PROVIDER NOTE - PATIENT PORTAL LINK FT
You can access the FollowMyHealth Patient Portal offered by [facility] by registering at the following website: [email or web address]. By joining [facility]’s FollowMyHealth portal, you will also be able to view your health information using other applications (apps) compatible with our system.

## 2025-07-21 ENCOUNTER — APPOINTMENT (OUTPATIENT)
Dept: HEART AND VASCULAR | Facility: CLINIC | Age: 55
End: 2025-07-21

## 2025-07-21 NOTE — DISCHARGE NOTE PROVIDER - HOSPITAL COURSE
"    PATIENT:               SAGE COLE  ACCT #:                  [id number]  MRN:                       [id number]  :                       1954  ADMIT DATE:       2025 8:55 PM  DISCH DATE:        2025 6:10 PM  RESPONDING PROVIDER #:        [id number]          PROVIDER RESPONSE TEXT:    I agree with dietician diagnosis of Severe Malnutrition on 7/15/25    CDI QUERY TEXT:    Clarification        Instruction:    Based on your assessment of the patient and the clinical information, please provide the requested documentation by clicking on the appropriate radio button and enter any additional information if prompted.    Question: Please further clarify this patient nutritional status as    When answering this query, please exercise your independent professional judgment. The fact that a question is being asked, does not imply that any particular answer is desired or expected.    The patient's clinical indicators include:  Clinical Information: 72 yo F with poor PO intake and Nutrition Consult.    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  As Evidenced by: 13% weight loss x 3 months, mild-moderate fat losses and moderate-severe muscle wasting, PO intake likely meeting <75% of EER x >/=1 motnh  Additional Assessment Information: Do believe that severe malnutrition is present at this time considering pt's report of PO intake of limited solids/focusing on liquids and previous weight loss of 13% from  - .\"      Treatment:  -Nutrition Consult  -Continue regular diet  -[place] Breakfast Essentials BID    Risk Factors:  -poor PO intake  -Leiomyosarcoma on chemo  -bacteremia  -FTT  Options provided:  -- I agree with dietician diagnosis of Severe Malnutrition on " 7/15/25  -- Other - I will add my own diagnosis  -- Refer to Clinical Documentation Reviewer    Query created by: Lotus Valdivia on 7/17/2025 3:08 PM      Electronically signed by:  YOLANDA BULLARD 7/21/2025 7:32 AM           Hospital course:  51y Female with PMHx of vertigo initially presented to [facility] for dizziness, right hand dysmetria and right leg weakness, LKW 11/12 10:30AM. At [facility], CTH without hemorrhage. CTA with no steno-occlusive disease. CTP with prolonged Tmax in the right temporal lobe and right cerebellar lobe thought to be artifact per report. Patient out of tpa window. She was transferred to [facility] for stroke work up. MRI revealed acute right superior cerebellar and acute left occipital horn CVA, MRA head and neck negative for dissection.       During this hospital course, patient had a ischemic stroke located in the right superior cerebellar and left occipital horn as seen on MRI.    The stroke etiology is likely secondary to:  []atrial fibrillation  []small vessel disease from atherosclerotic risk factors  []other:  [X]etiology workup still in progress    Patient had the following workup done in house:  CT Head:   No acute intracranial findings.  MRI BRAIN: Evolving acute infarct in the right brachium pontis and right superior cerebellar hemisphere. Evolving small acute infarct in the right occipital lobe.    MRA NECK: Novessel narrowing or occlusion in the neck.    CT PERFUSION: Suggestion of prolonged Tmax in the right temporal lobe and adjacent right cerebellar hemisphere, favored to be artifactual in nature. (Tmax >6.0s volume: 16 mL)    CT ANGIOGRAPHY NECK: No vessel narrowing or occlusion in the neck.    CT ANGIOGRAPHY HEAD: No evidence of vascular stenosis, occlusion, aneurysm or vascular malformation.      Physical exam at discharge:    New medications on discharge:  Labs to be followed up:  Imaging to be done as outpatient:  Further outpatient workup:   Hospital course:  5The patient is a very pleasant 51-year-old female admitted 11/12 after presenting with sudden onset vertigo, right-sided dysmetria, and dysarthria. LKW 1030 AM 11/12. CTH negative. CTA showed hypoplastic R vertebral artery. She was not a tpa or endovascular candidate. She was transferred to [facility] for further evaluation. MRI confirmed an acute right superior cerebellum/peduncle and left occipital stroke.  MRA again showed a hypoplastic right vertebral artery but no dissection and no occlusive disease otherwise.  Given unclear mechanism of stroke, hypercoagulable panel was sent, majority of which was pending at the time of discharge. TTE/LITO were unremarkable apart from PFO. DVT US *** . She underwent ILR placement prior to discharge with plan for close follow-up and consideration of PFO closure if workup otherwise was unremarkable.       During this hospital course, patient had a ischemic stroke located in the right superior cerebellar and left occipital horn as seen on MRI.    The stroke etiology is likely secondary to:  []atrial fibrillation  []small vessel disease from atherosclerotic risk factors  []other:  [X]etiology workup still in progress    Patient had the following workup done in house:  CT Head:   No acute intracranial findings.  MRI BRAIN: Evolving acute infarct in the right brachium pontis and right superior cerebellar hemisphere. Evolving small acute infarct in the right occipital lobe.    MRA NECK: Novessel narrowing or occlusion in the neck.    CT PERFUSION: Suggestion of prolonged Tmax in the right temporal lobe and adjacent right cerebellar hemisphere, favored to be artifactual in nature. (Tmax >6.0s volume: 16 mL)    CT ANGIOGRAPHY NECK: No vessel narrowing or occlusion in the neck.    CT ANGIOGRAPHY HEAD: No evidence of vascular stenosis, occlusion, aneurysm or vascular malformation.      Physical exam at discharge:    New medications on discharge: aspirin 81 mg daily, Plavix 75 mg daily. Atorvastatin 40 mg daily.   Labs to be followed up: hypercoag studies  Imaging to be done as outpatient:   Further outpatient workup:    Hospital course:  The patient is a very pleasant 51-year-old female admitted 11/12 after presenting with sudden onset vertigo, right-sided dysmetria, and dysarthria. LKW 1030 AM 11/12. CTH negative. CTA showed hypoplastic R vertebral artery. She was not a tpa or endovascular candidate. She was transferred to [facility] for further evaluation. MRI confirmed an acute right superior cerebellum/peduncle and left occipital stroke. MRA again showed a hypoplastic right vertebral artery but no dissection and no occlusive disease otherwise.  Given unclear mechanism of stroke, hypercoagulable panel was sent, majority of which was pending at the time of discharge. TTE/LITO were unremarkable apart from PFO. DVT US pending read. She underwent ILR placement prior to discharge with plan for close follow-up and consideration of PFO closure if workup otherwise was unremarkable. Hypercoagulable panel sent in the setting of young age and unclear etiology for stroke- majority of results have returned negative / unremarkable. Can f/u the remaining results at her scheduled outpatient appointment. Can consider PFO closure in the future if advised. Follow-up appointment made with [facility] Neurology service for 12/1/21 at 11:20 AM.        During this hospital course, patient had a ischemic stroke located in the right superior cerebellar and left occipital horn as seen on MRI.    The stroke etiology is likely secondary to:  []atrial fibrillation  []small vessel disease from atherosclerotic risk factors  []other:  [X]etiology workup still in progress    Patient had the following workup done in house:  CT Head:   No acute intracranial findings.  MRI BRAIN: Evolving acute infarct in the right brachium pontis and right superior cerebellar hemisphere. Evolving small acute infarct in the right occipital lobe.    MRA NECK: No vessel narrowing or occlusion in the neck.    CT PERFUSION: Suggestion of prolonged Tmax in the right temporal lobe and adjacent right cerebellar hemisphere, favored to be artifactual in nature. (Tmax >6.0s volume: 16 mL)    CT ANGIOGRAPHY NECK: No vessel narrowing or occlusion in the neck.    CT ANGIOGRAPHY HEAD: No evidence of vascular stenosis, occlusion, aneurysm or vascular malformation.      Physical exam at discharge:  Physical exam:  General: No acute distress, awake and alert  Eyes: Anicteric sclerae, moist conjunctivae, see below for CNs  Neck: trachea midline, FROM, supple, no thyromegaly or lymphadenopathy  Cardiovascular: Regular rate and rhythm, no murmurs, rubs, or gallops. No carotid bruits.   Pulmonary: Anterior breath sounds clear bilaterally, no crackles or wheezing. No use of accessory muscles  GI: Abdomen soft, non-distended, non-tender  Extremities: Radial and DP pulses +2, no edema    Neurologic:  -Mental status: Awake, alert, oriented to person, place, and time. Speech is fluent with intact naming, repetition, and comprehension, +mild dysarthria. Recent and remote memory intact. Follows commands. Attention/concentration intact. Fund of knowledge appropriate.  -Cranial nerves:   II: Visual fields are full to confrontation.  III, IV, VI: Extraocular movements are intact without nystagmus. Pupils equally round and reactive to light  V:  Facial sensation V1-V3 equal and intact   VII: Face is symmetric   Motor: Normal bulk and tone. No pronator drift. Strength bilateral upper extremity 5/5, bilateral lower extremities 5/5.  Rapid alternating movements intact and symmetric  Sensation: Intact to light touch bilaterally. No neglect or extinction on double simultaneous testing.  Coordination: +dysmetria on right finger-to-nose and slight dysmetria of right heel-to-shin. No dysmetria of left side    New medications on discharge: aspirin 81 mg daily, Plavix 75 mg daily. Atorvastatin 40 mg daily.   Labs to be followed up: hypercoag studies    Further outpatient workup: Loop interrogation

## 2025-07-22 DIAGNOSIS — R42 DIZZINESS AND GIDDINESS: ICD-10-CM

## 2025-07-22 DIAGNOSIS — Z88.2 ALLERGY STATUS TO SULFONAMIDES: ICD-10-CM

## 2025-07-22 DIAGNOSIS — R07.89 OTHER CHEST PAIN: ICD-10-CM

## 2025-07-22 DIAGNOSIS — E78.5 HYPERLIPIDEMIA, UNSPECIFIED: ICD-10-CM

## 2025-07-22 DIAGNOSIS — I10 ESSENTIAL (PRIMARY) HYPERTENSION: ICD-10-CM

## 2025-07-22 DIAGNOSIS — Z86.73 PERSONAL HISTORY OF TRANSIENT ISCHEMIC ATTACK (TIA), AND CEREBRAL INFARCTION WITHOUT RESIDUAL DEFICITS: ICD-10-CM

## 2025-07-22 DIAGNOSIS — R06.02 SHORTNESS OF BREATH: ICD-10-CM

## 2025-07-22 DIAGNOSIS — Z79.82 LONG TERM (CURRENT) USE OF ASPIRIN: ICD-10-CM

## 2025-07-28 ENCOUNTER — APPOINTMENT (OUTPATIENT)
Dept: OPHTHALMOLOGY | Facility: CLINIC | Age: 55
End: 2025-07-28

## 2025-07-30 ENCOUNTER — EMERGENCY (EMERGENCY)
Age: 55
LOS: 1 days | End: 2025-07-30
Attending: STUDENT IN AN ORGANIZED HEALTH CARE EDUCATION/TRAINING PROGRAM | Admitting: STUDENT IN AN ORGANIZED HEALTH CARE EDUCATION/TRAINING PROGRAM
Payer: MEDICAID

## 2025-07-30 ENCOUNTER — OUTPATIENT (OUTPATIENT)
Dept: OUTPATIENT SERVICES | Facility: HOSPITAL | Age: 55
LOS: 1 days | End: 2025-07-30

## 2025-07-30 ENCOUNTER — APPOINTMENT (OUTPATIENT)
Dept: MRI IMAGING | Facility: CLINIC | Age: 55
End: 2025-07-30
Payer: MEDICAID

## 2025-07-30 VITALS
RESPIRATION RATE: 18 BRPM | HEIGHT: 65 IN | OXYGEN SATURATION: 98 % | HEART RATE: 90 BPM | SYSTOLIC BLOOD PRESSURE: 124 MMHG | DIASTOLIC BLOOD PRESSURE: 99 MMHG | TEMPERATURE: 98 F

## 2025-07-30 DIAGNOSIS — Z88.2 ALLERGY STATUS TO SULFONAMIDES: ICD-10-CM

## 2025-07-30 DIAGNOSIS — Q21.1 ATRIAL SEPTAL DEFECT: Chronic | ICD-10-CM

## 2025-07-30 DIAGNOSIS — R00.2 PALPITATIONS: ICD-10-CM

## 2025-07-30 LAB
ALBUMIN SERPL ELPH-MCNC: 4.2 G/DL — SIGNIFICANT CHANGE UP (ref 3.4–5)
ALP SERPL-CCNC: 71 U/L — SIGNIFICANT CHANGE UP (ref 40–120)
ALT FLD-CCNC: 20 U/L — SIGNIFICANT CHANGE UP (ref 12–42)
ANION GAP SERPL CALC-SCNC: 1 MMOL/L — LOW (ref 9–16)
AST SERPL-CCNC: 23 U/L — SIGNIFICANT CHANGE UP (ref 15–37)
BASOPHILS # BLD AUTO: 0.06 K/UL — SIGNIFICANT CHANGE UP (ref 0–0.2)
BASOPHILS NFR BLD AUTO: 0.9 % — SIGNIFICANT CHANGE UP (ref 0–2)
BILIRUB SERPL-MCNC: 0.6 MG/DL — SIGNIFICANT CHANGE UP (ref 0.2–1.2)
BUN SERPL-MCNC: 15 MG/DL — SIGNIFICANT CHANGE UP (ref 7–23)
CALCIUM SERPL-MCNC: 9.7 MG/DL — SIGNIFICANT CHANGE UP (ref 8.5–10.5)
CHLORIDE SERPL-SCNC: 104 MMOL/L — SIGNIFICANT CHANGE UP (ref 96–108)
CO2 SERPL-SCNC: 32 MMOL/L — HIGH (ref 22–31)
CREAT SERPL-MCNC: 0.96 MG/DL — SIGNIFICANT CHANGE UP (ref 0.5–1.3)
D DIMER BLD IA.RAPID-MCNC: <187 NG/ML DDU — SIGNIFICANT CHANGE UP
EGFR: 70 ML/MIN/1.73M2 — SIGNIFICANT CHANGE UP
EGFR: 70 ML/MIN/1.73M2 — SIGNIFICANT CHANGE UP
EOSINOPHIL # BLD AUTO: 0.1 K/UL — SIGNIFICANT CHANGE UP (ref 0–0.5)
EOSINOPHIL NFR BLD AUTO: 1.4 % — SIGNIFICANT CHANGE UP (ref 0–6)
GLUCOSE SERPL-MCNC: 92 MG/DL — SIGNIFICANT CHANGE UP (ref 70–99)
HCT VFR BLD CALC: 42.4 % — SIGNIFICANT CHANGE UP (ref 34.5–45)
HGB BLD-MCNC: 14.1 G/DL — SIGNIFICANT CHANGE UP (ref 11.5–15.5)
IMM GRANULOCYTES # BLD AUTO: 0.02 K/UL — SIGNIFICANT CHANGE UP (ref 0–0.07)
IMM GRANULOCYTES NFR BLD AUTO: 0.3 % — SIGNIFICANT CHANGE UP (ref 0–0.9)
LYMPHOCYTES # BLD AUTO: 1.89 K/UL — SIGNIFICANT CHANGE UP (ref 1–3.3)
LYMPHOCYTES NFR BLD AUTO: 27 % — SIGNIFICANT CHANGE UP (ref 13–44)
MCHC RBC-ENTMCNC: 29.4 PG — SIGNIFICANT CHANGE UP (ref 27–34)
MCHC RBC-ENTMCNC: 33.3 G/DL — SIGNIFICANT CHANGE UP (ref 32–36)
MCV RBC AUTO: 88.5 FL — SIGNIFICANT CHANGE UP (ref 80–100)
MONOCYTES # BLD AUTO: 0.38 K/UL — SIGNIFICANT CHANGE UP (ref 0–0.9)
MONOCYTES NFR BLD AUTO: 5.4 % — SIGNIFICANT CHANGE UP (ref 2–14)
NEUTROPHILS # BLD AUTO: 4.54 K/UL — SIGNIFICANT CHANGE UP (ref 1.8–7.4)
NEUTROPHILS NFR BLD AUTO: 65 % — SIGNIFICANT CHANGE UP (ref 43–77)
NRBC # BLD AUTO: 0 K/UL — SIGNIFICANT CHANGE UP (ref 0–0)
NRBC # FLD: 0 K/UL — SIGNIFICANT CHANGE UP (ref 0–0)
NRBC BLD AUTO-RTO: 0 /100 WBCS — SIGNIFICANT CHANGE UP (ref 0–0)
NT-PROBNP SERPL-SCNC: 14 PG/ML — SIGNIFICANT CHANGE UP
PLATELET # BLD AUTO: 271 K/UL — SIGNIFICANT CHANGE UP (ref 150–400)
PMV BLD: 9.4 FL — SIGNIFICANT CHANGE UP (ref 7–13)
POTASSIUM SERPL-MCNC: 5.2 MMOL/L — SIGNIFICANT CHANGE UP (ref 3.5–5.3)
POTASSIUM SERPL-SCNC: 5.2 MMOL/L — SIGNIFICANT CHANGE UP (ref 3.5–5.3)
PROT SERPL-MCNC: 7.5 G/DL — SIGNIFICANT CHANGE UP (ref 6.4–8.2)
RBC # BLD: 4.79 M/UL — SIGNIFICANT CHANGE UP (ref 3.8–5.2)
RBC # FLD: 13.2 % — SIGNIFICANT CHANGE UP (ref 10.3–14.5)
SODIUM SERPL-SCNC: 137 MMOL/L — SIGNIFICANT CHANGE UP (ref 132–145)
TROPONIN I, HIGH SENSITIVITY RESULT: <4 NG/L — SIGNIFICANT CHANGE UP
WBC # BLD: 6.99 K/UL — SIGNIFICANT CHANGE UP (ref 3.8–10.5)
WBC # FLD AUTO: 6.99 K/UL — SIGNIFICANT CHANGE UP (ref 3.8–10.5)

## 2025-07-30 PROCEDURE — 71046 X-RAY EXAM CHEST 2 VIEWS: CPT | Mod: 26

## 2025-07-30 PROCEDURE — 70551 MRI BRAIN STEM W/O DYE: CPT | Mod: 26

## 2025-07-30 PROCEDURE — 99285 EMERGENCY DEPT VISIT HI MDM: CPT

## 2025-07-31 ENCOUNTER — TRANSCRIPTION ENCOUNTER (OUTPATIENT)
Age: 55
End: 2025-07-31

## 2025-07-31 DIAGNOSIS — R93.0 ABNORMAL FINDINGS ON DIAGNOSTIC IMAGING OF SKULL AND HEAD, NOT ELSEWHERE CLASSIFIED: ICD-10-CM

## 2025-08-01 ENCOUNTER — TRANSCRIPTION ENCOUNTER (OUTPATIENT)
Age: 55
End: 2025-08-01

## 2025-08-01 RX ORDER — ALPRAZOLAM 0.25 MG/1
0.25 TABLET ORAL
Qty: 4 | Refills: 0 | Status: ACTIVE | COMMUNITY
Start: 2025-08-01 | End: 1900-01-01

## 2025-08-04 ENCOUNTER — APPOINTMENT (OUTPATIENT)
Dept: HEART AND VASCULAR | Facility: CLINIC | Age: 55
End: 2025-08-04
Payer: MEDICAID

## 2025-08-04 ENCOUNTER — RESULT REVIEW (OUTPATIENT)
Age: 55
End: 2025-08-04

## 2025-08-04 ENCOUNTER — TRANSCRIPTION ENCOUNTER (OUTPATIENT)
Age: 55
End: 2025-08-04

## 2025-08-04 VITALS — DIASTOLIC BLOOD PRESSURE: 84 MMHG | HEART RATE: 71 BPM | SYSTOLIC BLOOD PRESSURE: 119 MMHG | OXYGEN SATURATION: 99 %

## 2025-08-04 VITALS — TEMPERATURE: 97.6 F | BODY MASS INDEX: 22.82 KG/M2 | HEIGHT: 65 IN | WEIGHT: 137 LBS

## 2025-08-04 PROCEDURE — 99213 OFFICE O/P EST LOW 20 MIN: CPT | Mod: 25

## 2025-08-04 PROCEDURE — 93000 ELECTROCARDIOGRAM COMPLETE: CPT

## 2025-08-13 ENCOUNTER — OUTPATIENT (OUTPATIENT)
Dept: OUTPATIENT SERVICES | Facility: HOSPITAL | Age: 55
LOS: 1 days | End: 2025-08-13

## 2025-08-13 ENCOUNTER — APPOINTMENT (OUTPATIENT)
Dept: MRI IMAGING | Facility: CLINIC | Age: 55
End: 2025-08-13
Payer: MEDICAID

## 2025-08-13 PROCEDURE — 72157 MRI CHEST SPINE W/O & W/DYE: CPT | Mod: 26

## 2025-08-13 PROCEDURE — 70553 MRI BRAIN STEM W/O & W/DYE: CPT | Mod: 26

## 2025-08-13 PROCEDURE — 72156 MRI NECK SPINE W/O & W/DYE: CPT | Mod: 26

## 2025-08-14 ENCOUNTER — TRANSCRIPTION ENCOUNTER (OUTPATIENT)
Age: 55
End: 2025-08-14

## 2025-08-14 ENCOUNTER — APPOINTMENT (OUTPATIENT)
Dept: HEART AND VASCULAR | Facility: CLINIC | Age: 55
End: 2025-08-14
Payer: MEDICAID

## 2025-08-14 DIAGNOSIS — R06.02 SHORTNESS OF BREATH: ICD-10-CM

## 2025-08-14 PROCEDURE — 99214 OFFICE O/P EST MOD 30 MIN: CPT | Mod: 95

## 2025-08-15 ENCOUNTER — TRANSCRIPTION ENCOUNTER (OUTPATIENT)
Age: 55
End: 2025-08-15

## 2025-08-20 ENCOUNTER — EMERGENCY (EMERGENCY)
Facility: HOSPITAL | Age: 55
LOS: 1 days | End: 2025-08-20
Attending: STUDENT IN AN ORGANIZED HEALTH CARE EDUCATION/TRAINING PROGRAM | Admitting: EMERGENCY MEDICINE
Payer: COMMERCIAL

## 2025-08-20 VITALS
HEART RATE: 99 BPM | HEIGHT: 65 IN | TEMPERATURE: 99 F | OXYGEN SATURATION: 97 % | SYSTOLIC BLOOD PRESSURE: 125 MMHG | DIASTOLIC BLOOD PRESSURE: 85 MMHG | RESPIRATION RATE: 20 BRPM

## 2025-08-20 VITALS
SYSTOLIC BLOOD PRESSURE: 128 MMHG | DIASTOLIC BLOOD PRESSURE: 81 MMHG | RESPIRATION RATE: 20 BRPM | TEMPERATURE: 99 F | OXYGEN SATURATION: 97 % | HEART RATE: 97 BPM

## 2025-08-20 DIAGNOSIS — Q21.1 ATRIAL SEPTAL DEFECT: Chronic | ICD-10-CM

## 2025-08-20 LAB
ANION GAP SERPL CALC-SCNC: 10 MMOL/L — SIGNIFICANT CHANGE UP (ref 5–17)
BASE EXCESS BLDV CALC-SCNC: 4.8 MMOL/L — HIGH (ref -2–3)
BASOPHILS # BLD AUTO: 0.06 K/UL — SIGNIFICANT CHANGE UP (ref 0–0.2)
BASOPHILS NFR BLD AUTO: 1 % — SIGNIFICANT CHANGE UP (ref 0–2)
BUN SERPL-MCNC: 16 MG/DL — SIGNIFICANT CHANGE UP (ref 7–23)
CALCIUM SERPL-MCNC: 9.3 MG/DL — SIGNIFICANT CHANGE UP (ref 8.4–10.5)
CHLORIDE SERPL-SCNC: 103 MMOL/L — SIGNIFICANT CHANGE UP (ref 96–108)
CO2 BLDV-SCNC: 32 MMOL/L — HIGH (ref 22–26)
CO2 SERPL-SCNC: 27 MMOL/L — SIGNIFICANT CHANGE UP (ref 22–31)
CREAT SERPL-MCNC: 0.92 MG/DL — SIGNIFICANT CHANGE UP (ref 0.5–1.3)
EGFR: 74 ML/MIN/1.73M2 — SIGNIFICANT CHANGE UP
EGFR: 74 ML/MIN/1.73M2 — SIGNIFICANT CHANGE UP
EOSINOPHIL # BLD AUTO: 0.06 K/UL — SIGNIFICANT CHANGE UP (ref 0–0.5)
EOSINOPHIL NFR BLD AUTO: 1 % — SIGNIFICANT CHANGE UP (ref 0–6)
GLUCOSE SERPL-MCNC: 105 MG/DL — HIGH (ref 70–99)
HCO3 BLDV-SCNC: 30 MMOL/L — HIGH (ref 22–29)
HCT VFR BLD CALC: 41.6 % — SIGNIFICANT CHANGE UP (ref 34.5–45)
HGB BLD-MCNC: 13.6 G/DL — SIGNIFICANT CHANGE UP (ref 11.5–15.5)
IMM GRANULOCYTES # BLD AUTO: 0.02 K/UL — SIGNIFICANT CHANGE UP (ref 0–0.07)
IMM GRANULOCYTES NFR BLD AUTO: 0.3 % — SIGNIFICANT CHANGE UP (ref 0–0.9)
LYMPHOCYTES # BLD AUTO: 1.52 K/UL — SIGNIFICANT CHANGE UP (ref 1–3.3)
LYMPHOCYTES NFR BLD AUTO: 25.7 % — SIGNIFICANT CHANGE UP (ref 13–44)
MCHC RBC-ENTMCNC: 29.6 PG — SIGNIFICANT CHANGE UP (ref 27–34)
MCHC RBC-ENTMCNC: 32.7 G/DL — SIGNIFICANT CHANGE UP (ref 32–36)
MCV RBC AUTO: 90.4 FL — SIGNIFICANT CHANGE UP (ref 80–100)
MONOCYTES # BLD AUTO: 0.34 K/UL — SIGNIFICANT CHANGE UP (ref 0–0.9)
MONOCYTES NFR BLD AUTO: 5.8 % — SIGNIFICANT CHANGE UP (ref 2–14)
NEUTROPHILS # BLD AUTO: 3.91 K/UL — SIGNIFICANT CHANGE UP (ref 1.8–7.4)
NEUTROPHILS NFR BLD AUTO: 66.2 % — SIGNIFICANT CHANGE UP (ref 43–77)
NRBC # BLD AUTO: 0 K/UL — SIGNIFICANT CHANGE UP (ref 0–0)
NRBC # FLD: 0 K/UL — SIGNIFICANT CHANGE UP (ref 0–0)
NRBC BLD AUTO-RTO: 0 /100 WBCS — SIGNIFICANT CHANGE UP (ref 0–0)
PCO2 BLDV: 48 MMHG — HIGH (ref 39–42)
PH BLDV: 7.41 — SIGNIFICANT CHANGE UP (ref 7.32–7.43)
PLATELET # BLD AUTO: 257 K/UL — SIGNIFICANT CHANGE UP (ref 150–400)
PMV BLD: 9.2 FL — SIGNIFICANT CHANGE UP (ref 7–13)
PO2 BLDV: <33 MMHG — LOW (ref 25–45)
POTASSIUM SERPL-MCNC: 4.9 MMOL/L — SIGNIFICANT CHANGE UP (ref 3.5–5.3)
POTASSIUM SERPL-SCNC: 4.9 MMOL/L — SIGNIFICANT CHANGE UP (ref 3.5–5.3)
RBC # BLD: 4.6 M/UL — SIGNIFICANT CHANGE UP (ref 3.8–5.2)
RBC # FLD: 13.4 % — SIGNIFICANT CHANGE UP (ref 10.3–14.5)
SAO2 % BLDV: 39.9 % — LOW (ref 67–88)
SODIUM SERPL-SCNC: 140 MMOL/L — SIGNIFICANT CHANGE UP (ref 135–145)
TROPONIN T, HIGH SENSITIVITY RESULT: <6 NG/L — SIGNIFICANT CHANGE UP
WBC # BLD: 5.91 K/UL — SIGNIFICANT CHANGE UP (ref 3.8–10.5)
WBC # FLD AUTO: 5.91 K/UL — SIGNIFICANT CHANGE UP (ref 3.8–10.5)

## 2025-08-20 PROCEDURE — 80048 BASIC METABOLIC PNL TOTAL CA: CPT

## 2025-08-20 PROCEDURE — 36415 COLL VENOUS BLD VENIPUNCTURE: CPT

## 2025-08-20 PROCEDURE — 84484 ASSAY OF TROPONIN QUANT: CPT

## 2025-08-20 PROCEDURE — 82803 BLOOD GASES ANY COMBINATION: CPT

## 2025-08-20 PROCEDURE — 85025 COMPLETE CBC W/AUTO DIFF WBC: CPT

## 2025-08-20 PROCEDURE — 99284 EMERGENCY DEPT VISIT MOD MDM: CPT

## 2025-08-20 PROCEDURE — 99283 EMERGENCY DEPT VISIT LOW MDM: CPT

## 2025-08-21 ENCOUNTER — EMERGENCY (EMERGENCY)
Age: 55
LOS: 1 days | End: 2025-08-21
Attending: EMERGENCY MEDICINE | Admitting: EMERGENCY MEDICINE
Payer: MEDICAID

## 2025-08-21 VITALS
SYSTOLIC BLOOD PRESSURE: 128 MMHG | RESPIRATION RATE: 17 BRPM | TEMPERATURE: 98 F | OXYGEN SATURATION: 98 % | WEIGHT: 136.69 LBS | DIASTOLIC BLOOD PRESSURE: 88 MMHG | HEART RATE: 109 BPM | HEIGHT: 65 IN

## 2025-08-21 PROCEDURE — 99284 EMERGENCY DEPT VISIT MOD MDM: CPT

## 2025-08-21 PROCEDURE — 74018 RADEX ABDOMEN 1 VIEW: CPT | Mod: 26

## 2025-08-21 RX ORDER — LACTULOSE 10 G/15ML
10 SOLUTION ORAL ONCE
Refills: 0 | Status: COMPLETED | OUTPATIENT
Start: 2025-08-21 | End: 2025-08-21

## 2025-08-22 ENCOUNTER — APPOINTMENT (OUTPATIENT)
Dept: PULMONOLOGY | Facility: CLINIC | Age: 55
End: 2025-08-22
Payer: MEDICAID

## 2025-08-22 ENCOUNTER — TRANSCRIPTION ENCOUNTER (OUTPATIENT)
Age: 55
End: 2025-08-22

## 2025-08-22 VITALS
HEART RATE: 74 BPM | HEIGHT: 65 IN | WEIGHT: 131 LBS | OXYGEN SATURATION: 98 % | SYSTOLIC BLOOD PRESSURE: 109 MMHG | DIASTOLIC BLOOD PRESSURE: 76 MMHG | BODY MASS INDEX: 21.83 KG/M2 | TEMPERATURE: 97.7 F | RESPIRATION RATE: 12 BRPM

## 2025-08-22 DIAGNOSIS — R06.02 SHORTNESS OF BREATH: ICD-10-CM

## 2025-08-22 PROCEDURE — G2211 COMPLEX E/M VISIT ADD ON: CPT | Mod: NC

## 2025-08-22 PROCEDURE — 99214 OFFICE O/P EST MOD 30 MIN: CPT

## 2025-08-23 DIAGNOSIS — R06.00 DYSPNEA, UNSPECIFIED: ICD-10-CM

## 2025-08-23 DIAGNOSIS — Z88.2 ALLERGY STATUS TO SULFONAMIDES: ICD-10-CM

## 2025-08-23 DIAGNOSIS — K59.00 CONSTIPATION, UNSPECIFIED: ICD-10-CM

## 2025-08-25 ENCOUNTER — APPOINTMENT (OUTPATIENT)
Dept: INTERNAL MEDICINE | Facility: CLINIC | Age: 55
End: 2025-08-25
Payer: MEDICAID

## 2025-08-25 ENCOUNTER — TRANSCRIPTION ENCOUNTER (OUTPATIENT)
Age: 55
End: 2025-08-25

## 2025-08-25 VITALS
DIASTOLIC BLOOD PRESSURE: 81 MMHG | BODY MASS INDEX: 21.83 KG/M2 | HEIGHT: 65 IN | HEART RATE: 73 BPM | OXYGEN SATURATION: 100 % | SYSTOLIC BLOOD PRESSURE: 116 MMHG | WEIGHT: 131 LBS

## 2025-08-25 DIAGNOSIS — I10 ESSENTIAL (PRIMARY) HYPERTENSION: ICD-10-CM

## 2025-08-25 DIAGNOSIS — F41.9 ANXIETY DISORDER, UNSPECIFIED: ICD-10-CM

## 2025-08-25 PROCEDURE — 99386 PREV VISIT NEW AGE 40-64: CPT | Mod: 25

## 2025-08-25 PROCEDURE — 99204 OFFICE O/P NEW MOD 45 MIN: CPT | Mod: 25

## 2025-08-25 PROCEDURE — G0444 DEPRESSION SCREEN ANNUAL: CPT | Mod: 59

## 2025-08-26 ENCOUNTER — TRANSCRIPTION ENCOUNTER (OUTPATIENT)
Age: 55
End: 2025-08-26

## 2025-08-29 LAB
CHOLEST SERPL-MCNC: 152 MG/DL
ESTIMATED AVERAGE GLUCOSE: 105 MG/DL
HBA1C MFR BLD HPLC: 5.3 %
HDLC SERPL-MCNC: 67 MG/DL
LDLC SERPL-MCNC: 70 MG/DL
NONHDLC SERPL-MCNC: 85 MG/DL
TRIGL SERPL-MCNC: 75 MG/DL

## 2025-09-02 ENCOUNTER — APPOINTMENT (OUTPATIENT)
Dept: HEART AND VASCULAR | Facility: CLINIC | Age: 55
End: 2025-09-02
Payer: MEDICAID

## 2025-09-02 VITALS
WEIGHT: 132 LBS | HEART RATE: 67 BPM | BODY MASS INDEX: 21.99 KG/M2 | OXYGEN SATURATION: 97 % | SYSTOLIC BLOOD PRESSURE: 102 MMHG | DIASTOLIC BLOOD PRESSURE: 77 MMHG | HEIGHT: 65 IN

## 2025-09-02 DIAGNOSIS — Q21.12 PATENT FORAMEN OVALE: ICD-10-CM

## 2025-09-02 PROCEDURE — 93306 TTE W/DOPPLER COMPLETE: CPT

## 2025-09-05 ENCOUNTER — LABORATORY RESULT (OUTPATIENT)
Age: 55
End: 2025-09-05

## 2025-09-05 ENCOUNTER — NON-APPOINTMENT (OUTPATIENT)
Age: 55
End: 2025-09-05

## 2025-09-05 ENCOUNTER — APPOINTMENT (OUTPATIENT)
Dept: NEUROLOGY | Facility: CLINIC | Age: 55
End: 2025-09-05
Payer: MEDICAID

## 2025-09-05 VITALS
BODY MASS INDEX: 21.99 KG/M2 | WEIGHT: 132 LBS | SYSTOLIC BLOOD PRESSURE: 114 MMHG | HEART RATE: 71 BPM | OXYGEN SATURATION: 98 % | DIASTOLIC BLOOD PRESSURE: 81 MMHG | HEIGHT: 65 IN

## 2025-09-05 DIAGNOSIS — R93.0 ABNORMAL FINDINGS ON DIAGNOSTIC IMAGING OF SKULL AND HEAD, NOT ELSEWHERE CLASSIFIED: ICD-10-CM

## 2025-09-05 DIAGNOSIS — G35 MULTIPLE SCLEROSIS: ICD-10-CM

## 2025-09-05 DIAGNOSIS — I63.9 CEREBRAL INFARCTION, UNSPECIFIED: ICD-10-CM

## 2025-09-05 DIAGNOSIS — R90.89 OTHER ABNORMAL FINDINGS ON DIAGNOSTIC IMAGING OF CENTRAL NERVOUS SYSTEM: ICD-10-CM

## 2025-09-05 LAB
APTT BLD: 31.3 SEC
INR PPP: 0.97 RATIO
LUPUS ANTICOAGULANT CASCADE REFLEX: NORMAL
PT BLD: 11.2 SEC

## 2025-09-05 PROCEDURE — 99214 OFFICE O/P EST MOD 30 MIN: CPT

## 2025-09-08 LAB
25(OH)D3 SERPL-MCNC: 65.3 NG/ML
B BURGDOR AB SER-IMP: NEGATIVE
B BURGDOR IGG+IGM SER QL: 0.07 INDEX
CALCIUM SERPL-MCNC: 9.6 MG/DL
CCP AB SER IA-ACNC: <8 U/ML
CCP AB SER QL: NEGATIVE
CRP SERPL-MCNC: <3 MG/L
DSDNA AB SER-ACNC: <1 IU/ML
ENA RNP AB SER-ACNC: 0.6 AL
ENA SCL70 AB SER-ACNC: <0.2 AL
ENA SM AB SER-ACNC: <0.2 AL
ENA SS-A AB SER-ACNC: <0.2 AL
ENA SS-B AB SER-ACNC: <0.2 AL
ERYTHROCYTE [SEDIMENTATION RATE] IN BLOOD BY WESTERGREN METHOD: 2 MM/HR
FOLATE SERPL-MCNC: >20 NG/ML
HCYS SERPL-MCNC: 12.3 UMOL/L
HIV1+2 AB SPEC QL IA.RAPID: NONREACTIVE
IGG4 SER-MCNC: 32.9 MG/DL
PARATHYROID HORMONE INTACT: 47 PG/ML
RHEUMATOID FACT SERPL-ACNC: <10 IU/ML
T PALLIDUM AB SER QL IA: NEGATIVE
THYROGLOB AB SERPL-ACNC: 21.8 IU/ML
THYROPEROXIDASE AB SERPL IA-ACNC: 12.4 IU/ML
TSH SERPL-ACNC: 0.91 UIU/ML
VIT B12 SERPL-MCNC: 586 PG/ML

## 2025-09-09 ENCOUNTER — APPOINTMENT (OUTPATIENT)
Dept: PULMONOLOGY | Facility: CLINIC | Age: 55
End: 2025-09-09
Payer: MEDICAID

## 2025-09-09 VITALS
BODY MASS INDEX: 21.99 KG/M2 | WEIGHT: 132 LBS | TEMPERATURE: 97.3 F | OXYGEN SATURATION: 99 % | HEART RATE: 69 BPM | DIASTOLIC BLOOD PRESSURE: 84 MMHG | SYSTOLIC BLOOD PRESSURE: 120 MMHG | HEIGHT: 65 IN

## 2025-09-09 DIAGNOSIS — R05.9 COUGH, UNSPECIFIED: ICD-10-CM

## 2025-09-09 DIAGNOSIS — R06.02 SHORTNESS OF BREATH: ICD-10-CM

## 2025-09-09 PROCEDURE — 94618 PULMONARY STRESS TESTING: CPT

## 2025-09-09 PROCEDURE — 94729 DIFFUSING CAPACITY: CPT

## 2025-09-09 PROCEDURE — 94726 PLETHYSMOGRAPHY LUNG VOLUMES: CPT

## 2025-09-09 PROCEDURE — 99214 OFFICE O/P EST MOD 30 MIN: CPT | Mod: 25

## 2025-09-09 PROCEDURE — 94010 BREATHING CAPACITY TEST: CPT

## 2025-09-10 PROBLEM — R05.9 COUGH: Status: ACTIVE | Noted: 2025-09-09

## 2025-09-10 LAB
ACE BLD-CCNC: 41 U/L
ANA TITR SER: NEGATIVE
ANCA AB SER-IMP: ABNORMAL
C-ANCA SER-ACNC: NEGATIVE
COPPER SERPL-MCNC: 90 UG/DL
HTLV I+II AB SER QL: NEGATIVE
P-ANCA TITR SER IF: NEGATIVE
ZINC SERPL-MCNC: 70 UG/DL

## 2025-09-11 LAB — METHYLMALONATE SERPL-SCNC: 286 NMOL/L

## 2025-09-16 ENCOUNTER — APPOINTMENT (OUTPATIENT)
Dept: DERMATOLOGY | Facility: CLINIC | Age: 55
End: 2025-09-16
Payer: MEDICAID

## 2025-09-16 DIAGNOSIS — L65.9 NONSCARRING HAIR LOSS, UNSPECIFIED: ICD-10-CM

## 2025-09-16 PROCEDURE — 11104 PUNCH BX SKIN SINGLE LESION: CPT

## 2025-09-18 ENCOUNTER — APPOINTMENT (OUTPATIENT)
Dept: HEART AND VASCULAR | Facility: CLINIC | Age: 55
End: 2025-09-18
Payer: MEDICAID

## 2025-09-18 PROCEDURE — 99214 OFFICE O/P EST MOD 30 MIN: CPT | Mod: 95

## 2025-09-18 RX ORDER — ROSUVASTATIN CALCIUM 5 MG/1
5 TABLET, FILM COATED ORAL
Qty: 90 | Refills: 3 | Status: ACTIVE | COMMUNITY
Start: 2025-09-18 | End: 1900-01-01

## 2025-09-20 ENCOUNTER — APPOINTMENT (OUTPATIENT)
Dept: OTOLARYNGOLOGY | Facility: CLINIC | Age: 55
End: 2025-09-20
Payer: MEDICAID

## 2025-09-20 VITALS
HEIGHT: 65 IN | TEMPERATURE: 98.2 F | HEART RATE: 79 BPM | DIASTOLIC BLOOD PRESSURE: 71 MMHG | BODY MASS INDEX: 22.82 KG/M2 | WEIGHT: 137 LBS | SYSTOLIC BLOOD PRESSURE: 103 MMHG | OXYGEN SATURATION: 98 %

## 2025-09-20 DIAGNOSIS — K21.9 GASTRO-ESOPHAGEAL REFLUX DISEASE W/OUT ESOPHAGITIS: ICD-10-CM

## 2025-09-20 DIAGNOSIS — H61.22 IMPACTED CERUMEN, LEFT EAR: ICD-10-CM

## 2025-09-20 DIAGNOSIS — Z83.3 FAMILY HISTORY OF DIABETES MELLITUS: ICD-10-CM

## 2025-09-20 DIAGNOSIS — Z82.49 FAMILY HISTORY OF ISCHEMIC HEART DISEASE AND OTHER DISEASES OF THE CIRCULATORY SYSTEM: ICD-10-CM

## 2025-09-20 DIAGNOSIS — H93.13 TINNITUS, BILATERAL: ICD-10-CM

## 2025-09-20 PROCEDURE — 99204 OFFICE O/P NEW MOD 45 MIN: CPT | Mod: 25

## (undated) DEVICE — ELCTR ZOLL DEFIBRILLATOR PAD NO REPLACEMENT

## (undated) DEVICE — Device

## (undated) DEVICE — DRAPE ULTRASOUND PROBE COVER CATH FAMILY CONNECTOR

## (undated) DEVICE — DRAPE ULTRASOUND TRANSDUCER COVER

## (undated) DEVICE — NDL TRANSSEPTAL CRV 18GX71CM

## (undated) DEVICE — SUT VICRYL 2-0 27" CT-1

## (undated) DEVICE — DRSG QUICKCLOT HEMOSTATIC 4X4 FOIL

## (undated) DEVICE — NDL TRANSEPTAL BRK-1 98CM

## (undated) DEVICE — CUFF FINGER CLEARSIGHT LG

## (undated) DEVICE — VENODYNE/SCD SLEEVE CALF MEDIUM

## (undated) DEVICE — WARMING BLANKET LOWER ADULT